# Patient Record
Sex: FEMALE | Race: WHITE | Employment: FULL TIME | URBAN - METROPOLITAN AREA
[De-identification: names, ages, dates, MRNs, and addresses within clinical notes are randomized per-mention and may not be internally consistent; named-entity substitution may affect disease eponyms.]

---

## 2017-03-21 ENCOUNTER — TELEPHONE (OUTPATIENT)
Dept: FAMILY MEDICINE | Facility: CLINIC | Age: 47
End: 2017-03-21

## 2017-03-21 NOTE — LETTER
[facility]  [address], [address]  [place] [address]  Phone: [phone]  Fax: [phone]  March 24, 2017      Kami Bjorgan  [address] [place] [address]      Dear Kami,    We care about your health and have reviewed your health plan including your medical conditions, medications, and lab results.  Based on this review, it is recommended that you follow up regarding the following health topic(s):  -Asthma  -Cervical Cancer Screening    We recommend you take the following action(s):  -schedule a PAP SMEAR EXAM which is due.  Please disregard this reminder if you have had this exam elsewhere within the last year.  It would be helpful for us to have a copy of your recent pap smear report to update your records.   -Complete and return the attached ASTHMA CONTROL TEST.  If your total score is 19 or less or you have been to the ER or urgent care for your asthma, then please schedule an asthma followup appointment.     Please call us at the [facility] - [phone] (or use [employer]) to address the above recommendations.     Thank you for trusting [facility] and we appreciate the opportunity to serve you.  We look forward to supporting your healthcare needs in the future.    Healthy Regards,    Your Health Care Team  [facility]   I will STOP taking the medications listed below when I get home from the hospital:  None

## 2017-03-21 NOTE — TELEPHONE ENCOUNTER
Summary:    Patient is due/failing the following:   ACT, PAP and PHQ9    Action needed:   Patient needs office visit for PAP., Patient needs to do ACT. and Patient needs to do PHQ9.    Type of outreach:    Phone, left message for patient to call back.     Questions for provider review:    None                                                                                                                                    Sary Sy       Chart routed to Care Team .        Panel Management Review      Patient has the following on her problem list:     Depression / Dysthymia review  PHQ-9 SCORE 10/15/2015 4/29/2016 10/20/2016   Total Score - - -   Total Score 0 0 0      Patient is due for:  PHQ9    Asthma review     ACT Total Scores 10/20/2016   ACT TOTAL SCORE -   ASTHMA ER VISITS -   ASTHMA HOSPITALIZATIONS -   ACT TOTAL SCORE (Goal Greater than or Equal to 20) 20   In the past 12 months, how many times did you visit the emergency room for your asthma without being admitted to the hospital? 0   In the past 12 months, how many times were you hospitalized overnight because of your asthma? 0      1. Is Asthma diagnosis on the Problem List? Yes    2. Is Asthma listed on Health Maintenance? Yes    3. Patient is due for:  ACT      Composite cancer screening  Chart review shows that this patient is due/due soon for the following Pap Smear

## 2017-04-14 ENCOUNTER — OFFICE VISIT (OUTPATIENT)
Dept: FAMILY MEDICINE | Facility: CLINIC | Age: 47
End: 2017-04-14
Payer: COMMERCIAL

## 2017-04-14 VITALS
TEMPERATURE: 98.1 F | DIASTOLIC BLOOD PRESSURE: 80 MMHG | SYSTOLIC BLOOD PRESSURE: 110 MMHG | BODY MASS INDEX: 38.2 KG/M2 | WEIGHT: 237.7 LBS | HEART RATE: 60 BPM | HEIGHT: 66 IN | RESPIRATION RATE: 8 BRPM

## 2017-04-14 DIAGNOSIS — Z00.00 ROUTINE GENERAL MEDICAL EXAMINATION AT A HEALTH CARE FACILITY: ICD-10-CM

## 2017-04-14 DIAGNOSIS — Z00.01 ENCOUNTER FOR ROUTINE ADULT MEDICAL EXAM WITH ABNORMAL FINDINGS: ICD-10-CM

## 2017-04-14 DIAGNOSIS — N92.0 MENORRHAGIA WITH REGULAR CYCLE: Primary | ICD-10-CM

## 2017-04-14 LAB
ALT SERPL W P-5'-P-CCNC: 21 U/L (ref 0–50)
ANION GAP SERPL CALCULATED.3IONS-SCNC: 11 MMOL/L (ref 3–14)
AST SERPL W P-5'-P-CCNC: 20 U/L (ref 0–45)
BUN SERPL-MCNC: 11 MG/DL (ref 7–30)
CALCIUM SERPL-MCNC: 9.1 MG/DL (ref 8.5–10.1)
CHLORIDE SERPL-SCNC: 103 MMOL/L (ref 94–109)
CHOLEST SERPL-MCNC: 169 MG/DL
CO2 SERPL-SCNC: 27 MMOL/L (ref 20–32)
CREAT SERPL-MCNC: 0.94 MG/DL (ref 0.52–1.04)
ERYTHROCYTE [DISTWIDTH] IN BLOOD BY AUTOMATED COUNT: 12.6 % (ref 10–15)
FERRITIN SERPL-MCNC: 15 NG/ML (ref 8–252)
GFR SERPL CREATININE-BSD FRML MDRD: 63 ML/MIN/1.7M2
GLUCOSE SERPL-MCNC: 94 MG/DL (ref 70–99)
HCT VFR BLD AUTO: 36.5 % (ref 35–47)
HDLC SERPL-MCNC: 51 MG/DL
HGB BLD-MCNC: 12.6 G/DL (ref 11.7–15.7)
IRON SATN MFR SERPL: 22 % (ref 15–46)
IRON SERPL-MCNC: 85 UG/DL (ref 35–180)
LDLC SERPL CALC-MCNC: 93 MG/DL
MCH RBC QN AUTO: 30.1 PG (ref 26.5–33)
MCHC RBC AUTO-ENTMCNC: 34.5 G/DL (ref 31.5–36.5)
MCV RBC AUTO: 87 FL (ref 78–100)
NONHDLC SERPL-MCNC: 118 MG/DL
PLATELET # BLD AUTO: 302 10E9/L (ref 150–450)
POTASSIUM SERPL-SCNC: 4.1 MMOL/L (ref 3.4–5.3)
RBC # BLD AUTO: 4.18 10E12/L (ref 3.8–5.2)
SODIUM SERPL-SCNC: 141 MMOL/L (ref 133–144)
TIBC SERPL-MCNC: 395 UG/DL (ref 240–430)
TRIGL SERPL-MCNC: 127 MG/DL
WBC # BLD AUTO: 8.2 10E9/L (ref 4–11)

## 2017-04-14 PROCEDURE — 84450 TRANSFERASE (AST) (SGOT): CPT | Performed by: PHYSICIAN ASSISTANT

## 2017-04-14 PROCEDURE — G0145 SCR C/V CYTO,THINLAYER,RESCR: HCPCS | Performed by: PHYSICIAN ASSISTANT

## 2017-04-14 PROCEDURE — 80061 LIPID PANEL: CPT | Performed by: PHYSICIAN ASSISTANT

## 2017-04-14 PROCEDURE — 99396 PREV VISIT EST AGE 40-64: CPT | Performed by: PHYSICIAN ASSISTANT

## 2017-04-14 PROCEDURE — 80048 BASIC METABOLIC PNL TOTAL CA: CPT | Performed by: PHYSICIAN ASSISTANT

## 2017-04-14 PROCEDURE — 84460 ALANINE AMINO (ALT) (SGPT): CPT | Performed by: PHYSICIAN ASSISTANT

## 2017-04-14 PROCEDURE — 36415 COLL VENOUS BLD VENIPUNCTURE: CPT | Performed by: PHYSICIAN ASSISTANT

## 2017-04-14 PROCEDURE — 83540 ASSAY OF IRON: CPT | Performed by: PHYSICIAN ASSISTANT

## 2017-04-14 PROCEDURE — 83550 IRON BINDING TEST: CPT | Performed by: PHYSICIAN ASSISTANT

## 2017-04-14 PROCEDURE — 87624 HPV HI-RISK TYP POOLED RSLT: CPT | Performed by: PHYSICIAN ASSISTANT

## 2017-04-14 PROCEDURE — 82728 ASSAY OF FERRITIN: CPT | Performed by: PHYSICIAN ASSISTANT

## 2017-04-14 PROCEDURE — 85027 COMPLETE CBC AUTOMATED: CPT | Performed by: PHYSICIAN ASSISTANT

## 2017-04-14 ASSESSMENT — PAIN SCALES - GENERAL: PAINLEVEL: NO PAIN (0)

## 2017-04-14 NOTE — LETTER
"[facility]  [address]  [place] [id number] [phone]             April 18, 2017    Kami Natarajan  [address]  [place] [address]              Dear Kami,    The results of your recent tests were normal.  Enclosed is a copy of the results.  It was a pleasure to see you at your last appointment.    The cholesterol looks great! Keep up good efforts at healthy eating choices. The blood sugar was normal. Kidney and liver tests were normal. Even with the heavy periods, the hemoglobin (measures red blood cells and blood loss) was normal and the iron was normal. Plan to see the gynecologist like we discussed.     Interpreting your Cholesterol Profile Results: The LDL is the \"bad\" cholesterol that can clog the arteries.  The HDL is protective or \"good cholesterol\"; exercise and weight management can boost this level. Triglycerides are the sugary-fats in the blood. The ratio of HDL to total cholesterol under 5.0 is optimal.        Results for orders placed or performed in visit on 04/14/17   Pap imaged thin layer screen with HPV - recommended age 30 - 65   Result Value Ref Range    PAP NIL     Copath Report         Patient Name: KAMI NATARAJAN  MR#: [id number]  Specimen #: [id number]  Collected: 4/14/2017  Received: 4/17/2017  Reported: 4/18/2017 15:40  Ordering Phy(s): LA NENA BARBOSA    For improved result formatting, select 'View Enhanced Report Format'  under Linked Documents section.    SPECIMEN/STAIN PROCESS:  Pap imaged thin layer prep screening (Surepath, FocalPoint with guided  screening)       Pap-Cyto x 1, HPV ordered x 1    SOURCE: Cervical, endocervical  ----------------------------------------------------------------   Pap imaged thin layer prep screening (Surepath, FocalPoint with guided  screening)  SPECIMEN ADEQUACY:  Satisfactory for evaluation.  -Transformation zone component present.    CYTOLOGIC INTERPRETATION:    Negative for Intraepithelial Lesion or " Malignancy    Electronically signed out by:  GÉNESIS Javier ( ASCP)    Processed and screened at [facility]    CLINICAL HISTORY:    Irregular periods, Previous n ormal pap  Date of Last Pap: 2/14/14,    Papanicolaou Test Limitations:  Cervical cytology is a screening test  with limited sensitivity; regular screening is critical for cancer  prevention; Pap tests are primarily effective for the  diagnosis/prevention of squamous cell carcinoma, not adenocarcinomas or  other cancers.    TESTING LAB LOCATION:  [address]  [phone]    COLLECTION SITE:  Client:  [facility]  Location: [facility] (B)     Basic metabolic panel  (Ca, Cl, CO2, Creat, Gluc, K, Na, BUN)   Result Value Ref Range    Sodium 141 133 - 144 mmol/L    Potassium 4.1 3.4 - 5.3 mmol/L    Chloride 103 94 - 109 mmol/L    Carbon Dioxide 27 20 - 32 mmol/L    Anion Gap 11 3 - 14 mmol/L    Glucose 94 70 - 99 mg/dL    Urea Nitrogen 11 7 - 30 mg/dL    Creatinine 0.94 0.52 - 1.04 mg/dL    GFR Estimate 63 >60 mL/min/1.7m2    GFR Estimate If Black 76 >60 mL/min/1.7m2    Calcium 9.1 8.5 - 10.1 mg/dL   ALT   Result Value Ref Range    ALT 21 0 - 50 U/L   AST   Result Value Ref Range    AST 20 0 - 45 U/L   Lipid panel reflex to direct LDL   Result Value Ref Range    Cholesterol 169 <200 mg/dL    Triglycerides 127 <150 mg/dL    HDL Cholesterol 51 >49 mg/dL    LDL Cholesterol Calculated 93 <100 mg/dL    Non HDL Cholesterol 118 <130 mg/dL   CBC with platelets   Result Value Ref Range    WBC 8.2 4.0 - 11.0 10e9/L    RBC Count 4.18 3.8 - 5.2 10e12/L    Hemoglobin 12.6 11.7 - 15.7 g/dL    Hematocrit 36.5 35.0 - 47.0 %    MCV 87 78 - 100 fl    MCH 30.1 26.5 - 33.0 pg    MCHC 34.5 31.5 - 36.5 g/dL    RDW 12.6 10.0 - 15.0 %    Platelet Count 302 150 - 450 10e9/L   Iron and iron binding capacity   Result Value Ref  Range    Iron 85 35 - 180 ug/dL    Iron Binding Cap 395 240 - 430 ug/dL    Iron Saturation Index 22 15 - 46 %   Ferritin   Result Value Ref Range    Ferritin 15 8 - 252 ng/mL         If you have any questions or concerns, please call myself or my nurse at [phone].      Sincerely,      Frances Reynolds PA-C

## 2017-04-14 NOTE — MR AVS SNAPSHOT
After Visit Summary   4/14/2017    Kami Natarajan    MRN: [id number]           Patient Information     Date Of Birth          1970        Visit Information        Provider Department      4/14/2017 9:40 AM Frances Reynolds PA-C [facility]        Today's Diagnoses     Menorrhagia with regular cycle    -  1    Routine general medical examination at a health care facility        Encounter for routine adult medical exam with abnormal findings          Care Instructions      Preventive Health Recommendations  Female Ages 40 to 49    Yearly exam:     See your health care provider every year in order to  1. Review health changes.   2. Discuss preventive care.    3. Review your medicines if your doctor prescribed any.      Get a Pap test every three years (unless you have an abnormal result and your provider advises testing more often).      If you get Pap tests with HPV test, you only need to test every 5 years, unless you have an abnormal result. You do not need a Pap test if your uterus was removed (hysterectomy) and you have not had cancer.      You should be tested each year for STDs (sexually transmitted diseases), if you're at risk.       Ask your doctor if you should have a mammogram.      Have a colonoscopy (test for colon cancer) if someone in your family has had colon cancer or polyps before age 50.       Have a cholesterol test every 5 years.       Have a diabetes test (fasting glucose) after age 45. If you are at risk for diabetes, you should have this test every 3 years.    Shots: Get a flu shot each year. Get a tetanus shot every 10 years.     Nutrition:     Eat at least 5 servings of fruits and vegetables each day.    Eat whole-grain bread, whole-wheat pasta and brown rice instead of white grains and rice.    Talk to your provider about Calcium and Vitamin D.     Lifestyle    Exercise at least 150 minutes a week (an average of 30 minutes a day, 5 days a week). This will help  you control your weight and prevent disease.    Limit alcohol to one drink per day.    No smoking.     Wear sunscreen to prevent skin cancer.    See your dentist every six months for an exam and cleaning.      We talked about the heavy bleeding (menorrhagia).   discussed possble need for repeat endometrial biopsy, pelvic ultrasound  Checking iron levels today  Discussed mirena IUD and endometrial ablation for treatment  Plan to see - OB/GYN          Follow-ups after your visit        Your next 10 appointments already scheduled     Oct 16, 2017  4:50 PM CDT   LAB with LAB FIRST FLOOR [facility])    [address] [place] [address] [phone]           Patient must bring picture ID.  Patient should be prepared to give a urine specimen  Please do not eat 10-12 hours before your appointment if you are coming in fasting for labs on lipids, cholesterol, or glucose (sugar).  Pregnant women should follow their Care Team instructions. Water with medications is okay. Do not drink coffee or other fluids.   If you have concerns about taking  your medications, please ask at office or if scheduling via [employer], send a message by clicking on Secure Messaging, Message Your Care Team.            Oct 23, 2017  8:00 AM CDT   Return Visit with Anju Jacobs MD   [facility])    [address] [place] [address] [phone]              Who to contact     If you have questions or need follow up information about today's clinic visit or your schedule please contact [facility] directly at [phone].  Normal or non-critical lab and imaging results will be communicated to you by MyChart, letter or phone within 4 business days after the clinic has received the results. If you do not hear from us within 7 days, please contact the clinic through MyChart or phone. If you have a  "critical or abnormal lab result, we will notify you by phone as soon as possible.  Submit refill requests through [employer] or call your pharmacy and they will forward the refill request to us. Please allow 3 business days for your refill to be completed.          Additional Information About Your Visit        [employer] Information     [employer] gives you secure access to your electronic health record. If you see a primary care provider, you can also send messages to your care team and make appointments. If you have questions, please call your primary care clinic.  If you do not have a primary care provider, please call [phone] and they will assist you.        Care EveryWhere ID     This is your Care EveryWhere ID. This could be used by other organizations to access your [facility] medical records  [id number]        Your Vitals Were     Pulse Temperature Respirations Height Last Period BMI (Body Mass Index)    60 98.1  F (36.7  C) (Temporal) 8 5' 6.14\" (1.68 m) 03/26/2017 (Approximate) 38.2 kg/m2       Blood Pressure from Last 3 Encounters:   04/14/17 110/80   11/22/16 116/81   10/20/16 120/70    Weight from Last 3 Encounters:   04/14/17 237 lb 11.2 oz (107.8 kg)   11/22/16 232 lb 6.4 oz (105.4 kg)   10/20/16 235 lb 3.2 oz (106.7 kg)              We Performed the Following     ALT     AST     Asthma Action Plan (AAP)     Basic metabolic panel  (Ca, Cl, CO2, Creat, Gluc, K, Na, BUN)     CBC with platelets     DEPRESSION ACTION PLAN (DAP)     Ferritin     HPV High Risk Types DNA Cervical     Iron and iron binding capacity     Lipid panel reflex to direct LDL     Pap imaged thin layer screen with HPV - recommended age 30 - 65        Primary Care Provider Office Phone # Fax #    Valerio Knox [phone]       [facility] [place] [address]  [place] [address]        Thank you!     [facility]  [facility] care. Our goal is always to provide you with " excellent care. Hearing back from our patients is one way we can continue to improve our services. Please take a few minutes to complete the written survey that you may receive in the mail after your visit with us. Thank you!             Your Updated Medication List - Protect others around you: Learn how to safely use, store and throw away your medicines at www.disposemymeds.org.          This list is accurate as of: 4/14/17 10:33 AM.  Always use your most recent med list.                   Brand Name Dispense Instructions for use    albuterol 108 (90 BASE) MCG/ACT Inhaler    PROAIR HFA/PROVENTIL HFA/VENTOLIN HFA    1 Inhaler    Inhale 2 puffs into the lungs every 4 hours as needed for shortness of breath / dyspnea       calcitRIOL 0.25 MCG capsule    ROCALTROL    180 capsule    Take one capsule by mouth twice daily       calcium 600 MG tablet      Take 1 tablet by mouth 2 times daily       CLARITIN 10 MG tablet   Generic drug:  loratadine      Take 10 mg by mouth daily       FLONASE NA          fluticasone-salmeterol 250-50 MCG/DOSE diskus inhaler    ADVAIR DISKUS    3 Inhaler    INHALE 1 PUFF BY INHALATION ROUTE EVERY 12 HOURS       levothyroxine 200 MCG tablet    SYNTHROID/LEVOTHROID    90 tablet    Take 1 tablet (200 mcg) by mouth daily       montelukast 10 MG tablet    SINGULAIR    90 tablet    TAKE ONE TABLET BY MOUTH EVERY NIGHT AT BEDTIME FOR ALLERGY AND ASTHMA CONTROL       order for DME     1 each    [id number] Plantar fasciits,ngt Splt,lg

## 2017-04-14 NOTE — NURSING NOTE
"Chief Complaint   Patient presents with     Physical     Mentrual flow has changed a lot. First 2 days are super heavy. And it is not every month.      Panel Management     Pap, DAP, PHQ-9/RUDI, AAP, ACT       Initial /80  Pulse 60  Temp 98.1  F (36.7  C) (Temporal)  Resp 8  Ht 5' 6.14\" (1.68 m)  Wt 237 lb 11.2 oz (107.8 kg)  LMP 03/26/2017 (Approximate)  BMI 38.2 kg/m2 Estimated body mass index is 38.2 kg/(m^2) as calculated from the following:    Height as of this encounter: 5' 6.14\" (1.68 m).    Weight as of this encounter: 237 lb 11.2 oz (107.8 kg).  Medication Reconciliation: complete     Constance Alford CMA  "

## 2017-04-14 NOTE — LETTER
My Depression Action Plan  Name: Kami Natarajan   Date of Birth 1970  Date: 4/11/2017    My doctor: Valerio Knox   My clinic: [facility]  [address] [place] [address], [address]  [place] [address] [phone]          GREEN    ZONE   Good Control    What it looks like:     Things are going generally well. You have normal up s and down s. You may even feel depressed from time to time, but bad moods usually last less than a day.   What you need to do:  1. Continue to care for yourself (see self care plan)  2. Check your depression survival kit and update it as needed  3. Follow your physician s recommendations including any medication.  4. Do not stop taking medication unless you consult with your physician first.           YELLOW         ZONE Getting Worse    What it looks like:     Depression is starting to interfere with your life.     It may be hard to get out of bed; you may be starting to isolate yourself from others.    Symptoms of depression are starting to last most all day and this has happened for several days.     You may have suicidal thoughts but they are not constant.   What you need to do:     1. Call your care team, your response to treatment will improve if you keep your care team informed of your progress. Yellow periods are signs an adjustment may need to be made.     2. Continue your self-care, even if you have to fake it!    3. Talk to someone in your support network    4. Open up your depression survival kit           RED    ZONE Medical Alert - Get Help    What it looks like:     Depression is seriously interfering with your life.     You may experience these or other symptoms: You can t get out of bed most days, can t work or engage in other necessary activities, you have trouble taking care of basic hygiene, or basic responsibilities, thoughts of suicide or death that will not go away, self-injurious behavior.     What you need to do:  1. Call your care  team and request a same-day appointment. If they are not available (weekends or after hours) call your local crisis line, emergency room or 911.      Electronically signed by: Palak Garg, April 11, 2017    Depression Self Care Plan / Survival Kit    Self-Care for Depression  Here s the deal. Your body and mind are really not as separate as most people think.  What you do and think affects how you feel and how you feel influences what you do and think. This means if you do things that people who feel good do, it will help you feel better.  Sometimes this is all it takes.  There is also a place for medication and therapy depending on how severe your depression is, so be sure to consult with your medical provider and/ or Behavioral Health Consultant if your symptoms are worsening or not improving.     In order to better manage my stress, I will:    Exercise  Get some form of exercise, every day. This will help reduce pain and release endorphins, the  feel good  chemicals in your brain. This is almost as good as taking antidepressants!  This is not the same as joining a gym and then never going! (they count on that by the way ) It can be as simple as just going for a walk or doing some gardening, anything that will get you moving.      Hygiene   Maintain good hygiene (Get out of bed in the morning, Make your bed, Brush your teeth, Take a shower, and Get dressed like you were going to work, even if you are unemployed).  If your clothes don't fit try to get ones that do.    Diet  I will strive to eat foods that are good for me, drink plenty of water, and avoid excessive sugar, caffeine, alcohol, and other mood-altering substances.  Some foods that are helpful in depression are: complex carbohydrates, B vitamins, flaxseed, fish or fish oil, fresh fruits and vegetables.    Psychotherapy  I agree to participate in Individual Therapy (if recommended).    Medication  If prescribed medications, I agree to take them.  Missing  doses can result in serious side effects.  I understand that drinking alcohol, or other illicit drug use, may cause potential side effects.  I will not stop my medication abruptly without first discussing it with my provider.    Staying Connected With Others  I will stay in touch with my friends, family members, and my primary care provider/team.    Use your imagination  Be creative.  We all have a creative side; it doesn t matter if it s oil painting, sand castles, or mud pies! This will also kick up the endorphins.    Witness Beauty  (AKA stop and smell the roses) Take a look outside, even in mid-winter. Notice colors, textures. Watch the squirrels and birds.     Service to others  Be of service to others.  There is always someone else in need.  By helping others we can  get out of ourselves  and remember the really important things.  This also provides opportunities for practicing all the other parts of the program.    Humor  Laugh and be silly!  Adjust your TV habits for less news and crime-drama and more comedy.    Control your stress  Try breathing deep, massage therapy, biofeedback, and meditation. Find time to relax each day.     My support system    Clinic Contact:  Phone number:    Contact 1:  Phone number:    Contact 2:  Phone number:    Yazdanism/:  Phone number:    Therapist:  Phone number:    Local crisis center:    Phone number:    Other community support:  Phone number:

## 2017-04-15 ASSESSMENT — ASTHMA QUESTIONNAIRES: ACT_TOTALSCORE: 23

## 2017-04-18 LAB
COPATH REPORT: NORMAL
PAP: NORMAL

## 2017-04-20 ENCOUNTER — TELEPHONE (OUTPATIENT)
Dept: FAMILY MEDICINE | Facility: CLINIC | Age: 47
End: 2017-04-20

## 2017-04-20 LAB
FINAL DIAGNOSIS: NORMAL
HPV HR 12 DNA CVX QL NAA+PROBE: NEGATIVE
HPV16 DNA SPEC QL NAA+PROBE: NEGATIVE
HPV18 DNA SPEC QL NAA+PROBE: NEGATIVE
SPECIMEN DESCRIPTION: NORMAL

## 2017-04-20 NOTE — TELEPHONE ENCOUNTER
Notes Recorded by Arash Neely on 4/19/2017 at 9:14 AM  Left a message for pt to return call.   Letter printed and mailed to pt.

## 2017-04-20 NOTE — TELEPHONE ENCOUNTER
LM for to return call, when call is returned give information below.    Leandra Madrigal CMA ([facility])

## 2017-04-20 NOTE — TELEPHONE ENCOUNTER
----- Message from Frances Reynolds PA-C sent at 4/18/2017  9:33 PM CDT -----  Please contact pt- The cholesterol looks great! Keep up good efforts at healthy eating choices. The blood sugar was normal. Kidney and liver tests were normal. Even with the heavy periods, the hemoglobin (measures red blood cells and blood loss) was normal and the iron was normal. Plan to see the gynecologist like we discussed.  ( Please also mail letter in Letters tab.)  If pt has questions please contact the clinic. Take care, Frances Reynolds PA-C

## 2017-05-05 ENCOUNTER — TELEPHONE (OUTPATIENT)
Dept: OBGYN | Facility: CLINIC | Age: 47
End: 2017-05-05

## 2017-05-05 ENCOUNTER — OFFICE VISIT (OUTPATIENT)
Dept: OBGYN | Facility: CLINIC | Age: 47
End: 2017-05-05
Payer: COMMERCIAL

## 2017-05-05 VITALS
BODY MASS INDEX: 41.14 KG/M2 | HEART RATE: 72 BPM | DIASTOLIC BLOOD PRESSURE: 78 MMHG | WEIGHT: 256 LBS | SYSTOLIC BLOOD PRESSURE: 114 MMHG

## 2017-05-05 DIAGNOSIS — N92.0 EXCESSIVE OR FREQUENT MENSTRUATION: Primary | ICD-10-CM

## 2017-05-05 PROCEDURE — 99213 OFFICE O/P EST LOW 20 MIN: CPT | Performed by: OBSTETRICS & GYNECOLOGY

## 2017-05-05 ASSESSMENT — PAIN SCALES - GENERAL: PAINLEVEL: NO PAIN (0)

## 2017-05-05 NOTE — TELEPHONE ENCOUNTER
I returned patients call and scheduled her for the endometrial biopsy.  Angle Fox, [facility]  May 5, 2017 9:07 AM

## 2017-05-05 NOTE — TELEPHONE ENCOUNTER
[facility] Call Center    Phone Message    Name of Caller: Kami    Phone Number: Home number on file [phone] (home)    Best time to return call: any    May a detailed message be left on voicemail: yes    Relation to patient: Self    Reason for Call: Other: Patient is calling to schedule a biopsy with Dr. Lane. Per clinic sending message. Please advise.     Action Taken: Message routed to:  [facility] p [phone]

## 2017-05-05 NOTE — PROGRESS NOTES
Subjective  47 year old non-pregnant female presents today complaining of menometrorrhagia.  Patient states sometimes she will have two menses a month and other times she will not have any.  She bleeds very heavy the first two days. She soaks a super heavy tampon in an hour and still has messes she says.  She uses pads and has to change them frequently but occasionally the blood misses because the pad gets all twisted.  Some cramping with menses.  Her mother had a hysterectomy at age 40 for endometriosis.  Patient has had 3 NSVDs.  Her last menstrual period was 4/12.  Patient denies any problems urinating.  Normal bowel movements.  Patient has a  but hasn't had sex in many years she says.  Pap smear was negative in 2017.  Hgb was 12.6.  Patient is a teacher.  We discussed medical and surgical management of her menometrorrhagia.  Patient is leaning towards a Mirena.  I recommended a pelvic ultrasound and endometrial biopsy as the last one she did was in 2014.  I will do her pelvic exam when I do her endometrial biopsy.        ROS: 10 point ROS neg other than the symptoms noted above in the HPI.  Past Medical History:   Diagnosis Date     Alpha-1-antitrypsin deficiency (H)      Asthma     not hospitalized for this     Mild persistent asthma      Past Surgical History:   Procedure Laterality Date     HERNIA REPAIR, INGUINAL RT/LT  infant     THYROIDECTOMY  8/8/2012    Procedure: THYROIDECTOMY;  Total Thyroidectomy and Central Neck Dissection;  Surgeon: Philomena Ruiz MD;  Location:  OR     Family History   Problem Relation Age of Onset     Asthma Father      CANCER Father      cirrhosis and liver cancer     Genetic Disorder Father      alpha 1 antitrypsin deficiency     Thyroid Disease Mother      also on blood thinner. unclear history     Social History   Substance Use Topics     Smoking status: Former Smoker     Smokeless tobacco: Never Used     Alcohol use Yes      Comment: occasionally          Objective  Vitals: /78  Pulse 72  Wt 116.1 kg (256 lb)  LMP 04/14/2017  BMI 41.14 kg/m2  BMI= Body mass index is 41.14 kg/(m^2).        Assessment  1.)  Menometorrhagia      Plan  1.)  Schedule pelvic ultrasound and endometrial biopsy      25 minutes was spent face to face with the patient today discussing her history, diagnosis, and follow-up plan as noted above.  Over 50% of the visit was spent in counseling and coordination of care.    Total Visit Time: 25 minutes including counseling and physical exam not including time spent on the procedure.    Nursing notes read and reviewed    Sierra Lane

## 2017-05-05 NOTE — MR AVS SNAPSHOT
"              After Visit Summary   5/5/2017    Kami Natarajan    MRN: [id number]           Patient Information     Date Of Birth          1970        Visit Information        Provider Department      5/5/2017 8:00 AM Sierra Lane DO [facility]        Today's Diagnoses     Excessive or frequent menstruation    -  1      Care Instructions    HEAVY MENSTRUAL BLEEDING    In this condition (also called \"menorrhagia\"), the menstrual periods are heavier or longer than usual. You may pass large, dark clots. If you have frequent, heavy periods, you may become anemic (low blood count). Severe anemia may cause you to look pale and feel weak or fatigued. You might become short of breath with minimal exertion.  Heavy or prolonged bleeding may be due to female hormones being out of balance. Other causes include pelvic infection, benign fibroid tumors, use of an IUD or low thyroid function. It may occur in girls soon after they start to have their periods. Older women close to the age of menopause may also have this type of bleeding. If heavy bleeding does not stop, further evaluation will be needed to find out the exact cause.  HOME CARE:  1. If you tire easily, get plenty of rest. Avoid heavy exertion.  2. Do not take aspirin-containing products and anti-inflammatory medicines like ibuprofen (Advil, Motrin), which thin the blood and may cause more bleeding. You may take acetaminophen (Tylenol) for pain unless another pain medicine was prescribed.  3. If iron was prescribed for anemia, it will take about 4-6 weeks to rebuild your blood and correct the anemia. Take the iron as directed.  4. If hormones were prescribed to control your bleeding, take them just as instructed. If you stop too soon or miss doses, the bleeding may begin again. If you were prescribed a medicine called Provera (medroxyprogesterone), the bleeding should stop while you are taking it. Another period will start a few days after you " finish the medicine.  FOLLOW UP: You should see your doctor within the next 1-2 days if your bleeding does not begin to improve. Otherwise, schedule an appointment within the next 1-2 weeks.  GET PROMPT MEDICAL ATTENTION if any of the following occur:    Heavier bleeding (soaking one pad an hour for three hours)    Heavy bleeding for more than one week    Fever of 100.4 F (38 C) or higher, or as directed by your healthcare provider    Increase in abdominal pain    Feeling weak or dizzy, fainting        Follow-ups after your visit        Follow-up notes from your care team     Return in about 4 weeks (around 6/2/2017).      Your next 10 appointments already scheduled     May 08, 2017  5:00 PM CDT   US PELVIC COMPLETE W TRANSVAGINAL with MGUS1, MG US FetchDog   [facility] ([facility])    [id number] [place] [id number] [phone]           Please bring a list of your medicines (including vitamins, minerals and over-the-counter drugs). Also, tell your doctor about any allergies you may have. Wear comfortable clothes and leave your valuables at home.  Adults: Drink six 8-ounce glasses of fluid one hour before your exam. Do NOT empty your bladder.  If you need to empty your bladder before your exam, try to release only a little bit of urine. Then, drink another 8oz glass of fluid.  Children: Children who are potty trained should drink at least 4 cups (32 oz) of liquid 45 minutes to one hour prior to the exam. The child s bladder must be full in order to achieve a diagnostic exam. If your child is very uncomfortable or has an urgent need to pee, please notify a technologist; they will try to find out how much longer the wait may be and provide instructions to help relieve the pressure. Occasionally it is medically necessary to insert a urinary catheter to fill the bladder.  Please call the Imaging Department at your exam site with any questions.            Oct 16, 2017   4:50 PM CDT   LAB with LAB FIRST FLOOR [facility] ([facility])    [address] [place] [id number] [phone]           Patient must bring picture ID.  Patient should be prepared to give a urine specimen  Please do not eat 10-12 hours before your appointment if you are coming in fasting for labs on lipids, cholesterol, or glucose (sugar).  Pregnant women should follow their Care Team instructions. Water with medications is okay. Do not drink coffee or other fluids.   If you have concerns about taking  your medications, please ask at office or if scheduling via [employer], send a message by clicking on Secure Messaging, Message Your Care Team.            Oct 23, 2017  8:00 AM CDT   Return Visit with Anju Jacobs MD   [facility] ([facility])    [address] [place] [id number] [phone]              Future tests that were ordered for you today     Open Future Orders        Priority Expected Expires Ordered    US Pelvic Complete with Transvaginal Routine  8/3/2017 5/5/2017            Who to contact     If you have questions or need follow up information about today's clinic visit or your schedule please contact [facility] directly at [phone].  Normal or non-critical lab and imaging results will be communicated to you by MyChart, letter or phone within 4 business days after the clinic has received the results. If you do not hear from us within 7 days, please contact the clinic through MyChart or phone. If you have a critical or abnormal lab result, we will notify you by phone as soon as possible.  Submit refill requests through [employer] or call your pharmacy and they will forward the refill request to us. Please allow 3 business days for your refill to be completed.          Additional Information About Your Visit        [employer] Information     [employer] gives you secure access to your  electronic health record. If you see a primary care provider, you can also send messages to your care team and make appointments. If you have questions, please call your primary care clinic.  If you do not have a primary care provider, please call [phone] and they will assist you.        Care EveryWhere ID     This is your Care EveryWhere ID. This could be used by other organizations to access your [facility] medical records  [id number]        Your Vitals Were     Pulse Last Period BMI (Body Mass Index)             72 04/14/2017 41.14 kg/m2          Blood Pressure from Last 3 Encounters:   05/05/17 114/78   04/14/17 110/80   11/22/16 116/81    Weight from Last 3 Encounters:   05/05/17 116.1 kg (256 lb)   04/14/17 107.8 kg (237 lb 11.2 oz)   11/22/16 105.4 kg (232 lb 6.4 oz)               Primary Care Provider Office Phone # Fax #    Valerio Knox [phone]       [facility] [address] [facility] [address]        Thank you!     Thank you for choosing [facility]  for your care. Our goal is always to provide you with excellent care. Hearing back from our patients is one way we can continue to improve our services. Please take a few minutes to complete the written survey that you may receive in the mail after your visit with us. Thank you!             Your Updated Medication List - Protect others around you: Learn how to safely use, store and throw away your medicines at www.disposemymeds.org.          This list is accurate as of: 5/5/17  8:56 AM.  Always use your most recent med list.                   Brand Name Dispense Instructions for use    albuterol 108 (90 BASE) MCG/ACT Inhaler    PROAIR HFA/PROVENTIL HFA/VENTOLIN HFA    1 Inhaler    Inhale 2 puffs into the lungs every 4 hours as needed for shortness of breath / dyspnea       calcitRIOL 0.25 MCG capsule    ROCALTROL    180 capsule    Take one capsule by mouth twice daily       calcium 600 MG tablet       Take 1 tablet by mouth 2 times daily       CLARITIN 10 MG tablet   Generic drug:  loratadine      Take 10 mg by mouth daily       FLONASE NA          fluticasone-salmeterol 250-50 MCG/DOSE diskus inhaler    ADVAIR DISKUS    3 Inhaler    INHALE 1 PUFF BY INHALATION ROUTE EVERY 12 HOURS       levothyroxine 200 MCG tablet    SYNTHROID/LEVOTHROID    90 tablet    Take 1 tablet (200 mcg) by mouth daily       montelukast 10 MG tablet    SINGULAIR    90 tablet    TAKE ONE TABLET BY MOUTH EVERY NIGHT AT BEDTIME FOR ALLERGY AND ASTHMA CONTROL       order for DME     1 each    [id number] Plantar fasciits,ngt Splt,lg

## 2017-05-05 NOTE — PATIENT INSTRUCTIONS
"HEAVY MENSTRUAL BLEEDING    In this condition (also called \"menorrhagia\"), the menstrual periods are heavier or longer than usual. You may pass large, dark clots. If you have frequent, heavy periods, you may become anemic (low blood count). Severe anemia may cause you to look pale and feel weak or fatigued. You might become short of breath with minimal exertion.  Heavy or prolonged bleeding may be due to female hormones being out of balance. Other causes include pelvic infection, benign fibroid tumors, use of an IUD or low thyroid function. It may occur in girls soon after they start to have their periods. Older women close to the age of menopause may also have this type of bleeding. If heavy bleeding does not stop, further evaluation will be needed to find out the exact cause.  HOME CARE:  1. If you tire easily, get plenty of rest. Avoid heavy exertion.  2. Do not take aspirin-containing products and anti-inflammatory medicines like ibuprofen (Advil, Motrin), which thin the blood and may cause more bleeding. You may take acetaminophen (Tylenol) for pain unless another pain medicine was prescribed.  3. If iron was prescribed for anemia, it will take about 4-6 weeks to rebuild your blood and correct the anemia. Take the iron as directed.  4. If hormones were prescribed to control your bleeding, take them just as instructed. If you stop too soon or miss doses, the bleeding may begin again. If you were prescribed a medicine called Provera (medroxyprogesterone), the bleeding should stop while you are taking it. Another period will start a few days after you finish the medicine.  FOLLOW UP: You should see your doctor within the next 1-2 days if your bleeding does not begin to improve. Otherwise, schedule an appointment within the next 1-2 weeks.  GET PROMPT MEDICAL ATTENTION if any of the following occur:    Heavier bleeding (soaking one pad an hour for three hours)    Heavy bleeding for more than one week    Fever of " 100.4 F (38 C) or higher, or as directed by your healthcare provider    Increase in abdominal pain    Feeling weak or dizzy, fainting

## 2017-05-05 NOTE — NURSING NOTE
"Chief Complaint   Patient presents with     Consult     Menorrhagia---  poss. EB  ( has not had an US)       Initial /78  Pulse 72  Wt 256 lb (116.1 kg)  LMP 04/14/2017  BMI 41.14 kg/m2 Estimated body mass index is 41.14 kg/(m^2) as calculated from the following:    Height as of 4/14/17: 5' 6.14\" (1.68 m).    Weight as of this encounter: 256 lb (116.1 kg).  Medication Reconciliation: complete  "

## 2017-05-08 ENCOUNTER — RADIANT APPOINTMENT (OUTPATIENT)
Dept: ULTRASOUND IMAGING | Facility: CLINIC | Age: 47
End: 2017-05-08
Attending: OBSTETRICS & GYNECOLOGY
Payer: COMMERCIAL

## 2017-05-08 DIAGNOSIS — N92.0 EXCESSIVE OR FREQUENT MENSTRUATION: ICD-10-CM

## 2017-05-08 PROCEDURE — 76856 US EXAM PELVIC COMPLETE: CPT | Performed by: RADIOLOGY

## 2017-05-08 PROCEDURE — 76830 TRANSVAGINAL US NON-OB: CPT | Performed by: RADIOLOGY

## 2017-05-15 ENCOUNTER — TELEPHONE (OUTPATIENT)
Dept: FAMILY MEDICINE | Facility: CLINIC | Age: 47
End: 2017-05-15

## 2017-05-15 NOTE — TELEPHONE ENCOUNTER
Reason for call:  Results  Reason for Call:  Request for results:    Name of test or procedure: ultrasound      Date of test of procedure: 5-8th    Location of the test or procedure: [facility]    OK to leave the result message on voice mail or with a family member? YES    Phone number Patient can be reached at:  Work number on file:  [phone]    Additional comments: none    Call taken on 5/15/2017 at 11:01 AM by Francis Esteban

## 2017-05-17 NOTE — TELEPHONE ENCOUNTER
Patient calling back.  She would like to speak with Dr Lane regarding her ultra sound results.  Her cell number is [phone].  Thank you

## 2017-05-18 NOTE — TELEPHONE ENCOUNTER
Writer called pt and verified that pt had not received results.   Pt informed of results below.     Notes Recorded by Sierra Lane DO on 5/16/2017 at 9:19 PM  Please call patient with her normal ultrasound results.  Let her know she still has a small fibroid in her uterus.  We can discuss it more at her upcoming appointment. Thanks!    ~DO Sulema Coello MA  May 18, 2017

## 2017-05-23 ENCOUNTER — OFFICE VISIT (OUTPATIENT)
Dept: OBGYN | Facility: CLINIC | Age: 47
End: 2017-05-23
Payer: COMMERCIAL

## 2017-05-23 VITALS
BODY MASS INDEX: 38.09 KG/M2 | DIASTOLIC BLOOD PRESSURE: 71 MMHG | WEIGHT: 237 LBS | SYSTOLIC BLOOD PRESSURE: 106 MMHG | HEART RATE: 82 BPM

## 2017-05-23 DIAGNOSIS — D25.0 SUBMUCOUS LEIOMYOMA OF UTERUS: ICD-10-CM

## 2017-05-23 DIAGNOSIS — N92.0 EXCESSIVE OR FREQUENT MENSTRUATION: Primary | ICD-10-CM

## 2017-05-23 DIAGNOSIS — N88.2 CERVICAL OS STENOSIS: ICD-10-CM

## 2017-05-23 PROCEDURE — 99214 OFFICE O/P EST MOD 30 MIN: CPT | Mod: 52 | Performed by: OBSTETRICS & GYNECOLOGY

## 2017-05-23 ASSESSMENT — PAIN SCALES - GENERAL: PAINLEVEL: NO PAIN (0)

## 2017-05-23 NOTE — MR AVS SNAPSHOT
After Visit Summary   5/23/2017    Kami Natarajan    MRN: [id number]           Patient Information     Date Of Birth          1970        Visit Information        Provider Department      5/23/2017 10:30 AM Sierra Lane DO [facility]        Today's Diagnoses     Excessive or frequent menstruation    -  1    Submucous leiomyoma of uterus        Cervical os stenosis          Care Instructions    Please call if you any questions.    [address]   [address]  [phone]        Sierra Lane        Follow-ups after your visit        Follow-up notes from your care team     Return in about 4 weeks (around 6/20/2017).      Your next 10 appointments already scheduled     Oct 16, 2017  4:50 PM CDT   LAB with LAB FIRST FLOOR [facility])    [address] [place] [id number] [phone]           Patient must bring picture ID.  Patient should be prepared to give a urine specimen  Please do not eat 10-12 hours before your appointment if you are coming in fasting for labs on lipids, cholesterol, or glucose (sugar).  Pregnant women should follow their Care Team instructions. Water with medications is okay. Do not drink coffee or other fluids.   If you have concerns about taking  your medications, please ask at office or if scheduling via [employer], send a message by clicking on Secure Messaging, Message Your Care Team.            Oct 23, 2017  8:00 AM CDT   Return Visit with Anju Jacobs MD   [facility])    [address] [place] [id number]   [phone]              Future tests that were ordered for you today     Open Future Orders        Priority Expected Expires Ordered    Beta HCG qual IFA urine Routine 5/16/2017 5/16/2018 5/16/2017            Who to contact     If you have questions or need follow  up information about today's clinic visit or your schedule please contact [facility] directly at [phone].  Normal or non-critical lab and imaging results will be communicated to you by MyChart, letter or phone within 4 business days after the clinic has received the results. If you do not hear from us within 7 days, please contact the clinic through [employer] or phone. If you have a critical or abnormal lab result, we will notify you by phone as soon as possible.  Submit refill requests through [employer] or call your pharmacy and they will forward the refill request to us. Please allow 3 business days for your refill to be completed.          Additional Information About Your Visit        [employer] Information     [employer] gives you secure access to your electronic health record. If you see a primary care provider, you can also send messages to your care team and make appointments. If you have questions, please call your primary care clinic.  If you do not have a primary care provider, please call [phone] and they will assist you.        Care EveryWhere ID     This is your Care EveryWhere ID. This could be used by other organizations to access your [place] medical records  [id number]        Your Vitals Were     Pulse Last Period BMI (Body Mass Index)             82 04/14/2017 38.09 kg/m2          Blood Pressure from Last 3 Encounters:   05/23/17 106/71   05/05/17 114/78   04/14/17 110/80    Weight from Last 3 Encounters:   05/23/17 107.5 kg (237 lb)   05/05/17 116.1 kg (256 lb)   04/14/17 107.8 kg (237 lb 11.2 oz)               Primary Care Provider Office Phone # Fax #    Valerio Knox [phone]       [facility] [place] [address] [facility]  [place] [address]        Thank you!     Thank you for choosing [facility]  for your care. Our goal is always to provide you with excellent care. Hearing back from our patients is one way we can continue to  improve our services. Please take a few minutes to complete the written survey that you may receive in the mail after your visit with us. Thank you!             Your Updated Medication List - Protect others around you: Learn how to safely use, store and throw away your medicines at www.disposemymeds.org.          This list is accurate as of: 5/23/17 11:17 AM.  Always use your most recent med list.                   Brand Name Dispense Instructions for use    albuterol 108 (90 BASE) MCG/ACT Inhaler    PROAIR HFA/PROVENTIL HFA/VENTOLIN HFA    1 Inhaler    Inhale 2 puffs into the lungs every 4 hours as needed for shortness of breath / dyspnea       calcitRIOL 0.25 MCG capsule    ROCALTROL    180 capsule    Take one capsule by mouth twice daily       calcium 600 MG tablet      Take 1 tablet by mouth 2 times daily       CLARITIN 10 MG tablet   Generic drug:  loratadine      Take 10 mg by mouth daily       FLONASE NA          fluticasone-salmeterol 250-50 MCG/DOSE diskus inhaler    ADVAIR DISKUS    3 Inhaler    INHALE 1 PUFF BY INHALATION ROUTE EVERY 12 HOURS       levothyroxine 200 MCG tablet    SYNTHROID/LEVOTHROID    90 tablet    Take 1 tablet (200 mcg) by mouth daily       montelukast 10 MG tablet    SINGULAIR    90 tablet    TAKE ONE TABLET BY MOUTH EVERY NIGHT AT BEDTIME FOR ALLERGY AND ASTHMA CONTROL       order for DME     1 each    [id number] Plantar fasciits,ngt Splt,lg

## 2017-05-23 NOTE — PATIENT INSTRUCTIONS
Please call if you any questions.    [facility]  [address] [place]   [address]  [phone]        Sierra Lane

## 2017-05-23 NOTE — NURSING NOTE
"Chief Complaint   Patient presents with     Procedure     Endometrial Biopsy //  consent       Initial Wt 116.1 kg (256 lb)  LMP 04/14/2017  BMI 41.14 kg/m2 Estimated body mass index is 41.14 kg/(m^2) as calculated from the following:    Height as of 4/14/17: 1.68 m (5' 6.14\").    Weight as of this encounter: 116.1 kg (256 lb).  Medication Reconciliation: complete  "

## 2017-05-23 NOTE — PROGRESS NOTES
Subjective  47 year old non-pregnant female presents today for an endometrial biopsy due to menometrorrhagia. Patient states sometimes she will have two menses a month and other times she will not have any. She bleeds very heavy the first two days. She soaks a super heavy tampon in an hour and still has messes she says. She uses pads and has to change them frequently but occasionally the blood misses because the pad gets all twisted. Some cramping with menses. Her mother had a hysterectomy at age 40 for endometriosis. Patient has had 3 NSVDs. Her last menstrual period was 4/12. Patient denies any problems urinating. Normal bowel movements. Patient has a  but hasn't had sex in many years she says. Pap smear was negative in 2017. Hgb was 12.6. Patient is a teacher. A recent pelvic ultrasound showed an increase in the uterine fibroid from 1.8 to 2.8cm.  We discussed HD&C and patient is in agreement.  She would like the Mirena inserted then.  I discussed risks, benefits, and complications of surgery including but not limited to bleeding, infection, damage to nearby organs including but not limited to bladder, bowel, ureters, nerves, and blood vessels as well as anesthesia risks.  Injury may result at the time of surgery or in a separate procedure.  We also discussed the possibility of a reoperation if the pathology came back abnormal.  All questions answered, and accepting these risks, the patient elects to proceed with the procedure.          ROS: 10 point ROS neg other than the symptoms noted above in the HPI.  Past Medical History:   Diagnosis Date     Alpha-1-antitrypsin deficiency (H)      Asthma     not hospitalized for this     Mild persistent asthma      Past Surgical History:   Procedure Laterality Date     HERNIA REPAIR, INGUINAL RT/LT  infant     THYROIDECTOMY  8/8/2012    Procedure: THYROIDECTOMY;  Total Thyroidectomy and Central Neck Dissection;  Surgeon: Philomena Ruiz MD;  Location:  OR      Family History   Problem Relation Age of Onset     Asthma Father      CANCER Father      cirrhosis and liver cancer     Genetic Disorder Father      alpha 1 antitrypsin deficiency     Thyroid Disease Mother      also on blood thinner. unclear history     Social History   Substance Use Topics     Smoking status: Former Smoker     Smokeless tobacco: Never Used     Alcohol use Yes      Comment: occasionally         Objective  Vitals: /71 (BP Location: Right arm)  Pulse 82  Wt 107.5 kg (237 lb)  LMP 04/14/2017  BMI 38.09 kg/m2  BMI= Body mass index is 38.09 kg/(m^2).    General appearance=mood is stable, no deformities noted  PELVIC:    External genitalia: normal without lesions or masses  Urethral meatus: no lesions or prolapse noted, normal size  Urethra: no masses, non tender  Bladder: non tender, no fullness  Vagina: normal mucosa and rugae, no discharge.  Cervix: normal without lesion, no cervical motion tenderness, multiparous, friable  Uterus: small, mobile, nontender.  Adnexa: non tender, without masses  Rectal: deffered  Ext=no clubbing or cyanosis, no swelling    Pelvic ultrasound=5/8/17:  FINDINGS  The uterus is retroverted. The uterus measures 8 x 4.7 x 6.7 cm.   Within the right body of the uterus, there is an intramural fibroid  measuring 2.8 x 2.6 x 2.8 cm. Previously, it measured 1.8 x 1.4 x 1.7  cm.     The endometrium measures 14 mm, and is within normal limits for age.  In the fundus toward the left side, there is an echogenic,  nonshadowing, focus in the endometrium measuring 6 x 4 x 4 mm. This  appearance is unchanged from 2014.      The right ovary measures 3.3 x 2.2 x 2.2 cm. The left ovary measures  3.5 x 2.2 x 2.1 cm. There is blood flow to both ovaries. There is no  free fluid in the pelvis or adnexal masses         IMPRESSION:   1. One intramural fibroid measuring 2.8 cm.  2. Stable, echogenic endometrial focus, 6 mm. Differential includes  polyp, hyperplasia, post-procedural  change. Further evaluation with  sonohysterogram may be helpful.      Procedure:  Endometrial biopsy    Indication: Menometrorrhagia with age >35                     Discussed risk of bleeding, infection, uterine perforation, cramping pain.  Pt agreed to proceed with procedure after all questions answered.    Speculum placed and cervix visualized.  Cervix cleansed with betadine x 3.  Tenaculum placed on anterior lip of the cervix.  Endometrial biopsy pipelle unable to be passed through cervix.  Cervical dilator used and unable to be passed through.  Tenaculum removed from the cervix and sites hemostatic.  No bleeding noted from cervical os.     Patient tolerated the procedure well.        Assessment  1.)  Menometrorrhagia  2.)  Uterine fibroid  3.)  Cervical os stenosis      Plan  1.)  Schedule HD&C, Mirena insertion  2.)  Schedule pre-op with PCP      25 minutes was spent face to face with the patient today discussing her history, diagnosis, and follow-up plan as noted above.  Over 50% of the visit was spent in counseling and coordination of care.    Total Visit Time: 35 minutes including counseling and physical exam not including time spent on the procedure.    Nursing notes read and reviewed    Sierra Lane

## 2017-05-24 ENCOUNTER — TELEPHONE (OUTPATIENT)
Dept: OBGYN | Facility: OTHER | Age: 47
End: 2017-05-24

## 2017-05-24 NOTE — TELEPHONE ENCOUNTER
Surgery Scheduled    Date of Surgery 07/13/17 Time of Surgery 1:30pm  Procedure: HD&C, Mirena Insertion  Hospital/Surgical Facility: [facility]  Surgeon: Dr Lane  Type of Anesthesia Anticipated: MAC  Pre-Op: 07/10/17 with Dr Knox   Post-Op: 07/28/17 with Dr Lane  Pre-Certification -to be completed  Consent Signed -to be completed  Hospital Stay -same day procedure    Surgery Packet (and/or) Colonscopy Prep (was given/or mailed) to patient. Patient was also instructed to arrive 1 hour(s) prior to surgery.  Patient understood and agrees to the plan.      Anne Briseno  Specialty    __________________________________________  Surgery Pre-Certification    Medical Record Number: [id number]  Kami Natarajan  YOB: 1970   Phone: [phone] (home) [phone] (work)  Primary Provider: Valerio Knox    Reason for Admit:  Gyn Procedure    Surgeon: Dr Lane  Surgical Procedure: HD&C, Mirena Insertion  ICD-9 Coded: N92.0 & N88.2  Date of Surgery: 07/13/17  Consent signed? No    Date signed:   Hospital: [facility]  Outpatient    Requestor:  Hawa Briseno     Location:  [facility]

## 2017-06-30 NOTE — PROGRESS NOTES
[facility] [place]  [address], [address]  [place] [address]  [phone]  Dept: [phone]    PRE-OP EVALUATION:  Today's date: 7/10/2017    Kami Natarajan (: 1970) presents for pre-operative evaluation assessment as requested by Dr. Lane.  She requires evaluation and anesthesia risk assessment prior to undergoing surgery/procedure for treatment of irregular menstrual bleeding.  Proposed procedure: Hysteroscopy, D&C, Mirena insertion    Date of Surgery/ Procedure: 2017  Time of Surgery/ Procedure: 1:30pm  [facility]  Fax number for surgical facility:   Primary Physician: Valerio Knox  Type of Anesthesia Anticipated: General    Patient has a Health Care Directive or Living Will:  NO    Preop Questions 7/10/2017   1.  Do you have a history of heart attack, stroke, stent, bypass or surgery on an artery in the head, neck, heart or legs? No   2.  Do you ever have any pain or discomfort in your chest? No   3.  Do you have a history of  Heart Failure? No   4.   Are you troubled by shortness of breath when:  walking on a level surface, or up a slight hill, or at night? YES - from asthma   5.  Do you currently have a cold, bronchitis or other respiratory infection? No   6.  Do you have a cough, shortness of breath, or wheezing? YES - from asthma   7.  Do you sometimes get pains in the calves of your legs when you walk? No   8. Do you or anyone in your family have previous history of blood clots? YES - mother in the past   9.  Do you or does anyone in your family have a serious bleeding problem such as prolonged bleeding following surgeries or cuts? No   10. Have you ever had problems with anemia or been told to take iron pills? No   11. Have you had any abnormal blood loss such as black, tarry or bloody stools, or abnormal vaginal bleeding? YES - currently being evaluated   12. Have you ever had a blood transfusion? No   13.  Have you or any of your relatives ever had problems with anesthesia? No   14. Do you have sleep apnea, excessive snoring or daytime drowsiness? No   15. Do you have any prosthetic heart valves? No   16. Do you have prosthetic joints? No   17. Is there any chance that you may be pregnant? No         HPI:                                                      Brief HPI related to upcoming procedure:     Kami Natarajan so 47-year-old patient seen for preoperative evaluation prior to undergoing hysteroscopy and D&C for ongoing problems with menorrhagia with some irregularity of periods. Endometrial biopsy was not satisfactory for diagnosis as an outpatient. She's had no pelvic pain. There is no pregnancy. Following evaluation and diagnosis, placement of a Mirena IUD is planned for control of endometrial proliferation and reduction of heavy menses.      See problem list for active medical problems.  Problems all longstanding and stable, except as noted/documented.  See ROS for pertinent symptoms related to these conditions.                                                                                                  .    MEDICAL HISTORY:                                                      Patient Active Problem List    Diagnosis Date Noted     Mild major depression (H) 07/31/2015     Priority: Medium     Metrorrhagia 02/14/2014     Priority: Medium     Anxiety 02/12/2014     Priority: Medium     Menstrual changes 02/12/2014     Priority: Medium     Postoperative hypothyroidism 11/13/2012     Priority: Medium     Postsurgical hypoparathyroidism (H) 11/13/2012     Priority: Medium     S/P complete thyroidectomy 09/25/2012     Priority: Medium     Hypocalcemia 09/25/2012     Priority: Medium     Papillary thyroid carcinoma (H) 08/08/2012     Priority: Medium     Thyroid cancer (H) 07/24/2012     Priority: Medium     Thyroid nodule 06/28/2012     Priority: Medium     Plantar fasciitis 05/10/2012     Priority: Medium     Hip  strain 05/10/2012     Priority: Medium     Multiple respiratory allergies 12/13/2011     Priority: Medium     Mild persistent asthma 10/21/2011     Priority: Medium     CARDIOVASCULAR SCREENING; LDL GOAL LESS THAN 160 10/21/2011     Priority: Medium     Hirsutism 10/21/2011     Priority: Medium     Alpha-1-antitrypsin deficiency carrier (H) 10/21/2011     Priority: Medium      Past Medical History:   Diagnosis Date     Alpha-1-antitrypsin deficiency (H)      Asthma     not hospitalized for this     Mild persistent asthma      Past Surgical History:   Procedure Laterality Date     HERNIA REPAIR, INGUINAL RT/LT  infant     THYROIDECTOMY  8/8/2012    Procedure: THYROIDECTOMY;  Total Thyroidectomy and Central Neck Dissection;  Surgeon: Philomena Ruiz MD;  Location: [facility] OR     Current Outpatient Prescriptions   Medication Sig Dispense Refill     levothyroxine (SYNTHROID, LEVOTHROID) 200 MCG tablet Take 1 tablet (200 mcg) by mouth daily 90 tablet 3     calcitRIOL (ROCALTROL) 0.25 MCG capsule Take one capsule by mouth twice daily 180 capsule 4     montelukast (SINGULAIR) 10 MG tablet TAKE ONE TABLET BY MOUTH EVERY NIGHT AT BEDTIME FOR ALLERGY AND ASTHMA CONTROL 90 tablet 2     fluticasone-salmeterol (ADVAIR DISKUS) 250-50 MCG/DOSE diskus inhaler INHALE 1 PUFF BY INHALATION ROUTE EVERY 12 HOURS 3 Inhaler 2     albuterol (PROAIR HFA, PROVENTIL HFA, VENTOLIN HFA) 108 (90 BASE) MCG/ACT inhaler Inhale 2 puffs into the lungs every 4 hours as needed for shortness of breath / dyspnea 1 Inhaler 6     Fluticasone Propionate (FLONASE NA)        loratadine (CLARITIN) 10 MG tablet Take 10 mg by mouth daily       calcium 600 MG tablet Take 2 tablets by mouth 2 times daily       ORDER FOR DME [id number]  Plantar fasciits,ngt  Splt,lg   1 each 0     OTC products: None, except as noted above    No Known Allergies   Latex Allergy: NO    Social History   Substance Use Topics     Smoking status: Former Smoker     Smokeless tobacco:  "Never Used     Alcohol use Yes      Comment: occasionally     History   Drug Use No       REVIEW OF SYSTEMS:                                                    C: NEGATIVE for fever, chills, change in weight  I: NEGATIVE for worrisome rashes, moles or lesions  E: NEGATIVE for vision changes or irritation  E/M: NEGATIVE for ear, mouth and throat problems  RESP: Well-controlled asthma with the use of Advair for long-term maintenance. Occasional use of albuterol is needed.  B: NEGATIVE for masses, tenderness or discharge  CV: NEGATIVE for chest pain, palpitations or peripheral edema  GI: NEGATIVE for nausea, abdominal pain, heartburn, or change in bowel habits  : NEGATIVE for frequency, dysuria, or hematuria   female: As above. Pelvic ultrasound showed normal ovaries. She has an intramural fibroid of moderate size. She has an echogenic endometrial focus of 6 mm with multiple possibilities on differential diagnosis.  M: NEGATIVE for significant arthralgias or myalgia  N: NEGATIVE for weakness, dizziness or paresthesias  E: NEGATIVE for temperature intolerance, skin/hair changes  ENDOCRINE: Is post thyroidectomy was postsurgical hypoparathyroidism. She is on supplemental therapy for thyroid replacement as well as calcium management.  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 This is all resolved and she is off medication. She has been off medication for more than one year and remained stable.    EXAM:                                                    /68  Pulse 68  Temp 97.7  F (36.5  C) (Temporal)  Resp 20  Ht 5' 6.25\" (1.683 m)  Wt 233 lb 6.4 oz (105.9 kg)  BMI 37.39 kg/m2    GENERAL APPEARANCE: healthy, alert and no distress     EYES: EOMI, PERRL     HENT: ear canals and TM's normal and nose and mouth without ulcers or lesions     NECK: no adenopathy, no asymmetry, " masses, or scars and thyroid normal to palpation     RESP: lungs clear to auscultation - no rales, rhonchi or wheezes     CV: regular rates and rhythm, normal S1 S2, no S3 or S4 and no murmur, click or rub     ABDOMEN:  soft, nontender, no HSM or masses and bowel sounds normal     GU_female: Gynecologic exam deferred.     MS: extremities normal- no gross deformities noted, no evidence of inflammation in joints, FROM in all extremities.     SKIN: no suspicious lesions or rashes     NEURO: Normal strength and tone, sensory exam grossly normal, mentation intact and speech normal     PSYCH: mentation appears normal. and affect normal/bright     LYMPHATICS: No axillary, cervical, or supraclavicular nodes    DIAGNOSTICS:                                                      Labs Resulted Today:   Results for orders placed or performed in visit on 07/10/17   CBC with platelets   Result Value Ref Range    WBC 8.1 4.0 - 11.0 10e9/L    RBC Count 4.05 3.8 - 5.2 10e12/L    Hemoglobin 11.9 11.7 - 15.7 g/dL    Hematocrit 35.5 35.0 - 47.0 %    MCV 88 78 - 100 fl    MCH 29.4 26.5 - 33.0 pg    MCHC 33.5 31.5 - 36.5 g/dL    RDW 12.7 10.0 - 15.0 %    Platelet Count 238 150 - 450 10e9/L   Basic metabolic panel   Result Value Ref Range    Sodium 141 133 - 144 mmol/L    Potassium 3.7 3.4 - 5.3 mmol/L    Chloride 106 94 - 109 mmol/L    Carbon Dioxide 26 20 - 32 mmol/L    Anion Gap 9 3 - 14 mmol/L    Glucose 71 70 - 99 mg/dL    Urea Nitrogen 11 7 - 30 mg/dL    Creatinine 1.03 0.52 - 1.04 mg/dL    GFR Estimate 57 (L) >60 mL/min/1.7m2    GFR Estimate If Black 69 >60 mL/min/1.7m2    Calcium 9.1 8.5 - 10.1 mg/dL       Recent Labs   Lab Test  04/14/17   1034  04/29/16   1056   11/05/14   1300   HGB  12.6  12.3   < >   --    PLT  302  267   < >   --    NA  141  138   < >   --    POTASSIUM  4.1  3.7   < >   --    CR  0.94  0.91   < >   --    A1C   --    --    --   5.3    < > = values in this interval not displayed.        IMPRESSION:                                                     Reason for surgery/procedure:     Irregular menstrual bleeding, menorrhagia //  Hysteroscopy, dilatation and curettage of the uterus and placement of a Mirena IUD.    Diagnosis/reason for consult:     (Z01.818) Preop general physical exam  (primary encounter diagnosis)  Comment:  Preoperative evaluation completed and findings as noted.  Plan: Cleared for proposed surgery.    (N93.9) Abnormal vaginal bleeding  Comment:  As noted above with a intramural fibroid and some suggestion of endometrial abnormality.  Plan: CBC with platelets            (E89.2) Postsurgical hypoparathyroidism (H)  Comment: Calcium normal.  Plan: Basic metabolic panel            (E89.0) Postoperative hypothyroidism  Comment: TSH has been stable and not rechecked.  Plan:     (J45.30) Mild persistent asthma without complication  Comment: Stable on current maintenance therapy.  Plan:         The proposed surgical procedure is considered INTERMEDIATE risk.    REVISED CARDIAC RISK INDEX  The patient has the following serious cardiovascular risks for perioperative complications such as (MI, PE, VFib and 3  AV Block):  No serious cardiac risks  INTERPRETATION: 0 risks: Class I (very low risk - 0.4% complication rate)    The patient has the following additional risks for perioperative complications:  No identified additional risks        RECOMMENDATIONS:                                                      --Consult hospital rounder / IM to assist post-op medical management    --Patient is to take all scheduled medications on the day of surgery EXCEPT for modifications listed below.    APPROVAL GIVEN to proceed with proposed procedure, without further diagnostic evaluation       Signed Electronically by: Valerio Knox MD    Copy of this evaluation report is provided to requesting physician.    [facility] Preop Guidelines

## 2017-07-10 ENCOUNTER — OFFICE VISIT (OUTPATIENT)
Dept: FAMILY MEDICINE | Facility: CLINIC | Age: 47
End: 2017-07-10
Payer: COMMERCIAL

## 2017-07-10 VITALS
BODY MASS INDEX: 37.51 KG/M2 | TEMPERATURE: 97.7 F | WEIGHT: 233.4 LBS | SYSTOLIC BLOOD PRESSURE: 108 MMHG | HEART RATE: 68 BPM | DIASTOLIC BLOOD PRESSURE: 68 MMHG | HEIGHT: 66 IN | RESPIRATION RATE: 20 BRPM

## 2017-07-10 DIAGNOSIS — Z01.818 PREOP GENERAL PHYSICAL EXAM: Primary | ICD-10-CM

## 2017-07-10 DIAGNOSIS — E89.2 POSTSURGICAL HYPOPARATHYROIDISM (H): ICD-10-CM

## 2017-07-10 DIAGNOSIS — N93.9 ABNORMAL VAGINAL BLEEDING: ICD-10-CM

## 2017-07-10 DIAGNOSIS — E89.0 POSTOPERATIVE HYPOTHYROIDISM: ICD-10-CM

## 2017-07-10 DIAGNOSIS — J45.30 MILD PERSISTENT ASTHMA WITHOUT COMPLICATION: ICD-10-CM

## 2017-07-10 LAB
ANION GAP SERPL CALCULATED.3IONS-SCNC: 9 MMOL/L (ref 3–14)
BUN SERPL-MCNC: 11 MG/DL (ref 7–30)
CALCIUM SERPL-MCNC: 9.1 MG/DL (ref 8.5–10.1)
CHLORIDE SERPL-SCNC: 106 MMOL/L (ref 94–109)
CO2 SERPL-SCNC: 26 MMOL/L (ref 20–32)
CREAT SERPL-MCNC: 1.03 MG/DL (ref 0.52–1.04)
ERYTHROCYTE [DISTWIDTH] IN BLOOD BY AUTOMATED COUNT: 12.7 % (ref 10–15)
GFR SERPL CREATININE-BSD FRML MDRD: 57 ML/MIN/1.7M2
GLUCOSE SERPL-MCNC: 71 MG/DL (ref 70–99)
HCT VFR BLD AUTO: 35.5 % (ref 35–47)
HGB BLD-MCNC: 11.9 G/DL (ref 11.7–15.7)
MCH RBC QN AUTO: 29.4 PG (ref 26.5–33)
MCHC RBC AUTO-ENTMCNC: 33.5 G/DL (ref 31.5–36.5)
MCV RBC AUTO: 88 FL (ref 78–100)
PLATELET # BLD AUTO: 238 10E9/L (ref 150–450)
POTASSIUM SERPL-SCNC: 3.7 MMOL/L (ref 3.4–5.3)
RBC # BLD AUTO: 4.05 10E12/L (ref 3.8–5.2)
SODIUM SERPL-SCNC: 141 MMOL/L (ref 133–144)
WBC # BLD AUTO: 8.1 10E9/L (ref 4–11)

## 2017-07-10 PROCEDURE — 99215 OFFICE O/P EST HI 40 MIN: CPT | Performed by: FAMILY MEDICINE

## 2017-07-10 PROCEDURE — 36415 COLL VENOUS BLD VENIPUNCTURE: CPT | Performed by: FAMILY MEDICINE

## 2017-07-10 PROCEDURE — 85027 COMPLETE CBC AUTOMATED: CPT | Performed by: FAMILY MEDICINE

## 2017-07-10 PROCEDURE — 80048 BASIC METABOLIC PNL TOTAL CA: CPT | Performed by: FAMILY MEDICINE

## 2017-07-10 ASSESSMENT — ANXIETY QUESTIONNAIRES
2. NOT BEING ABLE TO STOP OR CONTROL WORRYING: NOT AT ALL
5. BEING SO RESTLESS THAT IT IS HARD TO SIT STILL: NOT AT ALL
7. FEELING AFRAID AS IF SOMETHING AWFUL MIGHT HAPPEN: NOT AT ALL
GAD7 TOTAL SCORE: 0
6. BECOMING EASILY ANNOYED OR IRRITABLE: NOT AT ALL
GAD7 TOTAL SCORE: 0
3. WORRYING TOO MUCH ABOUT DIFFERENT THINGS: NOT AT ALL
7. FEELING AFRAID AS IF SOMETHING AWFUL MIGHT HAPPEN: NOT AT ALL
4. TROUBLE RELAXING: NOT AT ALL
1. FEELING NERVOUS, ANXIOUS, OR ON EDGE: NOT AT ALL
GAD7 TOTAL SCORE: 0

## 2017-07-10 ASSESSMENT — PATIENT HEALTH QUESTIONNAIRE - PHQ9
SUM OF ALL RESPONSES TO PHQ QUESTIONS 1-9: 0
SUM OF ALL RESPONSES TO PHQ QUESTIONS 1-9: 0
10. IF YOU CHECKED OFF ANY PROBLEMS, HOW DIFFICULT HAVE THESE PROBLEMS MADE IT FOR YOU TO DO YOUR WORK, TAKE CARE OF THINGS AT HOME, OR GET ALONG WITH OTHER PEOPLE: NOT DIFFICULT AT ALL

## 2017-07-10 ASSESSMENT — PAIN SCALES - GENERAL: PAINLEVEL: NO PAIN (0)

## 2017-07-10 NOTE — MR AVS SNAPSHOT
After Visit Summary   7/10/2017    Kami Natarajan    MRN: [id number]           Patient Information     Date Of Birth          1970        Visit Information        Provider Department      7/10/2017 10:00 AM Valerio Knox MD [facility] [place]        Today's Diagnoses     Preop general physical exam    -  1    Abnormal vaginal bleeding        Postsurgical hypoparathyroidism (H)        Postoperative hypothyroidism        Mild persistent asthma without complication          Care Instructions      Before Your Surgery      Call your surgeon if there is any change in your health. This includes signs of a cold or flu (such as a sore throat, runny nose, cough, rash or fever).    Do not smoke, drink alcohol or take over the counter medicine (unless your surgeon or primary care doctor tells you to) for the 24 hours before and after surgery.    If you take prescribed drugs: Follow your doctor s orders about which medicines to take and which to stop until after surgery.    Eating and drinking prior to surgery: follow the instructions from your surgeon    Take a shower or bath the night before surgery. Use the soap your surgeon gave you to gently clean your skin. If you do not have soap from your surgeon, use your regular soap. Do not shave or scrub the surgery site.  Wear clean pajamas and have clean sheets on your bed.           Follow-ups after your visit        Your next 10 appointments already scheduled     Jul 13, 2017   Procedure with Sierra Lane DO   [facility] [place]    [address] [place] [address]   [phone]            Jul 28, 2017 10:00 AM CDT   SHORT with Sierra Lane DO   [facility] [place] ([facility] [place])    [address] [facility] [facility] [address]  [address]  [place] [address]   [phone]            Oct 16, 2017  4:50 PM CDT   LAB with LAB FIRST FLOOR [facility] ([facility])    [address]  [facility] [address] [phone]           Patient must bring picture ID.  Patient should be prepared to give a urine specimen  Please do not eat 10-12 hours before your appointment if you are coming in fasting for labs on lipids, cholesterol, or glucose (sugar).  Pregnant women should follow their Care Team instructions. Water with medications is okay. Do not drink coffee or other fluids.   If you have concerns about taking  your medications, please ask at office or if scheduling via SCREEMO, send a message by clicking on Secure Messaging, Message Your Care Team.            Oct 23, 2017  8:00 AM CDT   Return Visit with Anju Jacobs MD   [facility] ([facility])    [address] [facility] [address] [phone]              Who to contact     If you have questions or need follow up information about today's clinic visit or your schedule please contact [facility] directly at [phone].  Normal or non-critical lab and imaging results will be communicated to you by NeurOpticshart, letter or phone within 4 business days after the clinic has received the results. If you do not hear from us within 7 days, please contact the clinic through Exactert or phone. If you have a critical or abnormal lab result, we will notify you by phone as soon as possible.  Submit refill requests through SCREEMO or call your pharmacy and they will forward the refill request to us. Please allow 3 business days for your refill to be completed.          Additional Information About Your Visit        SCREEMO Information     SCREEMO gives you secure access to your electronic health record. If you see a primary care provider, you can also send messages to your care team and make appointments. If you have questions, please call your primary care clinic.  If you do not have a primary care provider, please call [phone] and they will assist you.        Care EveryWhere  "ID     This is your Care EveryWhere ID. This could be used by other organizations to access your [place] medical records  [id number]        Your Vitals Were     Pulse Temperature Respirations Height BMI (Body Mass Index)       68 97.7  F (36.5  C) (Temporal) 20 5' 6.25\" (1.683 m) 37.39 kg/m2        Blood Pressure from Last 3 Encounters:   07/10/17 108/68   05/23/17 106/71   05/05/17 114/78    Weight from Last 3 Encounters:   07/10/17 233 lb 6.4 oz (105.9 kg)   05/23/17 237 lb (107.5 kg)   05/05/17 256 lb (116.1 kg)              We Performed the Following     Basic metabolic panel     CBC with platelets        Primary Care Provider Office Phone # Fax #    Valerio Knox [phone]       [facility] [id number] [place] [address]        Equal Access to Services     CARRIE SIERRA : Hadii aad ku hadasho Soomaali, waaxda luqadaha, qaybta kaalmada adeegyada, waxay idiin hayaan indianaeg mike richardson . So [facility] [phone].    ATENCIÓN: Si habla español, tiene a lange disposición servicios gratuitos de asistencia lingüística. Llame al [phone].    We comply with applicable federal civil rights laws and [place] laws. We do not discriminate on the basis of race, color, national origin, age, disability sex, sexual orientation or gender identity.            Thank you!     Thank you for choosing [facility]  for your care. Our goal is always to provide you with excellent care. Hearing back from our patients is one way we can continue to improve our services. Please take a few minutes to complete the written survey that you may receive in the mail after your visit with us. Thank you!             Your Updated Medication List - Protect others around you: Learn how to safely use, store and throw away your medicines at www.disposemymeds.org.          This list is accurate as of: 7/10/17 10:40 AM.  Always use your most recent med list.                   Brand Name Dispense " Instructions for use Diagnosis    albuterol 108 (90 BASE) MCG/ACT Inhaler    PROAIR HFA/PROVENTIL HFA/VENTOLIN HFA    1 Inhaler    Inhale 2 puffs into the lungs every 4 hours as needed for shortness of breath / dyspnea    Mild persistent asthma without complication       calcitRIOL 0.25 MCG capsule    ROCALTROL    180 capsule    Take one capsule by mouth twice daily    S/P complete thyroidectomy, Hypocalcemia, Postoperative hypothyroidism       calcium 600 MG tablet      Take 2 tablets by mouth 2 times daily        CLARITIN 10 MG tablet   Generic drug:  loratadine      Take 10 mg by mouth daily        FLONASE NA      Spray 1 spray in nostril daily        fluticasone-salmeterol 250-50 MCG/DOSE diskus inhaler    ADVAIR DISKUS    3 Inhaler    INHALE 1 PUFF BY INHALATION ROUTE EVERY 12 HOURS    Mild persistent asthma without complication       levothyroxine 200 MCG tablet    SYNTHROID/LEVOTHROID    90 tablet    Take 1 tablet (200 mcg) by mouth daily    Postoperative hypothyroidism, S/P complete thyroidectomy, Hypocalcemia       montelukast 10 MG tablet    SINGULAIR    90 tablet    TAKE ONE TABLET BY MOUTH EVERY NIGHT AT BEDTIME FOR ALLERGY AND ASTHMA CONTROL    Mild persistent asthma without complication       order for DME     1 each    [id number] Plantar fasciits,ngt Splt,lg    Plantar fasciitis

## 2017-07-10 NOTE — NURSING NOTE
"Chief Complaint   Patient presents with     Pre-Op Exam     Panel Management     PHQ-9/RUDI, ACT       Initial /68  Pulse 68  Temp 97.7  F (36.5  C) (Temporal)  Resp 20  Ht 5' 6.25\" (1.683 m)  Wt 233 lb 6.4 oz (105.9 kg)  BMI 37.39 kg/m2 Estimated body mass index is 37.39 kg/(m^2) as calculated from the following:    Height as of this encounter: 5' 6.25\" (1.683 m).    Weight as of this encounter: 233 lb 6.4 oz (105.9 kg).  Medication Reconciliation: complete  Palak Garg CMA (AAMA)    "

## 2017-07-11 ASSESSMENT — ANXIETY QUESTIONNAIRES: GAD7 TOTAL SCORE: 0

## 2017-07-11 ASSESSMENT — PATIENT HEALTH QUESTIONNAIRE - PHQ9: SUM OF ALL RESPONSES TO PHQ QUESTIONS 1-9: 0

## 2017-07-11 ASSESSMENT — ASTHMA QUESTIONNAIRES: ACT_TOTALSCORE: 22

## 2017-07-12 ENCOUNTER — ANESTHESIA EVENT (OUTPATIENT)
Dept: SURGERY | Facility: AMBULATORY SURGERY CENTER | Age: 47
End: 2017-07-12

## 2017-07-13 ENCOUNTER — SURGERY (OUTPATIENT)
Age: 47
End: 2017-07-13

## 2017-07-13 ENCOUNTER — ANESTHESIA (OUTPATIENT)
Dept: SURGERY | Facility: AMBULATORY SURGERY CENTER | Age: 47
End: 2017-07-13

## 2017-07-13 ENCOUNTER — HOSPITAL ENCOUNTER (OUTPATIENT)
Facility: AMBULATORY SURGERY CENTER | Age: 47
Discharge: HOME | End: 2017-07-13
Attending: OBSTETRICS & GYNECOLOGY | Admitting: OBSTETRICS & GYNECOLOGY
Payer: COMMERCIAL

## 2017-07-13 VITALS
DIASTOLIC BLOOD PRESSURE: 61 MMHG | OXYGEN SATURATION: 100 % | RESPIRATION RATE: 16 BRPM | SYSTOLIC BLOOD PRESSURE: 117 MMHG | TEMPERATURE: 97 F

## 2017-07-13 DIAGNOSIS — G89.18 POST-OP PAIN: Primary | ICD-10-CM

## 2017-07-13 LAB
B-HCG SERPL-ACNC: NORMAL IU/L (ref 0–5)
HCG SERPL QL: NEGATIVE

## 2017-07-13 PROCEDURE — 58300 INSERT INTRAUTERINE DEVICE: CPT | Performed by: OBSTETRICS & GYNECOLOGY

## 2017-07-13 PROCEDURE — 58300 INSERT INTRAUTERINE DEVICE: CPT

## 2017-07-13 PROCEDURE — 58558 HYSTEROSCOPY BIOPSY: CPT

## 2017-07-13 PROCEDURE — 84703 CHORIONIC GONADOTROPIN ASSAY: CPT | Performed by: FAMILY MEDICINE

## 2017-07-13 PROCEDURE — 58558 HYSTEROSCOPY BIOPSY: CPT | Performed by: OBSTETRICS & GYNECOLOGY

## 2017-07-13 PROCEDURE — 88305 TISSUE EXAM BY PATHOLOGIST: CPT | Performed by: OBSTETRICS & GYNECOLOGY

## 2017-07-13 PROCEDURE — G8907 PT DOC NO EVENTS ON DISCHARG: HCPCS

## 2017-07-13 PROCEDURE — G8918 PT W/O PREOP ORDER IV AB PRO: HCPCS

## 2017-07-13 DEVICE — IMPLANTABLE DEVICE: Type: IMPLANTABLE DEVICE | Site: UTERUS | Status: FUNCTIONAL

## 2017-07-13 RX ORDER — HYDROCODONE BITARTRATE AND ACETAMINOPHEN 5; 325 MG/1; MG/1
1-2 TABLET ORAL EVERY 4 HOURS PRN
Qty: 10 TABLET | Refills: 0 | Status: SHIPPED | OUTPATIENT
Start: 2017-07-13 | End: 2017-11-01

## 2017-07-13 RX ORDER — NALOXONE HYDROCHLORIDE 0.4 MG/ML
.1-.4 INJECTION, SOLUTION INTRAMUSCULAR; INTRAVENOUS; SUBCUTANEOUS
Status: DISCONTINUED | OUTPATIENT
Start: 2017-07-13 | End: 2017-07-14 | Stop reason: HOSPADM

## 2017-07-13 RX ORDER — PHYSOSTIGMINE SALICYLATE 1 MG/ML
1.2 INJECTION INTRAVENOUS
Status: DISCONTINUED | OUTPATIENT
Start: 2017-07-13 | End: 2017-07-14 | Stop reason: HOSPADM

## 2017-07-13 RX ORDER — ONDANSETRON 2 MG/ML
4 INJECTION INTRAMUSCULAR; INTRAVENOUS EVERY 30 MIN PRN
Status: DISCONTINUED | OUTPATIENT
Start: 2017-07-13 | End: 2017-07-14 | Stop reason: HOSPADM

## 2017-07-13 RX ORDER — FENTANYL CITRATE 50 UG/ML
25-50 INJECTION, SOLUTION INTRAMUSCULAR; INTRAVENOUS EVERY 5 MIN PRN
Status: DISCONTINUED | OUTPATIENT
Start: 2017-07-13 | End: 2017-07-14 | Stop reason: HOSPADM

## 2017-07-13 RX ORDER — HYDROMORPHONE HYDROCHLORIDE 1 MG/ML
.3-.5 INJECTION, SOLUTION INTRAMUSCULAR; INTRAVENOUS; SUBCUTANEOUS EVERY 10 MIN PRN
Status: DISCONTINUED | OUTPATIENT
Start: 2017-07-13 | End: 2017-07-14 | Stop reason: HOSPADM

## 2017-07-13 RX ORDER — ACETAMINOPHEN 325 MG/1
975 TABLET ORAL ONCE
Status: DISCONTINUED | OUTPATIENT
Start: 2017-07-13 | End: 2017-07-14 | Stop reason: HOSPADM

## 2017-07-13 RX ORDER — IBUPROFEN 600 MG/1
600 TABLET, FILM COATED ORAL EVERY 6 HOURS PRN
Qty: 30 TABLET | Refills: 0 | Status: SHIPPED | OUTPATIENT
Start: 2017-07-13 | End: 2017-11-01

## 2017-07-13 RX ORDER — DEXAMETHASONE SODIUM PHOSPHATE 4 MG/ML
4 INJECTION, SOLUTION INTRA-ARTICULAR; INTRALESIONAL; INTRAMUSCULAR; INTRAVENOUS; SOFT TISSUE EVERY 10 MIN PRN
Status: DISCONTINUED | OUTPATIENT
Start: 2017-07-13 | End: 2017-07-14 | Stop reason: HOSPADM

## 2017-07-13 RX ORDER — SODIUM CHLORIDE, SODIUM LACTATE, POTASSIUM CHLORIDE, CALCIUM CHLORIDE 600; 310; 30; 20 MG/100ML; MG/100ML; MG/100ML; MG/100ML
INJECTION, SOLUTION INTRAVENOUS CONTINUOUS
Status: DISCONTINUED | OUTPATIENT
Start: 2017-07-13 | End: 2017-07-14 | Stop reason: HOSPADM

## 2017-07-13 RX ORDER — ONDANSETRON 4 MG/1
4 TABLET, ORALLY DISINTEGRATING ORAL EVERY 30 MIN PRN
Status: DISCONTINUED | OUTPATIENT
Start: 2017-07-13 | End: 2017-07-14 | Stop reason: HOSPADM

## 2017-07-13 RX ORDER — LIDOCAINE 40 MG/G
CREAM TOPICAL
Status: DISCONTINUED | OUTPATIENT
Start: 2017-07-13 | End: 2017-07-14 | Stop reason: HOSPADM

## 2017-07-13 RX ORDER — GABAPENTIN 300 MG/1
300 CAPSULE ORAL ONCE
Status: DISCONTINUED | OUTPATIENT
Start: 2017-07-13 | End: 2017-07-14 | Stop reason: HOSPADM

## 2017-07-13 RX ORDER — IBUPROFEN 600 MG/1
600 TABLET, FILM COATED ORAL
Status: DISCONTINUED | OUTPATIENT
Start: 2017-07-13 | End: 2017-07-14 | Stop reason: HOSPADM

## 2017-07-13 RX ORDER — FENTANYL CITRATE 50 UG/ML
INJECTION, SOLUTION INTRAMUSCULAR; INTRAVENOUS PRN
Status: DISCONTINUED | OUTPATIENT
Start: 2017-07-13 | End: 2017-07-13

## 2017-07-13 RX ORDER — OXYCODONE HYDROCHLORIDE 5 MG/1
5 TABLET ORAL EVERY 4 HOURS PRN
Status: DISCONTINUED | OUTPATIENT
Start: 2017-07-13 | End: 2017-07-14 | Stop reason: HOSPADM

## 2017-07-13 RX ORDER — LIDOCAINE HYDROCHLORIDE 20 MG/ML
INJECTION, SOLUTION INFILTRATION; PERINEURAL PRN
Status: DISCONTINUED | OUTPATIENT
Start: 2017-07-13 | End: 2017-07-13

## 2017-07-13 RX ORDER — MEPERIDINE HYDROCHLORIDE 25 MG/ML
12.5 INJECTION INTRAMUSCULAR; INTRAVENOUS; SUBCUTANEOUS
Status: DISCONTINUED | OUTPATIENT
Start: 2017-07-13 | End: 2017-07-14 | Stop reason: HOSPADM

## 2017-07-13 RX ORDER — METOPROLOL TARTRATE 1 MG/ML
1-2 INJECTION, SOLUTION INTRAVENOUS EVERY 5 MIN PRN
Status: DISCONTINUED | OUTPATIENT
Start: 2017-07-13 | End: 2017-07-14 | Stop reason: HOSPADM

## 2017-07-13 RX ORDER — ONDANSETRON 2 MG/ML
INJECTION INTRAMUSCULAR; INTRAVENOUS PRN
Status: DISCONTINUED | OUTPATIENT
Start: 2017-07-13 | End: 2017-07-13

## 2017-07-13 RX ORDER — PROPOFOL 10 MG/ML
INJECTION, EMULSION INTRAVENOUS CONTINUOUS PRN
Status: DISCONTINUED | OUTPATIENT
Start: 2017-07-13 | End: 2017-07-13

## 2017-07-13 RX ORDER — HYDROCODONE BITARTRATE AND ACETAMINOPHEN 5; 325 MG/1; MG/1
1-2 TABLET ORAL
Status: DISCONTINUED | OUTPATIENT
Start: 2017-07-13 | End: 2017-07-14 | Stop reason: HOSPADM

## 2017-07-13 RX ORDER — ALBUTEROL SULFATE 0.83 MG/ML
2.5 SOLUTION RESPIRATORY (INHALATION) EVERY 4 HOURS PRN
Status: DISCONTINUED | OUTPATIENT
Start: 2017-07-13 | End: 2017-07-14 | Stop reason: HOSPADM

## 2017-07-13 RX ORDER — KETOROLAC TROMETHAMINE 30 MG/ML
INJECTION, SOLUTION INTRAMUSCULAR; INTRAVENOUS PRN
Status: DISCONTINUED | OUTPATIENT
Start: 2017-07-13 | End: 2017-07-13

## 2017-07-13 RX ORDER — PROPOFOL 10 MG/ML
INJECTION, EMULSION INTRAVENOUS PRN
Status: DISCONTINUED | OUTPATIENT
Start: 2017-07-13 | End: 2017-07-13

## 2017-07-13 RX ORDER — HYDRALAZINE HYDROCHLORIDE 20 MG/ML
2.5-5 INJECTION INTRAMUSCULAR; INTRAVENOUS EVERY 10 MIN PRN
Status: DISCONTINUED | OUTPATIENT
Start: 2017-07-13 | End: 2017-07-14 | Stop reason: HOSPADM

## 2017-07-13 RX ORDER — FENTANYL CITRATE 50 UG/ML
25-50 INJECTION, SOLUTION INTRAMUSCULAR; INTRAVENOUS
Status: DISCONTINUED | OUTPATIENT
Start: 2017-07-13 | End: 2017-07-14 | Stop reason: HOSPADM

## 2017-07-13 RX ORDER — DEXAMETHASONE SODIUM PHOSPHATE 4 MG/ML
INJECTION, SOLUTION INTRA-ARTICULAR; INTRALESIONAL; INTRAMUSCULAR; INTRAVENOUS; SOFT TISSUE PRN
Status: DISCONTINUED | OUTPATIENT
Start: 2017-07-13 | End: 2017-07-13

## 2017-07-13 RX ORDER — GABAPENTIN 300 MG/1
300 CAPSULE ORAL ONCE
Status: COMPLETED | OUTPATIENT
Start: 2017-07-13 | End: 2017-07-13

## 2017-07-13 RX ORDER — ACETAMINOPHEN 325 MG/1
975 TABLET ORAL ONCE
Status: COMPLETED | OUTPATIENT
Start: 2017-07-13 | End: 2017-07-13

## 2017-07-13 RX ADMIN — ACETAMINOPHEN 975 MG: 325 TABLET ORAL at 12:30

## 2017-07-13 RX ADMIN — PROPOFOL 100 MCG/KG/MIN: 10 INJECTION, EMULSION INTRAVENOUS at 13:53

## 2017-07-13 RX ADMIN — PROPOFOL 50 MG: 10 INJECTION, EMULSION INTRAVENOUS at 13:53

## 2017-07-13 RX ADMIN — FENTANYL CITRATE 50 MCG: 50 INJECTION, SOLUTION INTRAMUSCULAR; INTRAVENOUS at 13:53

## 2017-07-13 RX ADMIN — GABAPENTIN 300 MG: 300 CAPSULE ORAL at 12:30

## 2017-07-13 RX ADMIN — DEXAMETHASONE SODIUM PHOSPHATE 4 MG: 4 INJECTION, SOLUTION INTRA-ARTICULAR; INTRALESIONAL; INTRAMUSCULAR; INTRAVENOUS; SOFT TISSUE at 13:54

## 2017-07-13 RX ADMIN — ONDANSETRON 4 MG: 2 INJECTION INTRAMUSCULAR; INTRAVENOUS at 13:54

## 2017-07-13 RX ADMIN — SODIUM CHLORIDE, SODIUM LACTATE, POTASSIUM CHLORIDE, CALCIUM CHLORIDE: 600; 310; 30; 20 INJECTION, SOLUTION INTRAVENOUS at 13:52

## 2017-07-13 RX ADMIN — LIDOCAINE HYDROCHLORIDE 60 MG: 20 INJECTION, SOLUTION INFILTRATION; PERINEURAL at 13:53

## 2017-07-13 RX ADMIN — KETOROLAC TROMETHAMINE 30 MG: 30 INJECTION, SOLUTION INTRAMUSCULAR; INTRAVENOUS at 13:54

## 2017-07-13 RX ADMIN — SODIUM CHLORIDE, SODIUM LACTATE, POTASSIUM CHLORIDE, CALCIUM CHLORIDE: 600; 310; 30; 20 INJECTION, SOLUTION INTRAVENOUS at 13:08

## 2017-07-13 NOTE — ANESTHESIA PREPROCEDURE EVALUATION
Anesthesia Evaluation     . Pt has had prior anesthetic. Type: General           ROS/MED HX    ENT/Pulmonary:     (+)Mild Persistent asthma , . .    Neurologic:  - neg neurologic ROS     Cardiovascular:  - neg cardiovascular ROS       METS/Exercise Tolerance:     Hematologic:  - neg hematologic  ROS       Musculoskeletal:  - neg musculoskeletal ROS       GI/Hepatic:  - neg GI/hepatic ROS       Renal/Genitourinary:  - ROS Renal section negative       Endo:     (+) thyroid problem hypothyroidism, .      Psychiatric:  - neg psychiatric ROS       Infectious Disease:  - neg infectious disease ROS       Malignancy:   (+) Malignancy History of Other  Other CA Thyroid status post Surgery         Other:    (+) No chance of pregnancy                    Physical Exam  Normal systems: cardiovascular, pulmonary and dental    Airway   Mallampati: I  TM distance: >3 FB  Neck ROM: full    Dental     Cardiovascular       Pulmonary    breath sounds clear to auscultation                    Anesthesia Plan      History & Physical Review  History and physical reviewed and following examination; no interval change.    ASA Status:  3 .    NPO Status:  > 8 hours    Plan for MAC with Intravenous and Propofol induction. Maintenance will be TIVA.  Reason for MAC:  Deep or markedly invasive procedure (G8) and Chronic cardiopulmonary disease (G9)  PONV prophylaxis:  Ondansetron (or other 5HT-3) and Dexamethasone or Solumedrol       Postoperative Care  Postoperative pain management:  Multi-modal analgesia.      Consents  Anesthetic plan, risks, benefits and alternatives discussed with:  Patient..                          .

## 2017-07-13 NOTE — INTERVAL H&P NOTE
I discussed risks, benefits, and complications of surgery including but not limited to bleeding, infection, damage to nearby organs including but not limited to bladder, bowel, ureters, nerves, and blood vessels as well as anesthesia risks and uterine perforation.  Injury may result at the time of surgery or in a separate procedure.  We also discussed the possibility of a reoperation if the pathology came back abnormal.  All questions answered, and accepting these risks, the patient elects to proceed with the procedure.        Sierra Lane  HcG=negative

## 2017-07-13 NOTE — IP AVS SNAPSHOT
MRN:[id number]                      After Visit Summary   7/13/2017    Kami Natarajan    MRN: [id number]           Thank you!     Thank you for choosing [facility] for your care. Our goal is always to provide you with excellent care. Hearing back from our patients is one way we can continue to improve our services. Please take a few minutes to complete the written survey that you may receive in the mail after you visit with us. Thank you!        Patient Information     Date Of Birth          1970        About your hospital stay     You were admitted on:  July 13, 2017 You last received care in the:  [facility]    You were discharged on:  July 13, 2017       Who to Call     For medical emergencies, please call 911.  For non-urgent questions about your medical care, please call your primary care provider or clinic, [phone]  For questions related to your surgery, please call your surgery clinic        Attending Provider     Provider Sierra Peters DO OB/Gyn       Primary Care Provider Office Phone # Fax #    Valerio Knox [phone]      After Care Instructions     Discharge Instructions       Pelvic Rest. No tampons, douching or intercourse for  4 weeks.            Discharge Instructions       Patient may return to work POST OP DAY 1            Discharge Instructions       Patient may drive beginning POD  1            Discharge Instructions       Patient to arrange follow up appointment in 2 weeks            Shower        Shower on Post-op day  1.   DO NOT take a bath                  Your next 10 appointments already scheduled     Jul 28, 2017 10:00 AM CDT   SHORT with Sierra Lane DO   [facility] [place] ([facility])    [address]  [address]  [place] [address]   [phone]            Oct 16, 2017  4:50 PM CDT   LAB with LAB FIRST FLOOR [facility]  [facility])    [address] [place] [id number] [phone]           Patient must bring picture ID.  Patient should be prepared to give a urine specimen  Please do not eat 10-12 hours before your appointment if you are coming in fasting for labs on lipids, cholesterol, or glucose (sugar).  Pregnant women should follow their Care Team instructions. Water with medications is okay. Do not drink coffee or other fluids.   If you have concerns about taking  your medications, please ask at office or if scheduling via Ziploop, send a message by clicking on Secure Messaging, Message Your Care Team.            Oct 23, 2017  8:00 AM CDT   Return Visit with Anju Jacobs MD   [facility] ([facility])    [address] [place] [id number] [phone]              Further instructions from your care team       [facility]  Same-Day Surgery   Adult Discharge Orders & Instructions   For 24 hours after surgery  1. Get plenty of rest.  A responsible adult must stay with you for at least 24 hours after you leave the hospital.   2. Do not drive or use heavy equipment.  If you have weakness or tingling, don't drive or use heavy equipment until this feeling goes away.  3. Do not drink alcohol.  4. Avoid strenuous or risky activities.  Ask for help when climbing stairs.   5. You may feel lightheaded.  IF so, sit for a few minutes before standing.  Have someone help you get up.   6. If you have nausea (feel sick to your stomach): Drink only clear liquids such as apple juice, ginger ale, broth or 7-Up.  Rest may also help.  Be sure to drink enough fluids.  Move to a regular diet as you feel able.  7. You may have a slight fever. Call the doctor if your fever is over 100 F (37.7 C) (taken under the tongue) or lasts longer than 24 hours.  8. You may have a dry mouth, a sore throat, muscle aches or trouble sleeping.  These should go away after 24  hours.  9. Do not make important or legal decisions.   Call your doctor for any of the followin.  Signs of infection (fever, growing tenderness at the surgery site, a large amount of drainage or bleeding, severe pain, foul-smelling drainage, redness, swelling).    2. It has been over 8 to 10 hours since surgery and you are still not able to urinate (pass water).    3.  Headache for over 24 hours.    4.  Numbness, tingling or weakness the day after surgery (if you had spinal anesthesia).  [phone]    Pending Results     No orders found from 2017 to 2017.            Admission Information     Date & Time Provider Department Dept. Phone    2017 Sierra Lane, DO [facility] [phone]      Your Vitals Were     Blood Pressure Temperature Respirations Pulse Oximetry          108/64 97  F (36.1  C) (Temporal) 16 98%        MyChart Information     E[employer] gives you secure access to your electronic health record. If you see a primary care provider, you can also send messages to your care team and make appointments. If you have questions, please call your primary care clinic.  If you do not have a primary care provider, please call [phone] and they will assist you.        Care EveryWhere ID     This is your Care EveryWhere ID. This could be used by other organizations to access your [facility] medical records  [id number]        Equal Access to Services     SOLIS SIERRA : Sara luna Soisela, waaxda lufloradaha, qaybta kaalnayla de los santos. So [facility] [phone].    ATENCIÓN: Si habla español, tiene a lange disposición servicios gratuitos de asistencia lingüística. Thalia al [phone].    We comply with applicable federal civil rights laws and [place] laws. We do not discriminate on the basis of race, color, national origin, age, disability sex, sexual orientation or gender identity.               Review of your medicines       START taking        Dose / Directions    HYDROcodone-acetaminophen 5-325 MG per tablet   Commonly known as:  NORCO   Used for:  Post-op pain        Dose:  1-2 tablet   Take 1-2 tablets by mouth every 4 hours as needed for other (Moderate to Severe Pain)   Quantity:  10 tablet   Refills:  0       ibuprofen 600 MG tablet   Commonly known as:  ADVIL/MOTRIN   Used for:  Post-op pain        Dose:  600 mg   Take 1 tablet (600 mg) by mouth every 6 hours as needed for pain (mild)   Quantity:  30 tablet   Refills:  0         CONTINUE these medicines which have NOT CHANGED        Dose / Directions    albuterol 108 (90 BASE) MCG/ACT Inhaler   Commonly known as:  PROAIR HFA/PROVENTIL HFA/VENTOLIN HFA   Used for:  Mild persistent asthma without complication        Dose:  2 puff   Inhale 2 puffs into the lungs every 4 hours as needed for shortness of breath / dyspnea   Quantity:  1 Inhaler   Refills:  6       calcitRIOL 0.25 MCG capsule   Commonly known as:  ROCALTROL   Used for:  S/P complete thyroidectomy, Hypocalcemia, Postoperative hypothyroidism        Take one capsule by mouth twice daily   Quantity:  180 capsule   Refills:  4       calcium 600 MG tablet        Dose:  2 tablet   Take 2 tablets by mouth 2 times daily   Refills:  0       CLARITIN 10 MG tablet   Generic drug:  loratadine        Dose:  10 mg   Take 10 mg by mouth daily   Refills:  0       FLONASE NA        Dose:  1 spray   Spray 1 spray in nostril daily   Refills:  0       fluticasone-salmeterol 250-50 MCG/DOSE diskus inhaler   Commonly known as:  ADVAIR DISKUS   Used for:  Mild persistent asthma without complication        INHALE 1 PUFF BY INHALATION ROUTE EVERY 12 HOURS   Quantity:  3 Inhaler   Refills:  2       levothyroxine 200 MCG tablet   Commonly known as:  SYNTHROID/LEVOTHROID   Used for:  Postoperative hypothyroidism, S/P complete thyroidectomy, Hypocalcemia        Dose:  200 mcg   Take 1 tablet (200 mcg) by mouth daily   Quantity:  90 tablet    Refills:  3       montelukast 10 MG tablet   Commonly known as:  SINGULAIR   Used for:  Mild persistent asthma without complication        TAKE ONE TABLET BY MOUTH EVERY NIGHT AT BEDTIME FOR ALLERGY AND ASTHMA CONTROL   Quantity:  90 tablet   Refills:  2       order for DME   Used for:  Plantar fasciitis        [id number] Plantar fasciits,ngt Splt,lg   Quantity:  1 each   Refills:  0            Where to get your medicines      Some of these will need a paper prescription and others can be bought over the counter. Ask your nurse if you have questions.     Bring a paper prescription for each of these medications     HYDROcodone-acetaminophen 5-325 MG per tablet    ibuprofen 600 MG tablet                Protect others around you: Learn how to safely use, store and throw away your medicines at www.disposemymeds.org.             Medication List: This is a list of all your medications and when to take them. Check marks below indicate your daily home schedule. Keep this list as a reference.      Medications           Morning Afternoon Evening Bedtime As Needed    albuterol 108 (90 BASE) MCG/ACT Inhaler   Commonly known as:  PROAIR HFA/PROVENTIL HFA/VENTOLIN HFA   Inhale 2 puffs into the lungs every 4 hours as needed for shortness of breath / dyspnea                                calcitRIOL 0.25 MCG capsule   Commonly known as:  ROCALTROL   Take one capsule by mouth twice daily                                calcium 600 MG tablet   Take 2 tablets by mouth 2 times daily                                CLARITIN 10 MG tablet   Take 10 mg by mouth daily   Generic drug:  loratadine                                FLONASE NA   Spray 1 spray in nostril daily                                fluticasone-salmeterol 250-50 MCG/DOSE diskus inhaler   Commonly known as:  ADVAIR DISKUS   INHALE 1 PUFF BY INHALATION ROUTE EVERY 12 HOURS                                HYDROcodone-acetaminophen 5-325 MG per tablet   Commonly known as:  NORCO    Take 1-2 tablets by mouth every 4 hours as needed for other (Moderate to Severe Pain)                                ibuprofen 600 MG tablet   Commonly known as:  ADVIL/MOTRIN   Take 1 tablet (600 mg) by mouth every 6 hours as needed for pain (mild)                                levothyroxine 200 MCG tablet   Commonly known as:  SYNTHROID/LEVOTHROID   Take 1 tablet (200 mcg) by mouth daily                                montelukast 10 MG tablet   Commonly known as:  SINGULAIR   TAKE ONE TABLET BY MOUTH EVERY NIGHT AT BEDTIME FOR ALLERGY AND ASTHMA CONTROL                                order for DME   [id number] Plantar fasciits,ngt Splt,lg

## 2017-07-13 NOTE — IP AVS SNAPSHOT
[facility]    [address] [place] [address]    Phone:  [phone]                                       After Visit Summary   7/13/2017    Kami Natarajan    MRN: [id number]           After Visit Summary Signature Page     I have received my discharge instructions, and my questions have been answered. I have discussed any challenges I see with this plan with the nurse or doctor.    ..........................................................................................................................................  Patient/Patient Representative Signature      ..........................................................................................................................................  Patient Representative Print Name and Relationship to Patient    ..................................................               ................................................  Date                                            Time    ..........................................................................................................................................  Reviewed by Signature/Title    ...................................................              ..............................................  Date                                                            Time

## 2017-07-13 NOTE — DISCHARGE INSTRUCTIONS
[facility]  Same-Day Surgery   Adult Discharge Orders & Instructions   For 24 hours after surgery  1. Get plenty of rest.  A responsible adult must stay with you for at least 24 hours after you leave the hospital.   2. Do not drive or use heavy equipment.  If you have weakness or tingling, don't drive or use heavy equipment until this feeling goes away.  3. Do not drink alcohol.  4. Avoid strenuous or risky activities.  Ask for help when climbing stairs.   5. You may feel lightheaded.  IF so, sit for a few minutes before standing.  Have someone help you get up.   6. If you have nausea (feel sick to your stomach): Drink only clear liquids such as apple juice, ginger ale, broth or 7-Up.  Rest may also help.  Be sure to drink enough fluids.  Move to a regular diet as you feel able.  7. You may have a slight fever. Call the doctor if your fever is over 100 F (37.7 C) (taken under the tongue) or lasts longer than 24 hours.  8. You may have a dry mouth, a sore throat, muscle aches or trouble sleeping.  These should go away after 24 hours.  9. Do not make important or legal decisions.   Call your doctor for any of the followin.  Signs of infection (fever, growing tenderness at the surgery site, a large amount of drainage or bleeding, severe pain, foul-smelling drainage, redness, swelling).    2. It has been over 8 to 10 hours since surgery and you are still not able to urinate (pass water).    3.  Headache for over 24 hours.    4.  Numbness, tingling or weakness the day after surgery (if you had spinal anesthesia).  [phone]

## 2017-07-13 NOTE — ANESTHESIA CARE TRANSFER NOTE
Patient: Kami Natarajan    Procedure(s):  Hysteroscopy, D&C, Mirena insertion - Wound Class: II-Clean Contaminated   - Wound Class: I-Clean    Diagnosis: metamenorrhagia, cervical stenosis  Diagnosis Additional Information: No value filed.    Anesthesia Type:   No value filed.     Note:    Patient transferred to:Phase II  Comments: To Phase II, awake, comfortable, sats 100%, Report to RN.      Vitals: (Last set prior to Anesthesia Care Transfer)    CRNA VITALS  7/13/2017 1353 - 7/13/2017 1427      7/13/2017             Pulse: 75    SpO2: 97 %                Electronically Signed By: FROY Hanley CRNA  July 13, 2017  2:27 PM

## 2017-07-13 NOTE — ANESTHESIA POSTPROCEDURE EVALUATION
Patient: Kami Natarajan    Procedure(s):  Hysteroscopy, D&C, Mirena insertion - Wound Class: II-Clean Contaminated   - Wound Class: I-Clean    Diagnosis:metamenorrhagia, cervical stenosis  Diagnosis Additional Information: No value filed.    Anesthesia Type:  MAC    Note:  Anesthesia Post Evaluation    Patient location during evaluation: PACU  Patient participation: Able to fully participate in evaluation  Level of consciousness: awake  Pain management: adequate  Airway patency: patent  Cardiovascular status: acceptable  Respiratory status: acceptable  Hydration status: acceptable  PONV: none     Anesthetic complications: None    Comments: Wise awake and alert.  Excellent recovery noted.        Last vitals:  Vitals:    07/13/17 1254 07/13/17 1426   BP: 117/71 108/64   Resp: 18 16   Temp: 98.3  F (36.8  C) 97  F (36.1  C)   SpO2: 98% 98%         Electronically Signed By: Lazarus Buckner MD  July 13, 2017  2:35 PM

## 2017-07-13 NOTE — OP NOTE
OPERATIVE REPORT:    Date of Procedure:  7/13/17    Preoperative Diagnosis:  Menorrhagia, cervical os stenosis    Postoperative Diagnosis:  Same    Procedures:  Hysteroscopy and D&C, Mirena IUD insertion    Surgeon:  Sierra Lane    Anesthesia:  MAC    Specimens:    None    Complications:   None    Findings/Conclusions:   Slightly proliferative endometrium, uterus sounded to 11cm    Estimated Blood Loss:  Minimal    Condition on discharge from OR:  Satisfactory    Procedure in Detail:  Proper consents were obtained.  The patient and I reviewed the risks, benefits, goals and limitations.  Her questions seemed to be answered.  After consenting to the procedure, the patient was taken to the OR where she was placed into the supine position.  She was then placed under MAC anesthesia after which she was placed into the dorsal lithotomy position.  She was then prepped and draped into the usual sterile fashion.  The bladder was drained with the in and out catheterization.  I then performed the exam under anesthesia.  The speculum was placed and the cervix was grasped with the allis tenaculum. The cervix was then dilated to 6 Linda.  The hysteroscope was inserted in through the cervix.  The scope was removed.  This was then followed by the sharp curettage of the uterus.  The curettage started at the 12 o'clock position and each pass was brought out, from each of the hours of the clock.  The cornua and the fundus were curetted.  The endometrial curettage was then sent to pathology.  The Mirena IUD was then inserted in the normal standard fashion without complication.  A ring forcep was used to grab a small piece of tissue out of the vagina however the Mirena string attached to the forcep so the Mirena inadvertently removed.  A new Mirena IUD was inserted.  The  vaginal instruments were removed and hemostasis was noted after application of Silver Nitrate.  The patient was then taken out of the dorsal lithotomy  position, awakened, and taken to the recovery room in stable condition.    No apparent complications.  Counts were correct.     Sierra Camacho=NDC: [id number]  Lot:  [id number]  Exp: 1/20

## 2017-07-13 NOTE — H&P (VIEW-ONLY)
[facility] [place]  [address]  [place] [address]  [phone]  Dept: [phone]    PRE-OP EVALUATION:  Today's date: 7/10/2017    Kami Natarajan (: 1970) presents for pre-operative evaluation assessment as requested by Dr. Lane.  She requires evaluation and anesthesia risk assessment prior to undergoing surgery/procedure for treatment of irregular menstrual bleeding.  Proposed procedure: Hysteroscopy, D&C, Mirena insertion    Date of Surgery/ Procedure: 2017  Time of Surgery/ Procedure: 1:30pm  [facility]  Fax number for surgical facility:   Primary Physician: Valerio Knox  Type of Anesthesia Anticipated: General    Patient has a Health Care Directive or Living Will:  NO    Preop Questions 7/10/2017   1.  Do you have a history of heart attack, stroke, stent, bypass or surgery on an artery in the head, neck, heart or legs? No   2.  Do you ever have any pain or discomfort in your chest? No   3.  Do you have a history of  Heart Failure? No   4.   Are you troubled by shortness of breath when:  walking on a level surface, or up a slight hill, or at night? YES - from asthma   5.  Do you currently have a cold, bronchitis or other respiratory infection? No   6.  Do you have a cough, shortness of breath, or wheezing? YES - from asthma   7.  Do you sometimes get pains in the calves of your legs when you walk? No   8. Do you or anyone in your family have previous history of blood clots? YES - mother in the past   9.  Do you or does anyone in your family have a serious bleeding problem such as prolonged bleeding following surgeries or cuts? No   10. Have you ever had problems with anemia or been told to take iron pills? No   11. Have you had any abnormal blood loss such as black, tarry or bloody stools, or abnormal vaginal bleeding? YES - currently being evaluated   12. Have you ever had a blood transfusion? No   13.  Have you or any of your relatives ever had problems with anesthesia? No   14. Do you have sleep apnea, excessive snoring or daytime drowsiness? No   15. Do you have any prosthetic heart valves? No   16. Do you have prosthetic joints? No   17. Is there any chance that you may be pregnant? No         HPI:                                                      Brief HPI related to upcoming procedure:     Kami Natarajan so 47-year-old patient seen for preoperative evaluation prior to undergoing hysteroscopy and D&C for ongoing problems with menorrhagia with some irregularity of periods. Endometrial biopsy was not satisfactory for diagnosis as an outpatient. She's had no pelvic pain. There is no pregnancy. Following evaluation and diagnosis, placement of a Mirena IUD is planned for control of endometrial proliferation and reduction of heavy menses.      See problem list for active medical problems.  Problems all longstanding and stable, except as noted/documented.  See ROS for pertinent symptoms related to these conditions.                                                                                                  .    MEDICAL HISTORY:                                                      Patient Active Problem List    Diagnosis Date Noted     Mild major depression (H) 07/31/2015     Priority: Medium     Metrorrhagia 02/14/2014     Priority: Medium     Anxiety 02/12/2014     Priority: Medium     Menstrual changes 02/12/2014     Priority: Medium     Postoperative hypothyroidism 11/13/2012     Priority: Medium     Postsurgical hypoparathyroidism (H) 11/13/2012     Priority: Medium     S/P complete thyroidectomy 09/25/2012     Priority: Medium     Hypocalcemia 09/25/2012     Priority: Medium     Papillary thyroid carcinoma (H) 08/08/2012     Priority: Medium     Thyroid cancer (H) 07/24/2012     Priority: Medium     Thyroid nodule 06/28/2012     Priority: Medium     Plantar fasciitis 05/10/2012     Priority: Medium     Hip  strain 05/10/2012     Priority: Medium     Multiple respiratory allergies 12/13/2011     Priority: Medium     Mild persistent asthma 10/21/2011     Priority: Medium     CARDIOVASCULAR SCREENING; LDL GOAL LESS THAN 160 10/21/2011     Priority: Medium     Hirsutism 10/21/2011     Priority: Medium     Alpha-1-antitrypsin deficiency carrier (H) 10/21/2011     Priority: Medium      Past Medical History:   Diagnosis Date     Alpha-1-antitrypsin deficiency (H)      Asthma     not hospitalized for this     Mild persistent asthma      Past Surgical History:   Procedure Laterality Date     HERNIA REPAIR, INGUINAL RT/LT  infant     THYROIDECTOMY  8/8/2012    Procedure: THYROIDECTOMY;  Total Thyroidectomy and Central Neck Dissection;  Surgeon: Philomena Ruiz MD;  Location: [facility] OR     Current Outpatient Prescriptions   Medication Sig Dispense Refill     levothyroxine (SYNTHROID, LEVOTHROID) 200 MCG tablet Take 1 tablet (200 mcg) by mouth daily 90 tablet 3     calcitRIOL (ROCALTROL) 0.25 MCG capsule Take one capsule by mouth twice daily 180 capsule 4     montelukast (SINGULAIR) 10 MG tablet TAKE ONE TABLET BY MOUTH EVERY NIGHT AT BEDTIME FOR ALLERGY AND ASTHMA CONTROL 90 tablet 2     fluticasone-salmeterol (ADVAIR DISKUS) 250-50 MCG/DOSE diskus inhaler INHALE 1 PUFF BY INHALATION ROUTE EVERY 12 HOURS 3 Inhaler 2     albuterol (PROAIR HFA, PROVENTIL HFA, VENTOLIN HFA) 108 (90 BASE) MCG/ACT inhaler Inhale 2 puffs into the lungs every 4 hours as needed for shortness of breath / dyspnea 1 Inhaler 6     Fluticasone Propionate (FLONASE NA)        loratadine (CLARITIN) 10 MG tablet Take 10 mg by mouth daily       calcium 600 MG tablet Take 2 tablets by mouth 2 times daily       ORDER FOR DME [id number]  Plantar fasciits,ngt  Splt,lg   1 each 0     OTC products: None, except as noted above    No Known Allergies   Latex Allergy: NO    Social History   Substance Use Topics     Smoking status: Former Smoker     Smokeless tobacco:  "Never Used     Alcohol use Yes      Comment: occasionally     History   Drug Use No       REVIEW OF SYSTEMS:                                                    C: NEGATIVE for fever, chills, change in weight  I: NEGATIVE for worrisome rashes, moles or lesions  E: NEGATIVE for vision changes or irritation  E/M: NEGATIVE for ear, mouth and throat problems  RESP: Well-controlled asthma with the use of Advair for long-term maintenance. Occasional use of albuterol is needed.  B: NEGATIVE for masses, tenderness or discharge  CV: NEGATIVE for chest pain, palpitations or peripheral edema  GI: NEGATIVE for nausea, abdominal pain, heartburn, or change in bowel habits  : NEGATIVE for frequency, dysuria, or hematuria   female: As above. Pelvic ultrasound showed normal ovaries. She has an intramural fibroid of moderate size. She has an echogenic endometrial focus of 6 mm with multiple possibilities on differential diagnosis.  M: NEGATIVE for significant arthralgias or myalgia  N: NEGATIVE for weakness, dizziness or paresthesias  E: NEGATIVE for temperature intolerance, skin/hair changes  ENDOCRINE: Is post thyroidectomy was postsurgical hypoparathyroidism. She is on supplemental therapy for thyroid replacement as well as calcium management.  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 This is all resolved and she is off medication. She has been off medication for more than one year and remained stable.    EXAM:                                                    /68  Pulse 68  Temp 97.7  F (36.5  C) (Temporal)  Resp 20  Ht 5' 6.25\" (1.683 m)  Wt 233 lb 6.4 oz (105.9 kg)  BMI 37.39 kg/m2    GENERAL APPEARANCE: healthy, alert and no distress     EYES: EOMI, PERRL     HENT: ear canals and TM's normal and nose and mouth without ulcers or lesions     NECK: no adenopathy, no asymmetry, " masses, or scars and thyroid normal to palpation     RESP: lungs clear to auscultation - no rales, rhonchi or wheezes     CV: regular rates and rhythm, normal S1 S2, no S3 or S4 and no murmur, click or rub     ABDOMEN:  soft, nontender, no HSM or masses and bowel sounds normal     GU_female: Gynecologic exam deferred.     MS: extremities normal- no gross deformities noted, no evidence of inflammation in joints, FROM in all extremities.     SKIN: no suspicious lesions or rashes     NEURO: Normal strength and tone, sensory exam grossly normal, mentation intact and speech normal     PSYCH: mentation appears normal. and affect normal/bright     LYMPHATICS: No axillary, cervical, or supraclavicular nodes    DIAGNOSTICS:                                                      Labs Resulted Today:   Results for orders placed or performed in visit on 07/10/17   CBC with platelets   Result Value Ref Range    WBC 8.1 4.0 - 11.0 10e9/L    RBC Count 4.05 3.8 - 5.2 10e12/L    Hemoglobin 11.9 11.7 - 15.7 g/dL    Hematocrit 35.5 35.0 - 47.0 %    MCV 88 78 - 100 fl    MCH 29.4 26.5 - 33.0 pg    MCHC 33.5 31.5 - 36.5 g/dL    RDW 12.7 10.0 - 15.0 %    Platelet Count 238 150 - 450 10e9/L   Basic metabolic panel   Result Value Ref Range    Sodium 141 133 - 144 mmol/L    Potassium 3.7 3.4 - 5.3 mmol/L    Chloride 106 94 - 109 mmol/L    Carbon Dioxide 26 20 - 32 mmol/L    Anion Gap 9 3 - 14 mmol/L    Glucose 71 70 - 99 mg/dL    Urea Nitrogen 11 7 - 30 mg/dL    Creatinine 1.03 0.52 - 1.04 mg/dL    GFR Estimate 57 (L) >60 mL/min/1.7m2    GFR Estimate If Black 69 >60 mL/min/1.7m2    Calcium 9.1 8.5 - 10.1 mg/dL       Recent Labs   Lab Test  04/14/17   1034  04/29/16   1056   11/05/14   1300   HGB  12.6  12.3   < >   --    PLT  302  267   < >   --    NA  141  138   < >   --    POTASSIUM  4.1  3.7   < >   --    CR  0.94  0.91   < >   --    A1C   --    --    --   5.3    < > = values in this interval not displayed.        IMPRESSION:                                                     Reason for surgery/procedure:     Irregular menstrual bleeding, menorrhagia //  Hysteroscopy, dilatation and curettage of the uterus and placement of a Mirena IUD.    Diagnosis/reason for consult:     (Z01.818) Preop general physical exam  (primary encounter diagnosis)  Comment:  Preoperative evaluation completed and findings as noted.  Plan: Cleared for proposed surgery.    (N93.9) Abnormal vaginal bleeding  Comment:  As noted above with a intramural fibroid and some suggestion of endometrial abnormality.  Plan: CBC with platelets            (E89.2) Postsurgical hypoparathyroidism (H)  Comment: Calcium normal.  Plan: Basic metabolic panel            (E89.0) Postoperative hypothyroidism  Comment: TSH has been stable and not rechecked.  Plan:     (J45.30) Mild persistent asthma without complication  Comment: Stable on current maintenance therapy.  Plan:         The proposed surgical procedure is considered INTERMEDIATE risk.    REVISED CARDIAC RISK INDEX  The patient has the following serious cardiovascular risks for perioperative complications such as (MI, PE, VFib and 3  AV Block):  No serious cardiac risks  INTERPRETATION: 0 risks: Class I (very low risk - 0.4% complication rate)    The patient has the following additional risks for perioperative complications:  No identified additional risks        RECOMMENDATIONS:                                                      --Consult hospital rounder / IM to assist post-op medical management    --Patient is to take all scheduled medications on the day of surgery EXCEPT for modifications listed below.    APPROVAL GIVEN to proceed with proposed procedure, without further diagnostic evaluation       Signed Electronically by: Valerio Knox MD    Copy of this evaluation report is provided to requesting physician.    [facility] Preop Guidelines

## 2017-07-14 DIAGNOSIS — N92.0 EXCESSIVE OR FREQUENT MENSTRUATION: Primary | ICD-10-CM

## 2017-07-15 DIAGNOSIS — J45.30 MILD PERSISTENT ASTHMA WITHOUT COMPLICATION: ICD-10-CM

## 2017-07-17 LAB — COPATH REPORT: NORMAL

## 2017-07-17 NOTE — TELEPHONE ENCOUNTER
albuterol (PROAIR HFA, PROVENTIL HFA, VENTOLIN HFA) 108 (90 BASE) MCG/ACT inhaler      Last Written Prescription Date: 10/20/16  Last Fill Quantity: 1 INHALER, # refills: 6Last Office Visit with G, P or [facility] prescribing provider: 7/10/17 DHAVAL  Next 5 appointments (look out 90 days)     Jul 28, 2017 10:00 AM CDT   SHORT with Sierra Lane DO   [facility] [place] ([facility])    [address]  [address]  [place] [facility] [place] [address]   [phone]                   Potassium   Date Value Ref Range Status   07/10/2017 3.7 3.4 - 5.3 mmol/L Final     Creatinine   Date Value Ref Range Status   07/10/2017 1.03 0.52 - 1.04 mg/dL Final     BP Readings from Last 3 Encounters:   07/13/17 117/61   07/10/17 108/68   05/23/17 106/71

## 2017-07-18 ENCOUNTER — TELEPHONE (OUTPATIENT)
Dept: OBGYN | Facility: OTHER | Age: 47
End: 2017-07-18

## 2017-07-18 NOTE — TELEPHONE ENCOUNTER
Notes Recorded by Sierra Lane DO on 7/17/2017 at 5:27 PM  Please call patient with her normal pathology results from surgery.  Let her know that there are no signs of cancer. Thanks!    ~Sierra Lane DO

## 2017-07-19 NOTE — TELEPHONE ENCOUNTER
Prescription approved per [facility] [place].  Carmelo Ames, RN, BSN

## 2017-07-24 DIAGNOSIS — J45.30 MILD PERSISTENT ASTHMA WITHOUT COMPLICATION: ICD-10-CM

## 2017-07-24 NOTE — TELEPHONE ENCOUNTER
singulair      Last Written Prescription Date: 10/20/16  Last Fill Quantity: 90,  # refills: 2   Last Office Visit with [facility], [facility] or [facility] prescribing provider: 07/10/17                                         Next 5 appointments (look out 90 days)     Jul 28, 2017 10:00 AM CDT   SHORT with Sierra Lane DO   [facility] [place] ([facility] [place])    [address]  [address]  [place] [address] [phone]

## 2017-07-25 RX ORDER — MONTELUKAST SODIUM 10 MG/1
TABLET ORAL
Qty: 30 TABLET | Refills: 4 | Status: SHIPPED | OUTPATIENT
Start: 2017-07-25 | End: 2017-11-01

## 2017-07-25 NOTE — TELEPHONE ENCOUNTER
Pres[place] approved per [facility] [place].  Maryana Piper, RN, BSN

## 2017-07-27 ENCOUNTER — TRANSFERRED RECORDS (OUTPATIENT)
Dept: HEALTH INFORMATION MANAGEMENT | Facility: CLINIC | Age: 47
End: 2017-07-27

## 2017-07-28 ENCOUNTER — OFFICE VISIT (OUTPATIENT)
Dept: OBGYN | Facility: CLINIC | Age: 47
End: 2017-07-28
Payer: COMMERCIAL

## 2017-07-28 VITALS
HEART RATE: 72 BPM | BODY MASS INDEX: 36.67 KG/M2 | DIASTOLIC BLOOD PRESSURE: 60 MMHG | WEIGHT: 228.9 LBS | SYSTOLIC BLOOD PRESSURE: 110 MMHG

## 2017-07-28 DIAGNOSIS — Z30.431 IUD CHECK UP: Primary | ICD-10-CM

## 2017-07-28 PROBLEM — Z97.5 IUD (INTRAUTERINE DEVICE) IN PLACE: Status: ACTIVE | Noted: 2017-07-28

## 2017-07-28 PROCEDURE — 99213 OFFICE O/P EST LOW 20 MIN: CPT | Performed by: OBSTETRICS & GYNECOLOGY

## 2017-07-28 NOTE — PROGRESS NOTES
Subjective  47 year old non-pregnant female presents today as a post-op from a HD&C, Mirena placement on 7/13/17 for menorrhagia.  Patient states she is doing well. No pain.  Minimal vaginal bleeding.  No problems urinating.  Normal bowel movements.  No fever or chills.  Nothing per vagina.  Pictures and pathology reviewed from surgery and all questions answered.        ROS: 10 point ROS neg other than the symptoms noted above in the HPI.  Past Medical History:   Diagnosis Date     Alpha-1-antitrypsin deficiency (H)      Asthma     not hospitalized for this     Mild persistent asthma      Past Surgical History:   Procedure Laterality Date     DILATION AND CURETTAGE, HYSTEROSCOPY DIAGNOSTIC, COMBINED N/A 07/13/2017     DILATION AND CURETTAGE, OPERATIVE HYSTEROSCOPY, COMBINED N/A 7/13/2017    Procedure: COMBINED DILATION AND CURETTAGE, OPERATIVE HYSTEROSCOPY;  Hysteroscopy, D&C, Mirena insertion;  Surgeon: Sierra Lane DO;  Location: [facility] OR     HERNIA REPAIR, INGUINAL RT/LT  infant     INSERT INTRAUTERINE DEVICE N/A 7/13/2017    Procedure: INSERT INTRAUTERINE DEVICE;;  Surgeon: Sierra Lane DO;  Location: [facility] OR     THYROIDECTOMY  8/8/2012    Procedure: THYROIDECTOMY;  Total Thyroidectomy and Central Neck Dissection;  Surgeon: Philomena Ruiz MD;  Location: [facility] OR     Family History   Problem Relation Age of Onset     Asthma Father      CANCER Father      cirrhosis and liver cancer     Genetic Disorder Father      alpha 1 antitrypsin deficiency     Thyroid Disease Mother      also on blood thinner. unclear history     Social History   Substance Use Topics     Smoking status: Former Smoker     Smokeless tobacco: Never Used     Alcohol use Yes      Comment: occasionally         Objective  Vitals: /60 (BP Location: Left arm, Patient Position: Chair, Cuff Size: Adult Regular)  Pulse 72  Wt 103.8 kg (228 lb 14.4 oz)  BMI 36.67 kg/m2  BMI= Body mass index is 36.67 kg/(m^2).      PELVIC:     External genitalia: normal without lesion  Vagina: normal mucosa and rugae, no discharge.  Cervix: normal without lesion, friable, IUD strings visible 3cm from os  Uterus: small, mobile, nontender.  Adnexa: non tender, without masses  Rectal: deffered      Assessment  1.)  S/p HD&C, Mirena placement on 7/13/17      Plan  1.)  Follow up as needed       Sierra Lane

## 2017-07-28 NOTE — NURSING NOTE
"Chief Complaint   Patient presents with     Surgical Followup     D  and   C  and Hysteroscopy on 7/13       Initial /60 (BP Location: Left arm, Patient Position: Chair, Cuff Size: Adult Regular)  Pulse 72  Wt 103.8 kg (228 lb 14.4 oz)  BMI 36.67 kg/m2 Estimated body mass index is 36.67 kg/(m^2) as calculated from the following:    Height as of 7/10/17: 1.683 m (5' 6.25\").    Weight as of this encounter: 103.8 kg (228 lb 14.4 oz).  BP completed using cuff size: regular    No obstetric history on file.    The following HM Due: NONE      The following patient reported/Care Every where data was sent to:  P ABSTRACT QUALITY INITIATIVES [[id number]]  n/a     n/a     Patricia Herrera CMA  July 28, 2017      "

## 2017-07-28 NOTE — MR AVS SNAPSHOT
After Visit Summary   7/28/2017    Kami Natarajan    MRN: [id number]           Patient Information     Date Of Birth          1970        Visit Information        Provider Department      7/28/2017 10:00 AM Sierra Lane DO [facility]        Today's Diagnoses     IUD check up    -  1      Care Instructions    Please call if you any questions.    [facility]  [address] [facility]  [place]   [address]  [phone]        [address]          Follow-ups after your visit        Follow-up notes from your care team     Return if symptoms worsen or fail to improve.      Your next 10 appointments already scheduled     Oct 16, 2017  4:50 PM CDT   LAB with LAB FIRST FLOOR [facility] ([facility])    [address] [place] [id number] [phone]           Patient must bring picture ID. Patient should be prepared to give a urine specimen  Please do not eat 10-12 hours before your appointment if you are coming in fasting for labs on lipids, cholesterol, or glucose (sugar). Pregnant women should follow their Care Team instructions. Water with medications is okay. Do not drink coffee or other fluids. If you have concerns about taking  your medications, please ask at office or if scheduling via [employer], send a message by clicking on Secure Messaging, Message Your Care Team.            Oct 23, 2017  8:00 AM CDT   Return Visit with Anju Jacobs MD   [facility])    [address] [place] [id number] [phone]              Who to contact     If you have questions or need follow up information about today's clinic visit or your schedule please contact [place] [facility] directly at [phone].  Normal or non-critical lab and imaging results will be communicated to you by MyChart, letter or phone within 4 business days after the clinic has received the  results. If you do not hear from us within 7 days, please contact the clinic through [email or web address] or phone. If you have a critical or abnormal lab result, we will notify you by phone as soon as possible.  Submit refill requests through [email or web address] or call your pharmacy and they will forward the refill request to us. Please allow 3 business days for your refill to be completed.          Additional Information About Your Visit        [email or web address] Information     [email or web address] gives you secure access to your electronic health record. If you see a primary care provider, you can also send messages to your care team and make appointments. If you have questions, please call your primary care clinic.  If you do not have a primary care provider, please call [phone] and they will assist you.        Care EveryWhere ID     This is your Care EveryWhere ID. This could be used by other organizations to access your [place] medical records  [id number]        Your Vitals Were     Pulse BMI (Body Mass Index)                72 36.67 kg/m2           Blood Pressure from Last 3 Encounters:   07/28/17 110/60   07/13/17 117/61   07/10/17 108/68    Weight from Last 3 Encounters:   07/28/17 103.8 kg (228 lb 14.4 oz)   07/10/17 105.9 kg (233 lb 6.4 oz)   05/23/17 107.5 kg (237 lb)              Today, you had the following     No orders found for display       Primary Care Provider Office Phone # Fax #    Valeriochris Knox [phone]       [facility] [address] [place] [address]        Equal Access to Services     SOLIS SIERRA : Hadii aad ku hadasho Soomaali, waaxda luqadaha, qaybta kaalmada adeegyada, nayla ramos. So [facility] [phone].    ATENCIÓN: Si habla español, tiene a lange disposición servicios gratuitos de asistencia lingüística. Llame al [phone].    We comply with applicable federal civil rights laws and [place] laws. We do not discriminate on the basis of race, color,  national origin, age, disability sex, sexual orientation or gender identity.            Thank you!     Thank you for choosing [facility]  for your care. Our goal is always to provide you with excellent care. Hearing back from our patients is one way we can continue to improve our services. Please take a few minutes to complete the written survey that you may receive in the mail after your visit with us. Thank you!             Your Updated Medication List - Protect others around you: Learn how to safely use, store and throw away your medicines at www.disposemymeds.org.          This list is accurate as of: 7/28/17 10:15 AM.  Always use your most recent med list.                   Brand Name Dispense Instructions for use Diagnosis    ADVAIR DISKUS 250-50 MCG/DOSE diskus inhaler   Generic drug:  fluticasone-salmeterol     60 Inhaler    INHALE ONE PUFF BY MOUTH TWICE DAILY    Mild persistent asthma without complication       albuterol 108 (90 BASE) MCG/ACT Inhaler    PROAIR HFA/PROVENTIL HFA/VENTOLIN HFA    1 Inhaler    Inhale 2 puffs into the lungs every 4 hours as needed for shortness of breath / dyspnea    Mild persistent asthma without complication       calcitRIOL 0.25 MCG capsule    ROCALTROL    180 capsule    Take one capsule by mouth twice daily    S/P complete thyroidectomy, Hypocalcemia, Postoperative hypothyroidism       calcium 600 MG tablet      Take 2 tablets by mouth 2 times daily        CLARITIN 10 MG tablet   Generic drug:  loratadine      Take 10 mg by mouth daily        FLONASE NA      Spray 1 spray in nostril daily        HYDROcodone-acetaminophen 5-325 MG per tablet    NORCO    10 tablet    Take 1-2 tablets by mouth every 4 hours as needed for other (Moderate to Severe Pain)    Post-op pain       ibuprofen 600 MG tablet    ADVIL/MOTRIN    30 tablet    Take 1 tablet (600 mg) by mouth every 6 hours as needed for pain (mild)    Post-op pain       levothyroxine 200 MCG tablet     SYNTHROID/LEVOTHROID    90 tablet    Take 1 tablet (200 mcg) by mouth daily    Postoperative hypothyroidism, S/P complete thyroidectomy, Hypocalcemia       montelukast 10 MG tablet    SINGULAIR    30 tablet    TAKE ONE TABLET BY MOUTH AT BEDTIME    Mild persistent asthma without complication       order for DME     1 each    [id number] Plantar fasciits,ngt Splt,lg    Plantar fasciitis

## 2017-07-28 NOTE — PATIENT INSTRUCTIONS
Please call if you any questions.    [place] [facility]  [address] [facility]  [place]   [address]  [phone]        [address]

## 2017-09-30 DIAGNOSIS — J45.30 MILD PERSISTENT ASTHMA WITHOUT COMPLICATION: ICD-10-CM

## 2017-10-02 NOTE — TELEPHONE ENCOUNTER
ADVAIR DISKUS 250-50 MCG/DOSE diskus inhaler       Last Written Prescription Date: 7/19/17  Last Fill Quantity: 60 inhaler, # refills: 1    Last Office Visit with ANN-MARIE, MANNY or [facility] prescribing provider:  7/10/17   Future Office Visit:    Next 5 appointments (look out 90 days)     Oct 23, 2017  8:00 AM CDT   Return Visit with Anju Jacobs MD   [facility] ([facility])    [address] [place] [id number] [phone]                   Date of Last Asthma Action Plan Letter:   Asthma Action Plan Q1 Year    Topic Date Due     Asthma Action Plan - yearly  04/14/2018      Asthma Control Test:   ACT Total Scores 7/10/2017   ACT TOTAL SCORE -   ASTHMA ER VISITS -   ASTHMA HOSPITALIZATIONS -   ACT TOTAL SCORE (Goal Greater than or Equal to 20) 22   In the past 12 months, how many times did you visit the emergency room for your asthma without being admitted to the hospital? 0   In the past 12 months, how many times were you hospitalized overnight because of your asthma? 0       Date of Last Spirometry Test:   No results found for this or any previous visit.

## 2017-10-02 NOTE — TELEPHONE ENCOUNTER
Prescription approved per [facility]ill Protocol.  Frances Melendez, RN, BSN

## 2017-11-01 ENCOUNTER — OFFICE VISIT (OUTPATIENT)
Dept: FAMILY MEDICINE | Facility: CLINIC | Age: 47
End: 2017-11-01
Payer: COMMERCIAL

## 2017-11-01 VITALS
OXYGEN SATURATION: 95 % | TEMPERATURE: 98.4 F | WEIGHT: 232 LBS | SYSTOLIC BLOOD PRESSURE: 110 MMHG | DIASTOLIC BLOOD PRESSURE: 72 MMHG | BODY MASS INDEX: 37.16 KG/M2

## 2017-11-01 DIAGNOSIS — E89.0 POSTOPERATIVE HYPOTHYROIDISM: ICD-10-CM

## 2017-11-01 DIAGNOSIS — J30.9 MULTIPLE RESPIRATORY ALLERGIES: ICD-10-CM

## 2017-11-01 DIAGNOSIS — J01.11 ACUTE RECURRENT FRONTAL SINUSITIS: Primary | ICD-10-CM

## 2017-11-01 DIAGNOSIS — J45.30 MILD PERSISTENT ASTHMA WITHOUT COMPLICATION: ICD-10-CM

## 2017-11-01 PROCEDURE — 99214 OFFICE O/P EST MOD 30 MIN: CPT | Performed by: FAMILY MEDICINE

## 2017-11-01 RX ORDER — AZITHROMYCIN 250 MG/1
TABLET, FILM COATED ORAL
Qty: 6 TABLET | Refills: 0 | Status: SHIPPED | OUTPATIENT
Start: 2017-11-01 | End: 2018-01-22

## 2017-11-01 RX ORDER — MONTELUKAST SODIUM 10 MG/1
1 TABLET ORAL DAILY
Qty: 90 TABLET | Refills: 3 | Status: SHIPPED | OUTPATIENT
Start: 2017-11-01 | End: 2018-11-15

## 2017-11-01 ASSESSMENT — ENCOUNTER SYMPTOMS
COUGH: 1
SORE THROAT: 1
SWEATS: 1
HOARSE VOICE: 1
SINUS PRESSURE: 1

## 2017-11-01 ASSESSMENT — PAIN SCALES - GENERAL: PAINLEVEL: MILD PAIN (3)

## 2017-11-01 NOTE — MR AVS SNAPSHOT
After Visit Summary   11/1/2017    Kami Natarajan    MRN: [id number]           Patient Information     Date Of Birth          1970        Visit Information        Provider Department      11/1/2017 1:20 PM Valerio Knox MD [facility]        Today's Diagnoses     Acute recurrent frontal sinusitis    -  1    Mild persistent asthma without complication        Multiple respiratory allergies        Postoperative hypothyroidism          Care Instructions    These are new changes to your current plan of care based on today's visit:    Medications stopped    Medications to be started Zithromax 250 mg    Change dose of this medication    New treatments        Follow up appointments:    1.  FOLLOW UP WITH YOUR PRIMARY CARE PROVIDER: 6 month(s) for general physical without GYN care with fasting blood work     2. FOLLOW UP WITH SPECIALIST: As planned    Valerio Knox MD                Follow-ups after your visit        Follow-up notes from your care team     Return in about 6 months (around 5/1/2018) for Routine Visit, Lab Work.      Your next 10 appointments already scheduled     Francis 15, 2018  1:00 PM CST   LAB with LAB FIRST FLOOR [facility] ([facility])    [address] [place] [id number] [phone]           Please do not eat 10-12 hours before your appointment if you are coming in fasting for labs on lipids, cholesterol, or glucose (sugar). This does not apply to pregnant women. Water, hot tea and black coffee (with nothing added) are okay. Do not drink other fluids, diet soda or chew gum.            Jan 22, 2018  9:30 AM CST   Return Visit with Anju Jacobs MD   [facility] ([facility])    [address] [place] [address] [phone]              Who to contact     If you have questions or need follow up information about today's clinic visit or your schedule please  contact [facility] [place] directly at [phone].  Normal or non-critical lab and imaging results will be communicated to you by MyChart, letter or phone within 4 business days after the clinic has received the results. If you do not hear from us within 7 days, please contact the clinic through [email or web address] or phone. If you have a critical or abnormal lab result, we will notify you by phone as soon as possible.  Submit refill requests through [employer] or call your pharmacy and they will forward the refill request to us. Please allow 3 business days for your refill to be completed.          Additional Information About Your Visit        [employer] Information     [employer] gives you secure access to your electronic health record. If you see a primary care provider, you can also send messages to your care team and make appointments. If you have questions, please call your primary care clinic.  If you do not have a primary care provider, please call [phone] and they will assist you.        Care EveryWhere ID     This is your Care EveryWhere ID. This could be used by other organizations to access your [place] medical records  [id number]        Your Vitals Were     Temperature Pulse Oximetry BMI (Body Mass Index)             98.4  F (36.9  C) (Temporal) 95% 37.16 kg/m2          Blood Pressure from Last 3 Encounters:   11/01/17 110/72   07/28/17 110/60   07/13/17 117/61    Weight from Last 3 Encounters:   11/01/17 232 lb (105.2 kg)   07/28/17 228 lb 14.4 oz (103.8 kg)   07/10/17 233 lb 6.4 oz (105.9 kg)              Today, you had the following     No orders found for display         Today's Medication Changes          These changes are accurate as of: 11/1/17  1:46 PM.  If you have any questions, ask your nurse or doctor.               Start taking these medicines.        Dose/Directions    azithromycin 250 MG tablet   Commonly known as:  ZITHROMAX   Used for:  Acute recurrent frontal sinusitis   Started by:   Valerio Knox MD        Two tablets first day, then one tablet daily for four days.   Quantity:  6 tablet   Refills:  0         These medicines have changed or have updated prescriptions.        Dose/Directions    montelukast 10 MG tablet   Commonly known as:  SINGULAIR   This may have changed:  See the new instructions.   Used for:  Mild persistent asthma without complication   Changed by:  Valerio Knox MD        Dose:  1 tablet   Take 1 tablet (10 mg) by mouth daily   Quantity:  90 tablet   Refills:  3            Where to get your medicines      These medications were sent to [id number] - [place] - [address] [place] [address]  [address] [place] [address] [address]     Phone:  [phone]     azithromycin 250 MG tablet    montelukast 10 MG tablet                Primary Care Provider Office Phone # Fax #    Valerio Knox [phone] [address] [facility]  [place] [address]        Equal Access to Services     [facility] AH: Hadii aad ku hadasho Soomaali, waaxda luqadaha, qaybta kaalmada adeegyada, waxay idiin hayrichardn noel orlandoaraamirah richardson . So [facility] [phone].    ATENCIÓN: Si rainerla español, tiene a lange disposición servicios gratuitos de asistencia lingüística. Llame al [phone].    We comply with applicable federal civil rights laws and [place] laws. We do not discriminate on the basis of race, color, national origin, age, disability, sex, sexual orientation, or gender identity.            Thank you!     [facility]. Our goal is always to provide you with excellent care. Hearing back from our patients is one way we can continue to improve our services. Please take a few minutes to complete the written survey that you may receive in the mail after your visit with us. Thank you!             Your Updated Medication List - Protect others around you: Learn how to safely use, store and throw away your medicines at  www.disposemymeds.org.          This list is accurate as of: 11/1/17  1:46 PM.  Always use your most recent med list.                   Brand Name Dispense Instructions for use Diagnosis    ADVAIR DISKUS 250-50 MCG/DOSE diskus inhaler   Generic drug:  fluticasone-salmeterol     1 Inhaler    INHALE ONE PUFF BY MOUTH TWICE DAILY    Mild persistent asthma without complication       albuterol 108 (90 BASE) MCG/ACT Inhaler    PROAIR HFA/PROVENTIL HFA/VENTOLIN HFA    1 Inhaler    Inhale 2 puffs into the lungs every 4 hours as needed for shortness of breath / dyspnea    Mild persistent asthma without complication       azithromycin 250 MG tablet    ZITHROMAX    6 tablet    Two tablets first day, then one tablet daily for four days.    Acute recurrent frontal sinusitis       calcitRIOL 0.25 MCG capsule    ROCALTROL    180 capsule    Take one capsule by mouth twice daily    S/P complete thyroidectomy, Hypocalcemia, Postoperative hypothyroidism       calcium 600 MG tablet      Take 2 tablets by mouth 2 times daily        CLARITIN 10 MG tablet   Generic drug:  loratadine      Take 10 mg by mouth daily        FLONASE NA      Spray 1 spray in nostril daily        levothyroxine 200 MCG tablet    SYNTHROID/LEVOTHROID    90 tablet    Take 1 tablet (200 mcg) by mouth daily    Postoperative hypothyroidism, S/P complete thyroidectomy, Hypocalcemia       montelukast 10 MG tablet    SINGULAIR    90 tablet    Take 1 tablet (10 mg) by mouth daily    Mild persistent asthma without complication       order for DME     1 each    [id number] Plantar fasciits,ngt Splt,lg    Plantar fasciitis

## 2017-11-01 NOTE — PATIENT INSTRUCTIONS
These are new changes to your current plan of care based on today's visit:    Medications stopped    Medications to be started Zithromax 250 mg    Change dose of this medication    New treatments        Follow up appointments:    1.  FOLLOW UP WITH YOUR PRIMARY CARE PROVIDER: 6 month(s) for general physical without GYN care with fasting blood work     2. FOLLOW UP WITH SPECIALIST: As planned    Valreio Knox MD

## 2017-11-01 NOTE — NURSING NOTE
"Chief Complaint   Patient presents with     Asthma     Sinus Problem       Initial /72  Temp 98.4  F (36.9  C) (Temporal)  Wt 232 lb (105.2 kg)  LMP   SpO2 95%  BMI 37.16 kg/m2 Estimated body mass index is 37.16 kg/(m^2) as calculated from the following:    Height as of 7/10/17: 5' 6.25\" (1.683 m).    Weight as of this encounter: 232 lb (105.2 kg).  Medication Reconciliation: complete     Arash Neely MA    "

## 2017-11-01 NOTE — PROGRESS NOTES
SUBJECTIVE:                                                    Kami Natarajan is a 47 year old female who presents to clinic today for the following health issues:    Patient would like future refills --overall doing well with Advair Singulair and albuterol as needed. Up until recent upper respiratory infection, as well as been well controlled with shortness of breath or without need for a rescue inhaler.    Asthma     Asthma:     Cough::  YES    Wheezing::  No    Dyspnea::  No    Use of rescue inhaler:: once a month     Taking Asthma medication as prescribed::  Yes    Asthma triggers::  Cold air and Pollens    ER/UC Visits or Admissions::  None  Sinus Problem    This is a new (3 weeks ago, phlegm cough, sinus headache,) problem. The current episode started more than 1 week ago. The problem has been gradually worsening. There has been no fever. Associated symptoms include sweats, congestion, hoarse voice, sinus pressure, sore throat and cough. She has tried other medications (essential oil, vicks, nyquil, cough syrup) for the symptoms.     Patient's had some upper respiratory congestion which appeared to be improving until the last 7 days. Since then she's had purulent drainage and thick mucus in the lower respiratory tract. No facial pain and no fever noted.    Hypothyroidism Follow-up--follows with her endocrinologist for postoperative hypothyroidism. Also has need for calcium replacement dated hypoparathyroidism.      Since last visit, patient describes the following symptoms: Weight stable, no hair loss, no skin changes, no constipation, no loose stools        Problem list and histories reviewed & adjusted, as indicated.  Additional history: as documented            ROS:  C: NEGATIVE for fever, chills, change in weight  I: NEGATIVE for worrisome rashes, moles or lesions  E: NEGATIVE for vision changes or irritation  ENT/MOUTH: Symptoms as noted above related to the sinus infection.  RESP:as above  CV: NEGATIVE  for chest pain, palpitations or peripheral edema  GI: NEGATIVE for nausea, abdominal pain, heartburn, or change in bowel habits  : NEGATIVE for frequency, dysuria, or hematuria  M: NEGATIVE for significant arthralgias or myalgia  N: NEGATIVE for weakness, dizziness or paresthesias  E: NEGATIVE for temperature intolerance, skin/hair changes  ENDOCRINE: Stable on her thyroid replacement therapy.  H: NEGATIVE for bleeding problems  P: NEGATIVE for changes in mood or affect. Has not had to return to Effexor for anxiety and depression. Doing well overall.    OBJECTIVE:                                                    /72  Temp 98.4  F (36.9  C) (Temporal)  Wt 232 lb (105.2 kg)  LMP   SpO2 95%  BMI 37.16 kg/m2  Body mass index is 37.16 kg/(m^2).  GENERAL: healthy, alert and no distress  GENERAL: cooperative and over weight  EYES: Eyes grossly normal to inspection, extraocular movements - intact, and PERRL  HENT: ear canals- normal; TMs- normal; Nose- normal; Mouth- no ulcers, no lesions  HENT: No sinus tenderness noted.  NECK: no tenderness, no adenopathy, no asymmetry, no masses, no stiffness; thyroid- normal to palpation  RESP: lungs clear to auscultation - no rales, no rhonchi, no wheezes  CV: regular rates and rhythm, normal S1 S2, no S3 or S4 and no murmur, no click or rub -  ABDOMEN: soft, no tenderness, no  hepatosplenomegaly, no masses, normal bowel sounds  MS: extremities- no gross deformities noted, no edema  SKIN: no suspicious lesions, no rashes  NEURO: strength and tone- normal, sensory exam- grossly normal, mentation- intact, speech- normal, reflexes- symmetric  PSYCH: Alert and oriented times 3; speech- coherent , normal rate and volume; able to articulate logical thoughts, able to abstract reason, no tangential thoughts, no hallucinations or delusions, affect- normal    Diagnostic test results:  Diagnostic Test Results:  none        ASSESSMENT/PLAN:                                                     1. Acute recurrent frontal sinusitis   With the change in character of mucus, begin Azithromycin and follow-up if needed. No steroids.  - azithromycin (ZITHROMAX) 250 MG tablet; Two tablets first day, then one tablet daily for four days.  Dispense: 6 tablet; Refill: 0    2. Mild persistent asthma without complication  Stable with Advair and Singular. Albuterol if needed during her respiratory infection. No steroids appear indicated.  - montelukast (SINGULAIR) 10 MG tablet; Take 1 tablet (10 mg) by mouth daily  Dispense: 90 tablet; Refill: 3    3. Multiple respiratory allergies  Renew Singulair with good control. Also continue Flonase and Claritin as needed.    4. Postoperative hypothyroidism  Follow-ups are endocrinologist, currently on levothyroxine along with hypoparathyroid therapy.      Follow up with Provider - Follow-up in 6 months or as needed.   See Patient Instructions    The patient understood the rational for the diagnosis and treatment plan. All questions were answered to best of my ability and the patient's satisfaction.    Note: Chart documentation done in part with Dragon Voice Recognition software. Although reviewed after completion, some word and grammatical errors may remain.  Please consider this when interpreting information in this chart.      Valerio Knox MD  [facility]

## 2017-11-02 ASSESSMENT — ASTHMA QUESTIONNAIRES: ACT_TOTALSCORE: 21

## 2017-11-03 DIAGNOSIS — E89.0 POSTOPERATIVE HYPOTHYROIDISM: ICD-10-CM

## 2017-11-03 DIAGNOSIS — E83.51 HYPOCALCEMIA: ICD-10-CM

## 2017-11-03 DIAGNOSIS — E89.0 S/P COMPLETE THYROIDECTOMY: ICD-10-CM

## 2017-11-03 RX ORDER — LEVOTHYROXINE SODIUM 200 UG/1
200 TABLET ORAL DAILY
Qty: 90 TABLET | Refills: 0 | Status: SHIPPED | OUTPATIENT
Start: 2017-11-03 | End: 2018-01-22

## 2017-12-01 DIAGNOSIS — E89.0 POSTOPERATIVE HYPOTHYROIDISM: ICD-10-CM

## 2017-12-01 DIAGNOSIS — E89.0 S/P COMPLETE THYROIDECTOMY: ICD-10-CM

## 2017-12-01 DIAGNOSIS — E83.51 HYPOCALCEMIA: ICD-10-CM

## 2017-12-01 RX ORDER — CALCITRIOL 0.25 UG/1
CAPSULE, LIQUID FILLED ORAL
Qty: 180 CAPSULE | Refills: 0 | Status: SHIPPED | OUTPATIENT
Start: 2017-12-01 | End: 2018-03-27

## 2017-12-01 NOTE — TELEPHONE ENCOUNTER
Future appt: 1/22/18.    Refill approved.    Estella Rosas LPN  Adult Endocrinology  [facility]

## 2018-01-15 DIAGNOSIS — E89.0 S/P COMPLETE THYROIDECTOMY: ICD-10-CM

## 2018-01-15 DIAGNOSIS — E83.51 HYPOCALCEMIA: ICD-10-CM

## 2018-01-15 DIAGNOSIS — Z97.5 IUD (INTRAUTERINE DEVICE) IN PLACE: Primary | ICD-10-CM

## 2018-01-15 DIAGNOSIS — E89.0 POSTOPERATIVE HYPOTHYROIDISM: ICD-10-CM

## 2018-01-15 LAB
ALBUMIN SERPL-MCNC: 3.8 G/DL (ref 3.4–5)
BUN SERPL-MCNC: 9 MG/DL (ref 7–30)
CALCIUM SERPL-MCNC: 8.3 MG/DL (ref 8.5–10.1)
CREAT SERPL-MCNC: 1.02 MG/DL (ref 0.52–1.04)
GFR SERPL CREATININE-BSD FRML MDRD: 58 ML/MIN/1.7M2
PHOSPHATE SERPL-MCNC: 3.6 MG/DL (ref 2.5–4.5)
T4 FREE SERPL-MCNC: 1.48 NG/DL (ref 0.76–1.46)
TSH SERPL DL<=0.005 MIU/L-ACNC: 2.14 MU/L (ref 0.4–4)

## 2018-01-15 PROCEDURE — 84443 ASSAY THYROID STIM HORMONE: CPT | Performed by: INTERNAL MEDICINE

## 2018-01-15 PROCEDURE — 84100 ASSAY OF PHOSPHORUS: CPT | Performed by: INTERNAL MEDICINE

## 2018-01-15 PROCEDURE — 99000 SPECIMEN HANDLING OFFICE-LAB: CPT | Performed by: INTERNAL MEDICINE

## 2018-01-15 PROCEDURE — 84432 ASSAY OF THYROGLOBULIN: CPT | Mod: 90 | Performed by: INTERNAL MEDICINE

## 2018-01-15 PROCEDURE — 82565 ASSAY OF CREATININE: CPT | Performed by: INTERNAL MEDICINE

## 2018-01-15 PROCEDURE — 84439 ASSAY OF FREE THYROXINE: CPT | Performed by: INTERNAL MEDICINE

## 2018-01-15 PROCEDURE — 82040 ASSAY OF SERUM ALBUMIN: CPT | Performed by: INTERNAL MEDICINE

## 2018-01-15 PROCEDURE — 86800 THYROGLOBULIN ANTIBODY: CPT | Mod: 90 | Performed by: INTERNAL MEDICINE

## 2018-01-15 PROCEDURE — 00000344 ZZHCL STATISTIC REMEASURE THYROGLOBULIN: Mod: 90 | Performed by: INTERNAL MEDICINE

## 2018-01-15 PROCEDURE — 84520 ASSAY OF UREA NITROGEN: CPT | Performed by: INTERNAL MEDICINE

## 2018-01-15 PROCEDURE — 82310 ASSAY OF CALCIUM: CPT | Performed by: INTERNAL MEDICINE

## 2018-01-15 PROCEDURE — 36415 COLL VENOUS BLD VENIPUNCTURE: CPT | Performed by: INTERNAL MEDICINE

## 2018-01-22 ENCOUNTER — OFFICE VISIT (OUTPATIENT)
Dept: ENDOCRINOLOGY | Facility: CLINIC | Age: 48
End: 2018-01-22
Payer: COMMERCIAL

## 2018-01-22 VITALS
HEART RATE: 75 BPM | BODY MASS INDEX: 37.3 KG/M2 | OXYGEN SATURATION: 99 % | HEIGHT: 67 IN | DIASTOLIC BLOOD PRESSURE: 83 MMHG | SYSTOLIC BLOOD PRESSURE: 116 MMHG | TEMPERATURE: 97.8 F | WEIGHT: 237.66 LBS

## 2018-01-22 DIAGNOSIS — E89.0 S/P COMPLETE THYROIDECTOMY: ICD-10-CM

## 2018-01-22 DIAGNOSIS — E83.51 HYPOCALCEMIA: ICD-10-CM

## 2018-01-22 DIAGNOSIS — E89.0 POSTOPERATIVE HYPOTHYROIDISM: ICD-10-CM

## 2018-01-22 PROCEDURE — 99214 OFFICE O/P EST MOD 30 MIN: CPT | Performed by: INTERNAL MEDICINE

## 2018-01-22 RX ORDER — LEVOTHYROXINE SODIUM 200 UG/1
200 TABLET ORAL DAILY
Qty: 90 TABLET | Refills: 0 | Status: SHIPPED | OUTPATIENT
Start: 2018-01-22 | End: 2018-04-25

## 2018-01-22 NOTE — MR AVS SNAPSHOT
After Visit Summary   1/22/2018    Kami Natarajan    MRN: [id number]           Patient Information     Date Of Birth          1970        Visit Information        Provider Department      1/22/2018 9:30 AM Anju Jacobs MD [facility]        Today's Diagnoses     Postoperative hypothyroidism        S/P complete thyroidectomy        Hypocalcemia          Care Instructions    Please call early fall to schedule one year follow up in Jan 2019.          Follow-ups after your visit        Follow-up notes from your care team     Return in about 1 year (around 1/22/2019).      Who to contact     If you have questions or need follow up information about today's clinic visit or your schedule please contact [facility] directly at [phone].  Normal or non-critical lab and imaging results will be communicated to you by [employer], letter or phone within 4 business days after the clinic has received the results. If you do not hear from us within 7 days, please contact the clinic through whistleBoxt or phone. If you have a critical or abnormal lab result, we will notify you by phone as soon as possible.  Submit refill requests through [employer] or call your pharmacy and they will forward the refill request to us. Please allow 3 business days for your refill to be completed.          Additional Information About Your Visit        MyChart Information     [employer] gives you secure access to your electronic health record. If you see a primary care provider, you can also send messages to your care team and make appointments. If you have questions, please call your primary care clinic.  If you do not have a primary care provider, please call [phone] and they will assist you.      [employer] is an electronic gateway that provides easy, online access to your medical records. With [employer], you can request a clinic appointment, read your test results, renew a prescription or communicate  "with your care team.     To access your existing account, please contact your [facility] or call [phone] for assistance.        Care EveryWhere ID     This is your Care EveryWhere ID. This could be used by other organizations to access your [facility] medical records  [id number]        Your Vitals Were     Pulse Temperature Height Last Period Pulse Oximetry BMI (Body Mass Index)    75 97.8  F (36.6  C) 1.689 m (5' 6.5\") 01/17/2018 99% 37.78 kg/m2       Blood Pressure from Last 3 Encounters:   01/22/18 116/83   11/01/17 110/72   07/28/17 110/60    Weight from Last 3 Encounters:   01/22/18 107.8 kg (237 lb 10.5 oz)   11/01/17 105.2 kg (232 lb)   07/28/17 103.8 kg (228 lb 14.4 oz)              Today, you had the following     No orders found for display         Where to get your medicines      These medications were sent to [id number] - [place] - [address]  [address], [place] [address]     Phone:  [phone]     levothyroxine 200 MCG tablet          Primary Care Provider Office Phone # Fax #    Valerio Knox [phone] [address]  [place] [address]        Equal Access to Services     [facility] : Hadii randolph ku hadasho Soomaali, waaxda luqadaha, qaybta kaalmada nayla trammell. So [facility] [phone].    ATENCIÓN: Si habla español, tiene a lange disposición servicios gratuitos de asistencia lingüística. Llame al [phone].    We comply with applicable federal civil rights laws and [place] laws. We do not discriminate on the basis of race, color, national origin, age, disability, sex, sexual orientation, or gender identity.            Thank you!     Thank you for choosing [facility]  for your care. Our goal is always to provide you with excellent care. Hearing back from our patients is one way we can continue to improve our services. Please take a few minutes to " complete the written survey that you may receive in the mail after your visit with us. Thank you!             Your Updated Medication List - Protect others around you: Learn how to safely use, store and throw away your medicines at www.disposemymeds.org.          This list is accurate as of: 1/22/18  9:59 AM.  Always use your most recent med list.                   Brand Name Dispense Instructions for use Diagnosis    ADVAIR DISKUS 250-50 MCG/DOSE diskus inhaler   Generic drug:  fluticasone-salmeterol     1 Inhaler    INHALE ONE PUFF BY MOUTH TWICE DAILY    Mild persistent asthma without complication       albuterol 108 (90 BASE) MCG/ACT Inhaler    PROAIR HFA/PROVENTIL HFA/VENTOLIN HFA    1 Inhaler    Inhale 2 puffs into the lungs every 4 hours as needed for shortness of breath / dyspnea    Mild persistent asthma without complication       calcitRIOL 0.25 MCG capsule    ROCALTROL    180 capsule    Take one capsule by mouth twice daily    S/P complete thyroidectomy, Hypocalcemia, Postoperative hypothyroidism       calcium 600 MG tablet      Take 2 tablets by mouth 2 times daily        CLARITIN 10 MG tablet   Generic drug:  loratadine      Take 10 mg by mouth daily        FLONASE NA      Spray 1 spray in nostril daily        levothyroxine 200 MCG tablet    SYNTHROID/LEVOTHROID    90 tablet    Take 1 tablet (200 mcg) by mouth daily    Postoperative hypothyroidism, S/P complete thyroidectomy, Hypocalcemia       montelukast 10 MG tablet    SINGULAIR    90 tablet    Take 1 tablet (10 mg) by mouth daily    Mild persistent asthma without complication       order for DME     1 each    [id number] Plantar fasciits,ngt Splt,lg    Plantar fasciitis

## 2018-01-22 NOTE — LETTER
1/22/2018         RE: Kami Natarajan  [address] [place] [place] [address]        Dear Colleague,    Thank you for referring your patient, Kami Natarajan, to the [facility]. Please see a copy of my visit note below.    DIAGNOSIS:  Papillary thyroid cancer, status post thyroidectomy and I-131 ablation, post-surgical hypoparathyroidism.      HISTORY OF PRESENT ILLNESS Kami Natarajan is a 47-year-old female who returns for followup of papillary thyroid cancer and post-surgical hypoparathyroidism. Last seen Nov 2016     Summary of prior history:The patient underwent total thyroidectomy on 08/08/2012.  Pathology showed papillary thyroid cancer in both lobes, the dominant tumor in the left lobe with the greatest dimension of 2.1 cm, papillary carcinoma with follicular variant.  The second tumor in the right lobe had the greatest dimension of 0.8 cm and was also papillary carcinoma, follicular variant.  There was no evidence of malignancy in 3 out of 3 lymph nodes on the left neck excision and no cancer in 1 lymph node on the right neck excision.  There was 1 benign parathyroid noted in the pathology.      The patient was treated with 131 mCi of I-131(TSH stimulated) on 01/24/2013.   Her post-therapy scan showed 2 foci of uptake in the anterior neck which were consistent with residual thyroid tissue.  There was no significant uptake noted in any other site in the body.  Prior to ablation, her TSH stimulated thyroglobulin was 0.18 ng/mL.     At  1 year surveillance   - baseline TG was undetectable, TSH stimulated thyroglobulin was 0.18  - Neck U/S (Aug 2013), did not show any suspicious LN    Most recent TSH stimulated TG done Dec 2015  - TSH stimulated thyroglobulin was 0.18  - Neck U/S Nov 2015, did not show any suspicious LN    Interval  History: The patient returns to clinic for followup and denies any acute issues.     She has been taking Levothyroxine 200 mcg daily for post-surgical hypothyroidism.    She denies any local neck symptoms.   Denies any symptoms of hyperthyroidism.      She developed hypocalcemia after thyroidectomy.  She is currently taking 1200 mg of calcium carbonate twice daily and 0.25 mcg of calcitriol trial twice daily.  She denies any symptoms of hypocalcemia.      REVIEW OF SYSTEMS:    Eight point review of systems otherwise was negative, any pertinent symptoms are noted in HPI.     Current Outpatient Prescriptions on File Prior to Visit:  calcitRIOL (ROCALTROL) 0.25 MCG capsule Take one capsule by mouth twice daily   levothyroxine (SYNTHROID/LEVOTHROID) 200 MCG tablet Take 1 tablet (200 mcg) by mouth daily   montelukast (SINGULAIR) 10 MG tablet Take 1 tablet (10 mg) by mouth daily   ADVAIR DISKUS 250-50 MCG/DOSE diskus inhaler INHALE ONE PUFF BY MOUTH TWICE DAILY    albuterol (PROAIR HFA, PROVENTIL HFA, VENTOLIN HFA) 108 (90 BASE) MCG/ACT inhaler Inhale 2 puffs into the lungs every 4 hours as needed for shortness of breath / dyspnea   Fluticasone Propionate (FLONASE NA) Spray 1 spray in nostril daily   loratadine (CLARITIN) 10 MG tablet Take 10 mg by mouth daily   calcium 600 MG tablet Take 2 tablets by mouth 2 times daily   ORDER FOR DME [id number] alyce,Maren,lg (Patient not taking: Reported on 11/1/2017)     No current facility-administered medications on file prior to visit.   PAST MEDICAL HISTORY:   Asthma. 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    has had vasectomy   SOCIAL HISTORY: She is . 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 Does not smoke, drinks alcohol occasionally.   FAMILY HISTORY: Mother had history of thyroid nodule which was evaluated with FNA per her report and was benign. Her daughter has type 1 diabetes, father history of alpha-1 antitrypsin deficiency. Mother also has atrial fibrillation  PE:  /83  " Pulse 75  Temp 97.8  F (36.6  C)  Ht 1.689 m (5' 6.5\")  Wt 107.8 kg (237 lb 10.5 oz)  LMP 01/17/2018  SpO2 99%  BMI 37.78 kg/m2   GENERAL: Pleasant, appears of stated age.   EYES: No proptosis, lid lag or retraction, extra ocular muscle intact. B/l sclera clear  NECK: Well-healed thyroidectomy scar. No masses or palpable lymph nodes.   RESPIRATORY: Clear to auscultation bilaterally.   CARDIAC: Regular rate and rhythm, S1, S2, without murmurs.   NEUROLOGIC: Reflexes 1/4 bilaterally in knees, 1/4 biceps.   PYSCH: mood appropriate     LABORATORY DATA:  Reviewed in epic    Results for FARIDEH MALLOY (MRN [id number]) as of 1/22/2018 09:39   Ref. Range 1/15/2018 12:57   Urea Nitrogen Latest Ref Range: 7 - 30 mg/dL 9   Creatinine Latest Ref Range: 0.52 - 1.04 mg/dL 1.02   GFR Estimate Latest Ref Range: >60 mL/min/1.7m2 58 (L)   GFR Estimate If Black Latest Ref Range: >60 mL/min/1.7m2 70   Calcium Latest Ref Range: 8.5 - 10.1 mg/dL 8.3 (L)   Phosphorus Latest Ref Range: 2.5 - 4.5 mg/dL 3.6   Albumin Latest Ref Range: 3.4 - 5.0 g/dL 3.8     Results for FARIDEH MALLOY (MRN [id number]) as of 1/22/2018 09:39   Ref. Range 1/15/2018 12:57   T4 Free Latest Ref Range: 0.76 - 1.46 ng/dL 1.48 (H)   TSH Latest Ref Range: 0.40 - 4.00 mU/L 2.14     ASSESSMENT AND RECOMMENDATIONS:     1.  Papillary thyroid cancer.  The patient is status post total thyroidectomy on 08/08/2012, her MACIS score is less than 6.  She was treated with a TSH stimulated I-131 on 01/24/2013 with 131 mCi.  Post-therapy scan showed uptake at 2 areas of uptake in the thyroid bed which was consistent with residual thyroid tissue, TSH stimulated thyroglobulin was 0.18 mL.       At  1 year surveillance   - baseline TG was undetectable, TSH stimulated thyroglobulin was 0.18  -Neck U/S, did not show any suspicious LN    Most recent TSH stimulated TG done Dec 2015  - TSH stimulated thyroglobulin was 0.18  - Neck U/S Nov 2015, did not show any suspicious " LN    thyroglobulin level from recent draw is pending    2.  Post-surgical hypothyroidism. recent labs look ok, continue current dose    3.  Post-surgical hypoparathyroidism currently she is asymptomatic, recent Calcium ok, continue current meds    The patient will return to clinic in about 1 year, sooner if any issues     MARIA DE JESUS JACOBS MD                 Again, thank you for allowing me to participate in the care of your patient.        Sincerely,        Maria De Jesus Jacobs MD

## 2018-01-22 NOTE — NURSING NOTE
"Kami Natarajan's goals for this visit include: Follow up Papillary Thyroid Cancer  She requests these members of her care team be copied on today's visit information: YES    PCP: Valerio Knox    Referring Provider:  No referring provider defined for this encounter.    Chief Complaint   Patient presents with     RECHECK     Thyroid Cancer       Initial Ht 1.689 m (5' 6.5\")  Wt 107.8 kg (237 lb 10.5 oz)  LMP 01/17/2018  BMI 37.78 kg/m2 Estimated body mass index is 37.78 kg/(m^2) as calculated from the following:    Height as of this encounter: 1.689 m (5' 6.5\").    Weight as of this encounter: 107.8 kg (237 lb 10.5 oz).  Medication Reconciliation: complete    Do you need any medication refills at today's visit? YES    "

## 2018-01-22 NOTE — PROGRESS NOTES
DIAGNOSIS:  Papillary thyroid cancer, status post thyroidectomy and I-131 ablation, post-surgical hypoparathyroidism.      HISTORY OF PRESENT ILLNESS Kami Natarajan is a 47-year-old female who returns for followup of papillary thyroid cancer and post-surgical hypoparathyroidism. Last seen Nov 2016     Summary of prior history:The patient underwent total thyroidectomy on 08/08/2012.  Pathology showed papillary thyroid cancer in both lobes, the dominant tumor in the left lobe with the greatest dimension of 2.1 cm, papillary carcinoma with follicular variant.  The second tumor in the right lobe had the greatest dimension of 0.8 cm and was also papillary carcinoma, follicular variant.  There was no evidence of malignancy in 3 out of 3 lymph nodes on the left neck excision and no cancer in 1 lymph node on the right neck excision.  There was 1 benign parathyroid noted in the pathology.      The patient was treated with 131 mCi of I-131(TSH stimulated) on 01/24/2013.   Her post-therapy scan showed 2 foci of uptake in the anterior neck which were consistent with residual thyroid tissue.  There was no significant uptake noted in any other site in the body.  Prior to ablation, her TSH stimulated thyroglobulin was 0.18 ng/mL.     At  1 year surveillance   - baseline TG was undetectable, TSH stimulated thyroglobulin was 0.18  - Neck U/S (Aug 2013), did not show any suspicious LN    Most recent TSH stimulated TG done Dec 2015  - TSH stimulated thyroglobulin was 0.18  - Neck U/S Nov 2015, did not show any suspicious LN    Interval  History: The patient returns to clinic for followup and denies any acute issues.     She has been taking Levothyroxine 200 mcg daily for post-surgical hypothyroidism.   She denies any local neck symptoms.   Denies any symptoms of hyperthyroidism.      She developed hypocalcemia after thyroidectomy.  She is currently taking 1200 mg of calcium carbonate twice daily and 0.25 mcg of calcitriol trial twice  "daily.  She denies any symptoms of hypocalcemia.      REVIEW OF SYSTEMS:    Eight point review of systems otherwise was negative, any pertinent symptoms are noted in HPI.     Current Outpatient Prescriptions on File Prior to Visit:  calcitRIOL (ROCALTROL) 0.25 MCG capsule Take one capsule by mouth twice daily   levothyroxine (SYNTHROID/LEVOTHROID) 200 MCG tablet Take 1 tablet (200 mcg) by mouth daily   montelukast (SINGULAIR) 10 MG tablet Take 1 tablet (10 mg) by mouth daily   ADVAIR DISKUS 250-50 MCG/DOSE diskus inhaler INHALE ONE PUFF BY MOUTH TWICE DAILY    albuterol (PROAIR HFA, PROVENTIL HFA, VENTOLIN HFA) 108 (90 BASE) MCG/ACT inhaler Inhale 2 puffs into the lungs every 4 hours as needed for shortness of breath / dyspnea   Fluticasone Propionate (FLONASE NA) Spray 1 spray in nostril daily   loratadine (CLARITIN) 10 MG tablet Take 10 mg by mouth daily   calcium 600 MG tablet Take 2 tablets by mouth 2 times daily   ORDER FOR DME [id number] frias lg (Patient not taking: Reported on 11/1/2017)     No current facility-administered medications on file prior to visit.   PAST MEDICAL HISTORY:   Asthma. 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    has had vasectomy   SOCIAL HISTORY: She is . 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 Does not smoke, drinks alcohol occasionally.   FAMILY HISTORY: Mother had history of thyroid nodule which was evaluated with FNA per her report and was benign. Her daughter has type 1 diabetes, father history of alpha-1 antitrypsin deficiency. Mother also has atrial fibrillation  PE:  /83  Pulse 75  Temp 97.8  F (36.6  C)  Ht 1.689 m (5' 6.5\")  Wt 107.8 kg (237 lb 10.5 oz)  LMP 01/17/2018  SpO2 99%  BMI 37.78 kg/m2   GENERAL: Pleasant, appears of stated age.   EYES: No proptosis, lid lag or retraction, extra " ocular muscle intact. B/l sclera clear  NECK: Well-healed thyroidectomy scar. No masses or palpable lymph nodes.   RESPIRATORY: Clear to auscultation bilaterally.   CARDIAC: Regular rate and rhythm, S1, S2, without murmurs.   NEUROLOGIC: Reflexes 1/4 bilaterally in knees, 1/4 biceps.   PYSCH: mood appropriate     LABORATORY DATA:  Reviewed in epic    Results for FARIDEH MALLOY (MRN [id number]) as of 1/22/2018 09:39   Ref. Range 1/15/2018 12:57   Urea Nitrogen Latest Ref Range: 7 - 30 mg/dL 9   Creatinine Latest Ref Range: 0.52 - 1.04 mg/dL 1.02   GFR Estimate Latest Ref Range: >60 mL/min/1.7m2 58 (L)   GFR Estimate If Black Latest Ref Range: >60 mL/min/1.7m2 70   Calcium Latest Ref Range: 8.5 - 10.1 mg/dL 8.3 (L)   Phosphorus Latest Ref Range: 2.5 - 4.5 mg/dL 3.6   Albumin Latest Ref Range: 3.4 - 5.0 g/dL 3.8     Results for FARIDEH MALLOY (MRN [id number]) as of 1/22/2018 09:39   Ref. Range 1/15/2018 12:57   T4 Free Latest Ref Range: 0.76 - 1.46 ng/dL 1.48 (H)   TSH Latest Ref Range: 0.40 - 4.00 mU/L 2.14     ASSESSMENT AND RECOMMENDATIONS:     1.  Papillary thyroid cancer.  The patient is status post total thyroidectomy on 08/08/2012, her MACIS score is less than 6.  She was treated with a TSH stimulated I-131 on 01/24/2013 with 131 mCi.  Post-therapy scan showed uptake at 2 areas of uptake in the thyroid bed which was consistent with residual thyroid tissue, TSH stimulated thyroglobulin was 0.18 mL.       At  1 year surveillance   - baseline TG was undetectable, TSH stimulated thyroglobulin was 0.18  -Neck U/S, did not show any suspicious LN    Most recent TSH stimulated TG done Dec 2015  - TSH stimulated thyroglobulin was 0.18  - Neck U/S Nov 2015, did not show any suspicious LN    thyroglobulin level from recent draw is pending    2.  Post-surgical hypothyroidism. recent labs look ok, continue current dose    3.  Post-surgical hypoparathyroidism currently she is asymptomatic, recent Calcium ok, continue current  meds    The patient will return to clinic in about 1 year, sooner if any issues     MARIA DE JESUS UREÑA MD

## 2018-01-23 DIAGNOSIS — J45.30 MILD PERSISTENT ASTHMA WITHOUT COMPLICATION: ICD-10-CM

## 2018-01-24 LAB — LAB SCANNED RESULT: NORMAL

## 2018-01-25 NOTE — TELEPHONE ENCOUNTER
"Requested Prescriptions   Pending Prescriptions Disp Refills     ADVAIR DISKUS 250-50 MCG/DOSE diskus inhaler [Pharmacy Med Name: Advair Diskus Inhalation Aerosol Powder Breath Activated 250-50 MCG/DOSE]  1     Sig: INHALE ONE PUFF BY MOUTH TWICE DAILY    Inhaled Steroids Protocol Passed    1/23/2018  7:00 AM       Passed - Patient is age 12 or older       Passed - Asthma control test 20 or greater in last 6 months    Please review ACT score.          Passed - Recent (6 mo) or future visit with authorizing provider's specialty    Patient had office visit in the last 6 months or has a visit in the next 30 days with authorizing provider.  See \"Patient Info\" tab in inbasket, or \"Choose Columns\" in Meds & Orders section of the refill encounter.            Prescription approved per [facility] Refill Protocol.    Mary Kay Atkinson RN    "

## 2018-01-29 DIAGNOSIS — E89.0 POSTOPERATIVE HYPOTHYROIDISM: ICD-10-CM

## 2018-01-29 DIAGNOSIS — E83.51 HYPOCALCEMIA: ICD-10-CM

## 2018-01-29 DIAGNOSIS — E89.0 S/P COMPLETE THYROIDECTOMY: ICD-10-CM

## 2018-01-29 RX ORDER — LEVOTHYROXINE SODIUM 200 UG/1
TABLET ORAL
Qty: 90 TABLET | Refills: 0 | Status: SHIPPED | OUTPATIENT
Start: 2018-01-29 | End: 2019-01-04

## 2018-03-12 ENCOUNTER — TELEPHONE (OUTPATIENT)
Dept: FAMILY MEDICINE | Facility: CLINIC | Age: 48
End: 2018-03-12

## 2018-03-12 NOTE — TELEPHONE ENCOUNTER
Patient can be contacted and informed that the generic form could be tried for maintaining asthma control.  It would likely be less expensive.  If it did not work adequately to control her asthma, the brand name could be restarted.    Valerio Knox MD  Please close encounter when call/work completed.

## 2018-03-12 NOTE — TELEPHONE ENCOUNTER
Reason for Call:  Other call back    Detailed comments: Kami got a letter from her insurance and said there is a generic for her Advair and it is called Fluticasone salmeterol and she wondering what you thought about that.  Please call    Phone Number Patient can be reached at: Cell number on file:    Telephone Information:   Mobile [phone]       Best Time: any    Can we leave a detailed message on this number? YES    Call taken on 3/12/2018 at 12:20 PM by Mojgan Castrejon

## 2018-03-22 DIAGNOSIS — J45.30 MILD PERSISTENT ASTHMA WITHOUT COMPLICATION: ICD-10-CM

## 2018-03-22 NOTE — TELEPHONE ENCOUNTER
advair diskus    ACT Total Scores 11/1/2017   ACT TOTAL SCORE -   ASTHMA ER VISITS -   ASTHMA HOSPITALIZATIONS -   ACT TOTAL SCORE (Goal Greater than or Equal to 20) 21   In the past 12 months, how many times did you visit the emergency room for your asthma without being admitted to the hospital? 0   In the past 12 months, how many times were you hospitalized overnight because of your asthma? 0     Prescription approved per [facility] Refill Protocol.    Maryana Davis RN, BSN

## 2018-03-27 DIAGNOSIS — E83.51 HYPOCALCEMIA: ICD-10-CM

## 2018-03-27 DIAGNOSIS — E89.0 POSTOPERATIVE HYPOTHYROIDISM: ICD-10-CM

## 2018-03-27 DIAGNOSIS — E89.0 S/P COMPLETE THYROIDECTOMY: ICD-10-CM

## 2018-03-27 RX ORDER — CALCITRIOL 0.25 UG/1
CAPSULE, LIQUID FILLED ORAL
Qty: 180 CAPSULE | Refills: 0 | Status: SHIPPED | OUTPATIENT
Start: 2018-03-27 | End: 2018-06-26

## 2018-04-25 DIAGNOSIS — E89.0 S/P COMPLETE THYROIDECTOMY: ICD-10-CM

## 2018-04-25 DIAGNOSIS — E89.0 POSTOPERATIVE HYPOTHYROIDISM: ICD-10-CM

## 2018-04-25 DIAGNOSIS — E83.51 HYPOCALCEMIA: ICD-10-CM

## 2018-04-25 RX ORDER — LEVOTHYROXINE SODIUM 200 UG/1
TABLET ORAL
Qty: 90 TABLET | Refills: 2 | Status: SHIPPED | OUTPATIENT
Start: 2018-04-25 | End: 2019-04-27

## 2018-05-23 DIAGNOSIS — J45.30 MILD PERSISTENT ASTHMA WITHOUT COMPLICATION: ICD-10-CM

## 2018-05-25 NOTE — TELEPHONE ENCOUNTER
Advair Diskus    ACT Total Scores 11/1/2017   ACT TOTAL SCORE -   ASTHMA ER VISITS -   ASTHMA HOSPITALIZATIONS -   ACT TOTAL SCORE (Goal Greater than or Equal to 20) 21   In the past 12 months, how many times did you visit the emergency room for your asthma without being admitted to the hospital? 0   In the past 12 months, how many times were you hospitalized overnight because of your asthma? 0     Routing refill request to provider for review/approval because:  Labs not current:  ACT  Patient needs to be seen because:  LOV 11/1/2017 states F/U in 6 months    Maryana Davis, RN, BSN

## 2018-06-26 DIAGNOSIS — E89.0 POSTOPERATIVE HYPOTHYROIDISM: ICD-10-CM

## 2018-06-26 DIAGNOSIS — E83.51 HYPOCALCEMIA: ICD-10-CM

## 2018-06-26 DIAGNOSIS — E89.0 S/P COMPLETE THYROIDECTOMY: ICD-10-CM

## 2018-06-26 RX ORDER — CALCITRIOL 0.25 UG/1
CAPSULE, LIQUID FILLED ORAL
Qty: 180 CAPSULE | Refills: 1 | Status: SHIPPED | OUTPATIENT
Start: 2018-06-26 | End: 2018-12-18

## 2018-08-09 ENCOUNTER — TRANSFERRED RECORDS (OUTPATIENT)
Dept: HEALTH INFORMATION MANAGEMENT | Facility: CLINIC | Age: 48
End: 2018-08-09

## 2018-11-15 DIAGNOSIS — J45.30 MILD PERSISTENT ASTHMA WITHOUT COMPLICATION: ICD-10-CM

## 2018-11-15 RX ORDER — MONTELUKAST SODIUM 10 MG/1
TABLET ORAL
Qty: 30 TABLET | Refills: 0 | Status: SHIPPED | OUTPATIENT
Start: 2018-11-15 | End: 2019-01-04

## 2018-11-15 NOTE — TELEPHONE ENCOUNTER
"Requested Prescriptions   Pending Prescriptions Disp Refills     montelukast (SINGULAIR) 10 MG tablet [Pharmacy Med Name: Montelukast Sodium Oral Tablet 10 MG] 90 tablet 2     Sig: Take 1 tablet (10 mg) by mouth daily    Leukotriene Inhibitors Protocol Failed    11/15/2018  7:00 AM       Failed - Asthma control assessment score within normal limits in last 6 months    Please review ACT score.   ACT Total Scores 11/1/2017   ACT TOTAL SCORE -   ASTHMA ER VISITS -   ASTHMA HOSPITALIZATIONS -   ACT TOTAL SCORE (Goal Greater than or Equal to 20) 21   In the past 12 months, how many times did you visit the emergency room for your asthma without being admitted to the hospital? 0   In the past 12 months, how many times were you hospitalized overnight because of your asthma? 0          Failed - Recent (6 mo) or future (30 days) visit within the authorizing provider's specialty    Patient had office visit in the last 6 months or has a visit in the next 30 days with authorizing provider or within the authorizing provider's specialty.  See \"Patient Info\" tab in inbasket, or \"Choose Columns\" in Meds & Orders section of the refill encounter.           Passed - Patient is age 12 or older    If patient is under 16, ok to refill using age based dosing.           montelukast (SINGULAIR) 10 MG tablet  Medication is being filled for 1 time refill only due to:  Patient needs to be seen because due for physical and ACT update. Patient needs to be seen because it has been more than one year since last visit.    Please assist with scheduling.    Cyndy Delgadillo RN, BSN       "

## 2018-11-16 NOTE — TELEPHONE ENCOUNTER
Patient's daughter informed.     She will let patient know. Daughter states patient  is establishing care with a new provider.

## 2018-11-25 ENCOUNTER — TELEPHONE (OUTPATIENT)
Dept: FAMILY MEDICINE | Facility: CLINIC | Age: 48
End: 2018-11-25

## 2018-11-25 DIAGNOSIS — J45.30 MILD PERSISTENT ASTHMA WITHOUT COMPLICATION: ICD-10-CM

## 2018-11-25 NOTE — LETTER
[facility]  [address]  [place] [address]  [phone]      November 28, 2018    Kami Bjorgan                                                                                                                     [address]  [place] [address]        Dear Kami,    We have attempted to contact you by telephone without success.  We have sent a one time refill for the medication advair to the pharmacy for you.  In order to continue refilling this medication, we will need you to schedule an appointment for an office visit with your Provider.  You can call us at [phone] to schedule this appointment.      Sincerely,     [facility] Support Staff / mery

## 2018-11-26 NOTE — TELEPHONE ENCOUNTER
Advair    ACT Total Scores 11/1/2017   ACT TOTAL SCORE -   ASTHMA ER VISITS -   ASTHMA HOSPITALIZATIONS -   ACT TOTAL SCORE (Goal Greater than or Equal to 20) 21   In the past 12 months, how many times did you visit the emergency room for your asthma without being admitted to the hospital? 0   In the past 12 months, how many times were you hospitalized overnight because of your asthma? 0     LOV 11/1/2017    Routing refill request to provider for review/approval because:  Labs out of range:  ACT  Pt needs OV    Maryana Davis RN, BSN

## 2018-11-27 NOTE — TELEPHONE ENCOUNTER
Last visit in primary care was 11/2017 (). Refill x1 mo. Please schedule pt for visit- will need to establish with new PCP due to DA alf. Frances Reynolds PA-C

## 2018-12-18 DIAGNOSIS — E89.0 S/P COMPLETE THYROIDECTOMY: ICD-10-CM

## 2018-12-18 DIAGNOSIS — E83.51 HYPOCALCEMIA: ICD-10-CM

## 2018-12-18 DIAGNOSIS — E89.0 POSTOPERATIVE HYPOTHYROIDISM: ICD-10-CM

## 2018-12-18 RX ORDER — CALCITRIOL 0.25 UG/1
CAPSULE, LIQUID FILLED ORAL
Qty: 180 CAPSULE | Refills: 0 | Status: SHIPPED | OUTPATIENT
Start: 2018-12-18 | End: 2019-01-04

## 2018-12-18 NOTE — TELEPHONE ENCOUNTER
Name from pharmacy: Calcitriol Oral Capsule 0.25 MCG          Will file in chart as: calcitRIOL (ROCALTROL) 0.25 MCG capsule    Sig: TAKE ONE CAPSULE BY MOUTH TWICE DAILY     Disp:  180 capsule (Pharmacy requested: 180)    Refills:  0    Start: 12/18/2018    Class: E-Prescribe    Non-formulary          Chart reviewed. Last office visit with Dr. Jacobs on 1/22/18. No future office visit scheduled with new endocrinologist or [place] [facility] [place] Will review medication and send to clinic staff to schedule follow-up office visit.      Johanna Rodríguez RN  Endocrine Care Coordinator  [facility]

## 2018-12-18 NOTE — TELEPHONE ENCOUNTER
I left a message for Kami to call back to schedule her 1 yr follow up appointment in Endocrinology. I asked her to call [phone] to schedule. I let Kami know that Dr Jacobs is no longer coming to the [place] location so if she would like to continue to follow with Dr Jacobs she [place] [facility] [place] (Patient should be scheduled as a return, but with a new patient length for her appointment)    Richard Pacheco  Procedure , [place]  Peds Specialty and Adult Endocrinology

## 2018-12-27 NOTE — PROGRESS NOTES
SUBJECTIVE:   CC: Kami Natarajan is an 48 year old woman who presents for preventive health visit.     Physical   Annual:     Getting at least 3 servings of Calcium per day:  Yes    Bi-annual eye exam:  Yes    Dental care twice a year:  Yes    Sleep apnea or symptoms of sleep apnea:  Excessive snoring    Diet:  Regular (no restrictions)    Frequency of exercise:  2-3 days/week    Duration of exercise:  15-30 minutes    Taking medications regularly:  Yes    Medication side effects:  None    Additional concerns today:  Yes    PHQ-2 Total Score: 0    The patient is a previous patient of Dr. Knox.    She is requesting to have refills of her medication.     Eyes  The patient notes that she was at the eye doctor earlier this morning, so her eye is still dilated.     Thyroid cancer   The patient states that she has thyroid cancer. She notes that she follows up regularly with a Doctor, but he left to go to a different clinic location. She is wondering if she needs to go see a specialist, or if a regular clinic visit will be good. She states that she is not aware of any issues as of now, but she is taking her medication as prescribed. The patient notes that she had her thyroid removed and had radiation done.     Calcium levels   She notes that she drinks a lot of coca cola. She thinks that her drinking of soda has affected her calcium levels. She notes that her calcium levels are generally low.     Leg cramps   She notes that she will occassionally have leg cramps. She states that she drinks Tonic water and it will help her symptoms.     Cough  The patient states that her asthma seems to be fine at this point. She states that she has a cold that has been recurring since September. She notes that she has a phlegmy cough that she cannot shake. She states that it is not bad, but she will have cough with phlegm. She notes that she used her albuterol at times, but she did not have shortness of breath atthose times. She states  "that she was feeling \"tight\" during these episodes.     Acid reflux  The patient notes that she takes 10 MG of Prilosec OTC every other day. She states that she had acid reflux over night when lying down. The patient states that she used this for one week, then stopped since her symptoms were relieved. She notes that the symptoms came back, so she started to use the medication again. She relates her acid reflux to soda and tomato sauce consumption.     Hemorrhoids  The patient notes that she has had little hemorrhoids for a while.  She states that she does not have any burning, but experiences itching. The patient states that Dr. Knox gave her a cream that helped a lot, but she does not have any left. She notes that the hemorrhoids are annoying. She states that she does not have constipation, but she does have diarrhea. The patient notes that she has minimal bleeding, and it is very spotty.     Today's PHQ-2 Score:   PHQ-2 ( 1999 Pfizer) 1/4/2019   Q1: Little interest or pleasure in doing things 0   Q2: Feeling down, depressed or hopeless 0   PHQ-2 Score 0   Q1: Little interest or pleasure in doing things Not at all   Q2: Feeling down, depressed or hopeless Not at all   PHQ-2 Score 0       Abuse: Current or Past(Physical, Sexual or Emotional)- NO  Do you feel safe in your environment? Yes    Social History     Tobacco Use     Smoking status: Former Smoker     Smokeless tobacco: Never Used   Substance Use Topics     Alcohol use: Yes     Comment: occasionally     Alcohol Use 1/4/2019   If you drink alcohol do you typically have greater than 3 drinks per day OR greater than 7 drinks per week? No   No flowsheet data found.    Reviewed orders with patient.  Reviewed health maintenance and updated orders accordingly - Yes  BP Readings from Last 3 Encounters:   01/04/19 108/80   01/22/18 116/83   11/01/17 110/72    Wt Readings from Last 3 Encounters:   01/04/19 108.8 kg (239 lb 12.8 oz)   01/22/18 107.8 kg (237 lb 10.5 " oz)   11/01/17 105.2 kg (232 lb)                  Patient Active Problem List   Diagnosis     Mild persistent asthma     CARDIOVASCULAR SCREENING; LDL GOAL LESS THAN 160     Hirsutism     Alpha-1-antitrypsin deficiency carrier (H)     Multiple respiratory allergies     Plantar fasciitis     Hip strain     Thyroid nodule     Thyroid cancer (H)     Papillary thyroid carcinoma (H)     S/P complete thyroidectomy     Hypocalcemia     Postoperative hypothyroidism     Postsurgical hypoparathyroidism (H)     Menstrual changes     Metrorrhagia     IUD (intrauterine device) in place     Past Surgical History:   Procedure Laterality Date     DILATION AND CURETTAGE, HYSTEROSCOPY DIAGNOSTIC, COMBINED N/A 07/13/2017     DILATION AND CURETTAGE, OPERATIVE HYSTEROSCOPY, COMBINED N/A 7/13/2017    Procedure: COMBINED DILATION AND CURETTAGE, OPERATIVE HYSTEROSCOPY;  Hysteroscopy, D&C, Mirena insertion;  Surgeon: Sierra Lane DO;  Location: [facility] OR     HERNIA REPAIR, INGUINAL RT/LT  infant     INSERT INTRAUTERINE DEVICE N/A 7/13/2017    Procedure: INSERT INTRAUTERINE DEVICE;;  Surgeon: Sierra Lane DO;  Location: [facility] OR     THYROIDECTOMY  8/8/2012    Procedure: THYROIDECTOMY;  Total Thyroidectomy and Central Neck Dissection;  Surgeon: Philomena Ruiz MD;  Location: [facility] OR       Social History     Tobacco Use     Smoking status: Former Smoker     Smokeless tobacco: Never Used   Substance Use Topics     Alcohol use: Yes     Comment: occasionally     Family History   Problem Relation Age of Onset     Asthma Father      Cancer Father         cirrhosis and liver cancer     Genetic Disorder Father         alpha 1 antitrypsin deficiency     Thyroid Disease Mother         also on blood thinner. unclear history         Current Outpatient Medications   Medication Sig Dispense Refill     albuterol (PROAIR HFA/PROVENTIL HFA/VENTOLIN HFA) 108 (90 Base) MCG/ACT inhaler Inhale 2 puffs into the lungs every 4 hours as needed for  shortness of breath / dyspnea 1 Inhaler 6     calcitRIOL (ROCALTROL) 0.25 MCG capsule Take 1 capsule (0.25 mcg) by mouth 2 times daily 180 capsule 3     calcium 600 MG tablet Take 2 tablets by mouth 2 times daily       Fluticasone Propionate (FLONASE NA) Spray 1 spray in nostril daily       fluticasone-salmeterol (ADVAIR DISKUS) 250-50 MCG/DOSE inhaler INHALE ONE PUFF BY MOUTH TWICE DAILY 3 Inhaler 3     hydrocortisone (ANUSOL-HC) 2.5 % cream Place 1 g rectally 2 times daily as needed for hemorrhoids 30 g 5     levothyroxine (SYNTHROID/LEVOTHROID) 200 MCG tablet Take 1 tablet (200 mcg) by mouth daily 90 tablet 2     loratadine (CLARITIN) 10 MG tablet Take 10 mg by mouth daily       montelukast (SINGULAIR) 10 MG tablet Take 1 tablet (10 mg) by mouth daily 90 tablet 3     omeprazole (PRILOSEC) 10 MG DR capsule Take 20 mg by mouth every other day       ORDER FOR DME [id number]  Plantar fasciits,ngt  Splt,lg   1 each 0     predniSONE (DELTASONE) 20 MG tablet Take 20 mg by mouth daily for 7 days. 5 tablet 0     No Known Allergies  Recent Labs   Lab Test 01/15/18  1257 07/10/17  1041 04/14/17  1034 11/21/16  1646  04/29/16  1056  10/15/15  0953  11/05/14  1300   A1C  --   --   --   --   --   --   --   --   --  5.3   LDL  --   --  93  --   --  105*  --  121  --   --    HDL  --   --  51  --   --  49*  --  47*  --   --    TRIG  --   --  127  --   --  76  --  71  --   --    ALT  --   --  21  --   --   --   --   --   --   --    CR 1.02 1.03 0.94  --   --  0.91  --  0.96  --   --    GFRESTIMATED 58* 57* 63  --   --  66  --  62  --   --    GFRESTBLACK 70 69 76  --   --  80  --  75  --   --    POTASSIUM  --  3.7 4.1  --   --  3.7  --  3.9   < >  --    TSH 2.14  --   --  1.19   < >  --    < >  --   --   --     < > = values in this interval not displayed.        Mammogram Screening: Patient under age 50, mutual decision reflected in health maintenance.      Pertinent mammograms are reviewed under the imaging tab.  History of  "abnormal Pap smear: NO - age 30- 65 PAP every 3 years recommended  PAP / HPV Latest Ref Rng & Units 4/14/2017 2/14/2014 10/21/2011   PAP - NIL NIL NIL   HPV 16 DNA NEG Negative - -   HPV 18 DNA NEG Negative - -   OTHER HR HPV NEG Negative - -     Reviewed and updated as needed this visit by clinical staff  Tobacco  Allergies  Meds  Problems  Med Hx  Surg Hx  Fam Hx  Soc Hx          Reviewed and updated as needed this visit by Provider  Tobacco  Allergies  Meds  Problems  Med Hx  Surg Hx  Fam Hx        Review of Systems   Constitutional: Negative for chills and fever.   HENT: Negative for congestion, ear pain, hearing loss and sore throat.    Eyes: Negative for pain and visual disturbance.   Respiratory: Positive for cough. Negative for shortness of breath.    Cardiovascular: Negative for chest pain, palpitations and peripheral edema.   Gastrointestinal: Positive for heartburn. Negative for abdominal pain, constipation, diarrhea, hematochezia and nausea.   Breasts:  Negative for tenderness, breast mass and discharge.   Genitourinary: Negative for dysuria, frequency, genital sores, hematuria, pelvic pain, urgency, vaginal bleeding and vaginal discharge.   Musculoskeletal: Negative for arthralgias, joint swelling and myalgias.   Skin: Negative for rash.   Neurological: Negative for dizziness, weakness, headaches and paresthesias.   Psychiatric/Behavioral: Negative for mood changes. The patient is not nervous/anxious.      This document serves as a record of the services and decisions personally performed and made by Fabiana Ramos MD. It was created on her behalf by Malika Gonsalves, a trained medical scribe. The creation of this document is based the provider's statements to the medical scribe.    Malika Gonsalves January 4, 2019 1:06 PM   OBJECTIVE:   /80   Pulse 79   Temp 99.4  F (37.4  C) (Temporal)   Resp 18   Ht 1.673 m (5' 5.85\")   Wt 108.8 kg (239 lb 12.8 oz)   LMP 12/25/2018 (Within " Days)   SpO2 97%   BMI 38.89 kg/m    Physical Exam  GENERAL: healthy, alert and no distress  EYES: Eyes grossly normal to inspection, PERRL and conjunctivae and sclerae normal  HENT: ear canals and TM's normal, nose and mouth without ulcers or lesions  NECK: no adenopathy, no asymmetry, masses, or scars and thyroid normal to palpation  RESP: lungs clear to auscultation - no rales, rhonchi or wheezes  BREAST: normal without masses, tenderness or nipple discharge and no palpable axillary masses or adenopathy  CV: regular rate and rhythm, normal S1 S2, no S3 or S4, no murmur, click or rub, no peripheral edema and peripheral pulses strong  ABDOMEN: soft, nontender, no hepatosplenomegaly, no masses and bowel sounds normal  MS: no gross musculoskeletal defects noted, no edema  SKIN: no suspicious lesions or rashes  NEURO: Normal strength and tone, mentation intact and speech normal  PSYCH: mentation appears normal, affect normal/bright    Diagnostic Test Results:  pending    ASSESSMENT/PLAN:   1. Encounter for routine adult health examination without abnormal findings  Patient is a previous patient of Dr. Knox.  Labs have been ordered.  Awaiting results.  See counseling messages below.     2. Malignant neoplasm of thyroid gland (H)  Patient reports having history of thyroid cancer.  She reports that thyroid has been removed and has received radiation about 5 years ago. .  Patient reports taking medication as prescribed.   She declines any problems currently.   She will follow up with me in regards to thyroid as her specialist left the area.    If needed, further consultation will be given by specialist.     3. Postsurgical hypoparathyroidism (H)  See #2  Labs have been ordered.  Awaiting results.   - Phosphorus    4. Mild persistent asthma without complication  Patient reports having recurring cold since September.   Cold is accompanied by cough with phlegm.   Prednisone has been prescribed to see if this improves  cough.  If symptoms worsen, patient will contact me.   Doing well. Well controlled. Tolerating medication.  No change in plan.   Refills have been ordered.   - fluticasone-salmeterol (ADVAIR DISKUS) 250-50 MCG/DOSE inhaler; INHALE ONE PUFF BY MOUTH TWICE DAILY  Dispense: 3 Inhaler; Refill: 3  - montelukast (SINGULAIR) 10 MG tablet; Take 1 tablet (10 mg) by mouth daily  Dispense: 90 tablet; Refill: 3  - albuterol (PROAIR HFA/PROVENTIL HFA/VENTOLIN HFA) 108 (90 Base) MCG/ACT inhaler; Inhale 2 puffs into the lungs every 4 hours as needed for shortness of breath / dyspnea  Dispense: 1 Inhaler; Refill: 6  - predniSONE (DELTASONE) 20 MG tablet; Take 20 mg by mouth daily for 7 days.  Dispense: 5 tablet; Refill: 0    5. S/P complete thyroidectomy  Patient reports having previous history of thyroid cancer.  She reports that thyroid has been removed and has received radiation.  Patient reports taking medication as prescribed.   She declines any problems currently.   She will follow up with me in regards to thyroid.   If needed, further consultation will be given by specialist.   Labs have been ordered.  Awaiting results.   Refills have been ordered.   Dose adjustment as needed.   - TSH  - T4 FREE  - calcitRIOL (ROCALTROL) 0.25 MCG capsule; Take 1 capsule (0.25 mcg) by mouth 2 times daily  Dispense: 180 capsule; Refill: 3    6. Hypocalcemia  Patient reports having history of low calcium levels.   She reports that her high consumption of [employer] products may contribute to her low levels of calcium levels.   Patient reports that she will discontinue use of [employer] products for an extended amount of time to see if calcium levels increase.  Refills have been ordered.   - calcitRIOL (ROCALTROL) 0.25 MCG capsule; Take 1 capsule (0.25 mcg) by mouth 2 times daily  Dispense: 180 capsule; Refill: 3    7. Postoperative hypothyroidism  Patient reports having previous history of thyroid cancer.  She reports that thyroid has been  "removed and has received radiation.  Patient reports taking medication as prescribed.   She declines any problems currently.   She will follow up with me in regards to thyroid.   If needed, further consultation will be given by specialist.   Labs have been ordered.  Awaiting results.   Refills have been ordered.  Dose adjustment as needed.   - TSH  - T4 FREE  - calcitRIOL (ROCALTROL) 0.25 MCG capsule; Take 1 capsule (0.25 mcg) by mouth 2 times daily  Dispense: 180 capsule; Refill: 3    8. External hemorrhoids  Patient reports having a history external hemorrhoids.   She declines burning or constipation.   Patient reports having itching, minimal/spotty bleeding and diarrhea.   She correlates her diarrhea to her consumption of Coca [employer].   Patient reports that she was previously prescribed Hydrocortisone and received relief from symptoms.   Hydrocortisone has been prescribed.  If symptoms worsen, patient will contact me.  Referral to specialist may be given at that point.   - hydrocortisone (ANUSOL-HC) 2.5 % cream; Place 1 g rectally 2 times daily as needed for hemorrhoids  Dispense: 30 g; Refill: 5    9. Lipid screening  Labs have been ordered.  Awaiting results.   - Lipid panel reflex to direct LDL Fasting    10. Screening for diabetes mellitus  Labs have been ordered.  Will notify with results.   - Comprehensive metabolic panel    11. Encounter for long-term (current) use of medications  Patient is doing well on current medication.   Labs have been ordered.  Awaiting results.   Dose adjustment on medications as needed.  - Comprehensive metabolic panel    COUNSELING:  Special attention given to:        Regular exercise       Healthy diet/nutrition       Vision screening    BP Readings from Last 1 Encounters:   01/04/19 108/80     Estimated body mass index is 38.89 kg/m  as calculated from the following:    Height as of this encounter: 1.673 m (5' 5.85\").    Weight as of this encounter: 108.8 kg (239 lb 12.8 " oz).      Weight management plan: Discussed healthy diet and exercise guidelines     reports that she has quit smoking. she has never used smokeless tobacco.     Due for colonoscopy this year. She plans for summer. Will call back when ready to schedule.       Counseling Resources:  ATP IV Guidelines  Pooled Cohorts Equation Calculator  Breast Cancer Risk Calculator  FRAX Risk Assessment  ICSI Preventive Guidelines  Dietary Guidelines for Americans, 2010  USDA's MyPlate  ASA Prophylaxis  Lung CA Screening    The information in this document, created by the medical scribe for me, accurately reflects the services I personally performed and the decisions made by me. I have reviewed and approved this document for accuracy prior to leaving the patient care area.    EMILY SEPULVEDA MD, MD  [facility]

## 2018-12-27 NOTE — PATIENT INSTRUCTIONS
I have ordered labs for you. I will notify you with results.     If your acid reflux worsens, contact me.     You can use the cream I have prescribed for your hemorrhoids. If symptoms worsen, contact me.     Prednisone 20 mg daily for 5 days for cough.     Call back to schedule colonoscopy.     Preventive Health Recommendations  Female Ages 40 to 49    Yearly exam:     See your health care provider every year in order to  1. Review health changes.   2. Discuss preventive care.    3. Review your medicines if your doctor prescribed any.      Get a Pap test every three years (unless you have an abnormal result and your provider advises testing more often).      If you get Pap tests with HPV test, you only need to test every 5 years, unless you have an abnormal result. You do not need a Pap test if your uterus was removed (hysterectomy) and you have not had cancer.      You should be tested each year for STDs (sexually transmitted diseases), if you're at risk.     Ask your doctor if you should have a mammogram.      Have a colonoscopy (test for colon cancer) if someone in your family has had colon cancer or polyps before age 50.       Have a cholesterol test every 5 years.       Have a diabetes test (fasting glucose) after age 45. If you are at risk for diabetes, you should have this test every 3 years.    Shots: Get a flu shot each year. Get a tetanus shot every 10 years.     Nutrition:     Eat at least 5 servings of fruits and vegetables each day.    Eat whole-grain bread, whole-wheat pasta and brown rice instead of white grains and rice.    Get adequate Calcium and Vitamin D.      Lifestyle    Exercise at least 150 minutes a week (an average of 30 minutes a day, 5 days a week). This will help you control your weight and prevent disease.    Limit alcohol to one drink per day.    No smoking.     Wear sunscreen to prevent skin cancer.    See your dentist every six months for an exam and cleaning.

## 2018-12-31 DIAGNOSIS — J45.30 MILD PERSISTENT ASTHMA WITHOUT COMPLICATION: ICD-10-CM

## 2018-12-31 NOTE — TELEPHONE ENCOUNTER
ADVAIR DISKUS 250-50 MCG/DOSE inhaler     Next 5 appointments (look out 90 days)    Jan 04, 2019  1:00 PM CST  PHYSICAL with Fabiana Ramos MD  [facility] [place] ([facility] [place] [address]  [place] [id number] [phone]

## 2019-01-03 NOTE — TELEPHONE ENCOUNTER
2nd Attempt LVM for Kami to call back to schedule her yearly follow up appointment in endocrinology. I asked patient to call [phone] or if she would like to continue to follow up with Dr Jacobs she would need to see him down at the [facility] [place] and the phone number to call to schedule that appointment would be [phone].    Richard Pacheco  Procedure , [place]  Peds Specialty and Adult Endocrinology

## 2019-01-04 ENCOUNTER — OFFICE VISIT (OUTPATIENT)
Dept: FAMILY MEDICINE | Facility: CLINIC | Age: 49
End: 2019-01-04
Payer: COMMERCIAL

## 2019-01-04 VITALS
OXYGEN SATURATION: 97 % | DIASTOLIC BLOOD PRESSURE: 80 MMHG | BODY MASS INDEX: 38.54 KG/M2 | HEIGHT: 66 IN | WEIGHT: 239.8 LBS | SYSTOLIC BLOOD PRESSURE: 108 MMHG | TEMPERATURE: 99.4 F | HEART RATE: 79 BPM | RESPIRATION RATE: 18 BRPM

## 2019-01-04 DIAGNOSIS — Z13.220 LIPID SCREENING: ICD-10-CM

## 2019-01-04 DIAGNOSIS — E89.0 S/P COMPLETE THYROIDECTOMY: ICD-10-CM

## 2019-01-04 DIAGNOSIS — J45.30 MILD PERSISTENT ASTHMA WITHOUT COMPLICATION: ICD-10-CM

## 2019-01-04 DIAGNOSIS — Z00.00 ENCOUNTER FOR ROUTINE ADULT HEALTH EXAMINATION WITHOUT ABNORMAL FINDINGS: Primary | ICD-10-CM

## 2019-01-04 DIAGNOSIS — C73 MALIGNANT NEOPLASM OF THYROID GLAND (H): ICD-10-CM

## 2019-01-04 DIAGNOSIS — Z79.899 ENCOUNTER FOR LONG-TERM (CURRENT) USE OF MEDICATIONS: ICD-10-CM

## 2019-01-04 DIAGNOSIS — K64.4 EXTERNAL HEMORRHOIDS: ICD-10-CM

## 2019-01-04 DIAGNOSIS — E89.0 POSTOPERATIVE HYPOTHYROIDISM: ICD-10-CM

## 2019-01-04 DIAGNOSIS — E83.51 HYPOCALCEMIA: ICD-10-CM

## 2019-01-04 DIAGNOSIS — Z13.1 SCREENING FOR DIABETES MELLITUS: ICD-10-CM

## 2019-01-04 DIAGNOSIS — E89.2 POSTSURGICAL HYPOPARATHYROIDISM (H): ICD-10-CM

## 2019-01-04 LAB
ALBUMIN SERPL-MCNC: 3.5 G/DL (ref 3.4–5)
ALP SERPL-CCNC: 98 U/L (ref 40–150)
ALT SERPL W P-5'-P-CCNC: 26 U/L (ref 0–50)
ANION GAP SERPL CALCULATED.3IONS-SCNC: 4 MMOL/L (ref 3–14)
AST SERPL W P-5'-P-CCNC: 19 U/L (ref 0–45)
BILIRUB SERPL-MCNC: 0.3 MG/DL (ref 0.2–1.3)
BUN SERPL-MCNC: 8 MG/DL (ref 7–30)
CALCIUM SERPL-MCNC: 8.5 MG/DL (ref 8.5–10.1)
CHLORIDE SERPL-SCNC: 103 MMOL/L (ref 94–109)
CHOLEST SERPL-MCNC: 202 MG/DL
CO2 SERPL-SCNC: 32 MMOL/L (ref 20–32)
CREAT SERPL-MCNC: 1.11 MG/DL (ref 0.52–1.04)
GFR SERPL CREATININE-BSD FRML MDRD: 58 ML/MIN/{1.73_M2}
GLUCOSE SERPL-MCNC: 89 MG/DL (ref 70–99)
HDLC SERPL-MCNC: 44 MG/DL
LDLC SERPL CALC-MCNC: 127 MG/DL
NONHDLC SERPL-MCNC: 158 MG/DL
PHOSPHATE SERPL-MCNC: 4.2 MG/DL (ref 2.5–4.5)
POTASSIUM SERPL-SCNC: 3.9 MMOL/L (ref 3.4–5.3)
PROT SERPL-MCNC: 7.6 G/DL (ref 6.8–8.8)
SODIUM SERPL-SCNC: 139 MMOL/L (ref 133–144)
T4 FREE SERPL-MCNC: 1.32 NG/DL (ref 0.76–1.46)
TRIGL SERPL-MCNC: 155 MG/DL
TSH SERPL DL<=0.005 MIU/L-ACNC: 0.72 MU/L (ref 0.4–4)

## 2019-01-04 PROCEDURE — 84100 ASSAY OF PHOSPHORUS: CPT | Performed by: FAMILY MEDICINE

## 2019-01-04 PROCEDURE — 99213 OFFICE O/P EST LOW 20 MIN: CPT | Mod: 25 | Performed by: FAMILY MEDICINE

## 2019-01-04 PROCEDURE — 80061 LIPID PANEL: CPT | Performed by: FAMILY MEDICINE

## 2019-01-04 PROCEDURE — 80053 COMPREHEN METABOLIC PANEL: CPT | Performed by: FAMILY MEDICINE

## 2019-01-04 PROCEDURE — 36415 COLL VENOUS BLD VENIPUNCTURE: CPT | Performed by: FAMILY MEDICINE

## 2019-01-04 PROCEDURE — 99396 PREV VISIT EST AGE 40-64: CPT | Performed by: FAMILY MEDICINE

## 2019-01-04 PROCEDURE — 84443 ASSAY THYROID STIM HORMONE: CPT | Performed by: FAMILY MEDICINE

## 2019-01-04 PROCEDURE — 84439 ASSAY OF FREE THYROXINE: CPT | Performed by: FAMILY MEDICINE

## 2019-01-04 RX ORDER — ALBUTEROL SULFATE 90 UG/1
2 AEROSOL, METERED RESPIRATORY (INHALATION) EVERY 4 HOURS PRN
Qty: 1 INHALER | Refills: 6 | Status: SHIPPED | OUTPATIENT
Start: 2019-01-04 | End: 2019-12-18

## 2019-01-04 RX ORDER — MONTELUKAST SODIUM 10 MG/1
TABLET ORAL
Qty: 90 TABLET | Refills: 3 | Status: SHIPPED | OUTPATIENT
Start: 2019-01-04 | End: 2020-01-22

## 2019-01-04 RX ORDER — CALCITRIOL 0.25 UG/1
0.25 CAPSULE, LIQUID FILLED ORAL 2 TIMES DAILY
Qty: 180 CAPSULE | Refills: 3 | Status: SHIPPED | OUTPATIENT
Start: 2019-01-04 | End: 2020-03-03

## 2019-01-04 RX ORDER — PREDNISONE 20 MG/1
20 TABLET ORAL DAILY
Qty: 5 TABLET | Refills: 0 | Status: SHIPPED | OUTPATIENT
Start: 2019-01-04 | End: 2019-01-11

## 2019-01-04 RX ORDER — OMEPRAZOLE 10 MG/1
20 CAPSULE, DELAYED RELEASE ORAL EVERY OTHER DAY
COMMUNITY

## 2019-01-04 ASSESSMENT — ENCOUNTER SYMPTOMS
CHILLS: 0
FEVER: 0
FREQUENCY: 0
NAUSEA: 0
DIARRHEA: 0
SORE THROAT: 0
DYSURIA: 0
MYALGIAS: 0
HEARTBURN: 1
NERVOUS/ANXIOUS: 0
WEAKNESS: 0
DIZZINESS: 0
HEMATOCHEZIA: 0
CONSTIPATION: 0
HEADACHES: 0
PARESTHESIAS: 0
PALPITATIONS: 0
ABDOMINAL PAIN: 0
ARTHRALGIAS: 0
SHORTNESS OF BREATH: 0
EYE PAIN: 0
COUGH: 1
BREAST MASS: 0
JOINT SWELLING: 0
HEMATURIA: 0

## 2019-01-04 ASSESSMENT — MIFFLIN-ST. JEOR: SCORE: 1727.04

## 2019-01-04 ASSESSMENT — PAIN SCALES - GENERAL: PAINLEVEL: NO PAIN (0)

## 2019-01-05 ASSESSMENT — ASTHMA QUESTIONNAIRES: ACT_TOTALSCORE: 23

## 2019-01-07 DIAGNOSIS — R79.89 ELEVATED SERUM CREATININE: Primary | ICD-10-CM

## 2019-01-09 NOTE — TELEPHONE ENCOUNTER
3rd Attempt Letter sent to patient requesting a call back to schedule her follow up appointment with an Endocrinologist. I asked her to call [phone] or if she would like to continue to see Dr Jacobs to call [phone].    Richard Pacheco  Procedure , [place]  Peds Specialty and Adult Endocrinology

## 2019-03-18 ENCOUNTER — ANCILLARY PROCEDURE (OUTPATIENT)
Dept: GENERAL RADIOLOGY | Facility: CLINIC | Age: 49
End: 2019-03-18
Attending: PHYSICIAN ASSISTANT
Payer: COMMERCIAL

## 2019-03-18 ENCOUNTER — OFFICE VISIT (OUTPATIENT)
Dept: FAMILY MEDICINE | Facility: CLINIC | Age: 49
End: 2019-03-18
Payer: COMMERCIAL

## 2019-03-18 VITALS
BODY MASS INDEX: 39.18 KG/M2 | SYSTOLIC BLOOD PRESSURE: 106 MMHG | TEMPERATURE: 97.4 F | WEIGHT: 241.6 LBS | DIASTOLIC BLOOD PRESSURE: 74 MMHG | OXYGEN SATURATION: 96 % | HEART RATE: 100 BPM | RESPIRATION RATE: 22 BRPM

## 2019-03-18 DIAGNOSIS — J45.30 MILD PERSISTENT ASTHMA WITHOUT COMPLICATION: ICD-10-CM

## 2019-03-18 DIAGNOSIS — R05.9 COUGH: ICD-10-CM

## 2019-03-18 DIAGNOSIS — R05.9 COUGH: Primary | ICD-10-CM

## 2019-03-18 DIAGNOSIS — J20.9 ACUTE BRONCHITIS WITH COEXISTING CONDITION REQUIRING PROPHYLACTIC TREATMENT: ICD-10-CM

## 2019-03-18 PROCEDURE — 99214 OFFICE O/P EST MOD 30 MIN: CPT | Performed by: PHYSICIAN ASSISTANT

## 2019-03-18 PROCEDURE — 71046 X-RAY EXAM CHEST 2 VIEWS: CPT | Mod: FY

## 2019-03-18 RX ORDER — PREDNISONE 20 MG/1
20 TABLET ORAL DAILY
Qty: 7 TABLET | Refills: 0 | Status: SHIPPED | OUTPATIENT
Start: 2019-03-18 | End: 2019-06-24

## 2019-03-18 RX ORDER — AZITHROMYCIN 250 MG/1
TABLET, FILM COATED ORAL
Qty: 6 TABLET | Refills: 0 | Status: SHIPPED | OUTPATIENT
Start: 2019-03-18 | End: 2019-06-24

## 2019-03-18 ASSESSMENT — PAIN SCALES - GENERAL: PAINLEVEL: MODERATE PAIN (5)

## 2019-03-18 NOTE — PROGRESS NOTES
SUBJECTIVE:   Kami Natarajan is a 49 year old female who presents to clinic today for the following health issues:      HPI  Acute Illness   Acute illness concerns: URI- cough   Onset: Saturday     Fever: no    Chills/Sweats: YES sweats     Headache (location?): YES    Sinus Pressure:YES    Conjunctivitis:  no    Ear Pain: no    Rhinorrhea: YES    Congestion: YES    Sore Throat: no   Chest tightness and burning with cough.   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     Problem list and histories reviewed & adjusted, as indicated.  Additional history: as documented    Patient is here in clinic with acute onset of cough, chills and body aches. 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     BP Readings from Last 3 Encounters:   03/18/19 106/74   01/04/19 108/80   01/22/18 116/83    Wt Readings from Last 3 Encounters:   03/18/19 109.6 kg (241 lb 9.6 oz)   01/04/19 108.8 kg (239 lb 12.8 oz)   01/22/18 107.8 kg (237 lb 10.5 oz)           ROS:  Constitutional, HEENT, cardiovascular, pulmonary, gi and gu systems are negative, except as otherwise noted.    OBJECTIVE:     /74   Pulse 100   Temp 97.4  F (36.3  C) (Temporal)   Resp 22   Wt 109.6 kg (241 lb 9.6 oz)   SpO2 96%   BMI 39.18 kg/m    Body mass index is 39.18 kg/m .  GENERAL: alert and no distress  EYES: Eyes grossly normal to inspection  HENT: normal cephalic/atraumatic, both ears: clear effusion, nose and mouth without ulcers or lesions, rhinorrhea clear, oropharynx clear and oral mucous membranes moist  NECK: no adenopathy, no asymmetry, masses, or scars  and thyroid normal to palpation  RESP: expiratory wheezes bilateral and bronchial breath sounds on the left   CV: regular rates and rhythm, no murmur, click or rub, peripheral pulses strong and no peripheral edema  MS: no gross musculoskeletal defects noted, no edema  SKIN: no suspicious lesions or rashes    Diagnostic Test Results:  CXR: pending    ASSESSMENT/PLAN:     Asthma: Mild persistent with exacerbation   Plan:  No changes in the patient's current treatment plan      ICD-10-CM    1. Cough R05 XR Chest 2 Views   2. Acute bronchitis with coexisting condition requiring prophylactic treatment J20.9 azithromycin (ZITHROMAX) 250 MG tablet     predniSONE (DELTASONE) 20 MG tablet   3. Mild persistent asthma without complication J45.30 predniSONE (DELTASONE) 20 MG tablet       I will treat for bronchitis and an asthma exacerbation and would have her follow up if new or worsening symptoms or if not improving with treatment.   See Patient Instructions    Elvia Lorenz PA-C  [facility]

## 2019-03-18 NOTE — PATIENT INSTRUCTIONS

## 2019-06-24 ENCOUNTER — ANCILLARY PROCEDURE (OUTPATIENT)
Dept: ULTRASOUND IMAGING | Facility: CLINIC | Age: 49
End: 2019-06-24
Attending: INTERNAL MEDICINE
Payer: COMMERCIAL

## 2019-06-24 ENCOUNTER — OFFICE VISIT (OUTPATIENT)
Dept: ENDOCRINOLOGY | Facility: CLINIC | Age: 49
End: 2019-06-24
Payer: COMMERCIAL

## 2019-06-24 VITALS
DIASTOLIC BLOOD PRESSURE: 76 MMHG | HEART RATE: 90 BPM | HEIGHT: 66 IN | OXYGEN SATURATION: 97 % | SYSTOLIC BLOOD PRESSURE: 114 MMHG | WEIGHT: 244.49 LBS | BODY MASS INDEX: 39.29 KG/M2

## 2019-06-24 DIAGNOSIS — Z85.850 HX OF PAPILLARY THYROID CARCINOMA: Primary | ICD-10-CM

## 2019-06-24 DIAGNOSIS — E66.09 CLASS 2 OBESITY DUE TO EXCESS CALORIES WITHOUT SERIOUS COMORBIDITY WITH BODY MASS INDEX (BMI) OF 39.0 TO 39.9 IN ADULT: ICD-10-CM

## 2019-06-24 DIAGNOSIS — E66.812 CLASS 2 OBESITY DUE TO EXCESS CALORIES WITHOUT SERIOUS COMORBIDITY WITH BODY MASS INDEX (BMI) OF 39.0 TO 39.9 IN ADULT: ICD-10-CM

## 2019-06-24 DIAGNOSIS — E20.89 OTHER HYPOPARATHYROIDISM (H): ICD-10-CM

## 2019-06-24 DIAGNOSIS — Z85.850 HX OF PAPILLARY THYROID CARCINOMA: ICD-10-CM

## 2019-06-24 DIAGNOSIS — E83.51 HYPOCALCEMIA: ICD-10-CM

## 2019-06-24 DIAGNOSIS — E89.0 POSTOPERATIVE HYPOTHYROIDISM: ICD-10-CM

## 2019-06-24 DIAGNOSIS — E89.0 S/P COMPLETE THYROIDECTOMY: ICD-10-CM

## 2019-06-24 LAB
ALBUMIN SERPL-MCNC: 3.9 G/DL (ref 3.4–5)
ALP SERPL-CCNC: 93 U/L (ref 40–150)
BUN SERPL-MCNC: 10 MG/DL (ref 7–30)
CALCIUM SERPL-MCNC: 8.6 MG/DL (ref 8.5–10.1)
CREAT SERPL-MCNC: 0.98 MG/DL (ref 0.52–1.04)
GFR SERPL CREATININE-BSD FRML MDRD: 67 ML/MIN/{1.73_M2}
PHOSPHATE SERPL-MCNC: 3.7 MG/DL (ref 2.5–4.5)
T4 FREE SERPL-MCNC: 1.53 NG/DL (ref 0.76–1.46)
TSH SERPL DL<=0.005 MIU/L-ACNC: 0.66 MU/L (ref 0.4–4)

## 2019-06-24 PROCEDURE — 99000 SPECIMEN HANDLING OFFICE-LAB: CPT | Performed by: INTERNAL MEDICINE

## 2019-06-24 PROCEDURE — 86800 THYROGLOBULIN ANTIBODY: CPT | Mod: 90 | Performed by: INTERNAL MEDICINE

## 2019-06-24 PROCEDURE — 84520 ASSAY OF UREA NITROGEN: CPT | Performed by: INTERNAL MEDICINE

## 2019-06-24 PROCEDURE — 84432 ASSAY OF THYROGLOBULIN: CPT | Mod: 90 | Performed by: INTERNAL MEDICINE

## 2019-06-24 PROCEDURE — 84075 ASSAY ALKALINE PHOSPHATASE: CPT | Performed by: INTERNAL MEDICINE

## 2019-06-24 PROCEDURE — 00000344 ZZHCL STATISTIC REMEASURE THYROGLOBULIN: Mod: 90 | Performed by: INTERNAL MEDICINE

## 2019-06-24 PROCEDURE — 82306 VITAMIN D 25 HYDROXY: CPT | Performed by: INTERNAL MEDICINE

## 2019-06-24 PROCEDURE — 84439 ASSAY OF FREE THYROXINE: CPT | Performed by: INTERNAL MEDICINE

## 2019-06-24 PROCEDURE — 82565 ASSAY OF CREATININE: CPT | Performed by: INTERNAL MEDICINE

## 2019-06-24 PROCEDURE — 82310 ASSAY OF CALCIUM: CPT | Performed by: INTERNAL MEDICINE

## 2019-06-24 PROCEDURE — 36415 COLL VENOUS BLD VENIPUNCTURE: CPT | Performed by: INTERNAL MEDICINE

## 2019-06-24 PROCEDURE — 76536 US EXAM OF HEAD AND NECK: CPT | Performed by: STUDENT IN AN ORGANIZED HEALTH CARE EDUCATION/TRAINING PROGRAM

## 2019-06-24 PROCEDURE — 99215 OFFICE O/P EST HI 40 MIN: CPT | Performed by: INTERNAL MEDICINE

## 2019-06-24 PROCEDURE — 84443 ASSAY THYROID STIM HORMONE: CPT | Performed by: INTERNAL MEDICINE

## 2019-06-24 PROCEDURE — 82040 ASSAY OF SERUM ALBUMIN: CPT | Performed by: INTERNAL MEDICINE

## 2019-06-24 PROCEDURE — 84100 ASSAY OF PHOSPHORUS: CPT | Performed by: INTERNAL MEDICINE

## 2019-06-24 RX ORDER — LEVOTHYROXINE SODIUM 200 UG/1
200 TABLET ORAL DAILY
Qty: 90 TABLET | Refills: 3 | Status: SHIPPED | OUTPATIENT
Start: 2019-06-24 | End: 2020-07-06

## 2019-06-24 ASSESSMENT — MIFFLIN-ST. JEOR: SCORE: 1749.72

## 2019-06-24 NOTE — LETTER
"    6/24/2019         RE: Kami Natarajan  [address] [place] [address]        Dear Colleague,    Thank you for referring your patient, Kami Natarajan, to the [facility]. Please see a copy of my visit note below.    Endocrinology Clinic Visit    Chief Complaint: RECHECK (Thyroid Cancer)     Information obtained from:Patient    Subjective:         HPI: Kami Natarajan is a 49 year old female with history of papillary thyroid carcinoma with follicular variant who is here for follow-up of the same.      She was diagnosed with papillary thyroid cancer in 2012 and underwent total thyroidectomy on 8/8/2012.  The full pathology report is documented below.  Pathology was well-differentiated invasive papillary carcinoma, follicular variant with a 2.1 cm on the left side and 0.8 cm on the right side.  There was lymphovascular invasion bilaterally.  \"A.  Thyroid, left lobe and isthmus, thyroidectomy:       -  Well-differentiated invasive papillary carcinoma, follicular  variant.       -  Tumor size:  2.1 cm.       -  Tumor is confined to the thyroid and extends to 0.2 cm to the  closest resection margin.       -  Lymphovascular invasion .       -  Two hyperplastic nodules, 0.4 and 0.3 cm in diameter.    B.  Thyroid, right lobe, thyroidectomy:       -  Well-differentiated invasive papillary thyroid carcinoma,  follicular variant.       -  Tumor size:  0.8 cm.       -  Tumor is confined to the thyroid and is 0.1 cm from the nearest  surgical margin.       -  No evidence of lymphovascular invasion.       -  Hyperplastic nodule, 0.2 cm in diameter.  C.  Lymph node and parathyroid, left neck, excision:       -  No evidence of malignancy in three lymph nodes (0/3).       -  Benign parathyroid tissue.  D.  Lymph node, right neck, excision:       -  No evidence of malignancy in one lymph node (0/1).\"    She underwent radioiodine ablation with 131 mCi of I-131 on 1/24/2013.  Post scan showed 2 foci of uptake in the " anterior neck which was consistent with residual thyroid tissue.  There was no significant uptake in other parts of the body.    Over the years she has had a thyroglobulin level which was undetectable.  Neck ultrasound also did not show any worrisome lymphadenopathy.    She reports that she is currently on levothyroxine 200 mcg daily.  Reports complaints except missing 2 doses in the month of May 2019.  Denies hyper or hypothyroid symptoms including palpitation, GI symptoms, tremor, weight loss or hair/ skin changes.    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  She is currently on calcium 1200 mg twice daily and also calcitriol 0.25 mg twice daily.  Her calcium labs over the years are documented below.    She does not take vitamin D supplement.  Denies any numbness or tingling.  Denies any muscle cramps.  Body mass index is 39.16 kg/m .  Reports that she is physically active at home.  She drinks sometimes 4 soda drinks per day.     No Known Allergies    Current Outpatient Medications   Medication Sig Dispense Refill     albuterol (PROAIR HFA/PROVENTIL HFA/VENTOLIN HFA) 108 (90 Base) MCG/ACT inhaler Inhale 2 puffs into the lungs every 4 hours as needed for shortness of breath / dyspnea 1 Inhaler 6     calcitRIOL (ROCALTROL) 0.25 MCG capsule Take 1 capsule (0.25 mcg) by mouth 2 times daily 180 capsule 3     calcium 600 MG tablet Take 2 tablets by mouth 2 times daily       Fluticasone Propionate (FLONASE NA) Spray 1 spray in nostril daily       fluticasone-salmeterol (ADVAIR DISKUS) 250-50 MCG/DOSE inhaler INHALE ONE PUFF BY MOUTH TWICE DAILY 3 Inhaler 3     hydrocortisone (ANUSOL-HC) 2.5 % cream Place 1 g rectally 2 times daily as needed for hemorrhoids 30 g 5     levonorgestrel (MIRENA, 52 MG,) 20 MCG/24HR IUD 1 each by Intrauterine route once       levothyroxine (SYNTHROID/LEVOTHROID) 200 MCG tablet TAKE ONE TABLET BY MOUTH ONE TIME DAILY  90 tablet 0     loratadine  (CLARITIN) 10 MG tablet Take 10 mg by mouth daily       montelukast (SINGULAIR) 10 MG tablet Take 1 tablet (10 mg) by mouth daily 90 tablet 3     omeprazole (PRILOSEC) 10 MG DR capsule Take 20 mg by mouth every other day       ORDER FOR DME [id number]  Plantar fasciits,ngt  Splt,lg   1 each 0       Review of Systems     10 point review system (Constitutional, HENT, Eyes, Respiratory, Cardiovascular, Gastrointestinal, Genitourinary, Musculoskeletal,Neurological, Psychiatric/Behavioural, Endocrine) is negative or is as per HPI above    Past Medical History:   Diagnosis Date     Alpha-1-antitrypsin deficiency (H)      Asthma     not hospitalized for this     Mild persistent asthma        Past Surgical History:   Procedure Laterality Date     DILATION AND CURETTAGE, HYSTEROSCOPY DIAGNOSTIC, COMBINED N/A 07/13/2017     DILATION AND CURETTAGE, OPERATIVE HYSTEROSCOPY, COMBINED N/A 7/13/2017    Procedure: COMBINED DILATION AND CURETTAGE, OPERATIVE HYSTEROSCOPY;  Hysteroscopy, D&C, Mirena insertion;  Surgeon: Sierra Lane DO;  Location: [facility] OR     HERNIA REPAIR, INGUINAL RT/LT  infant     INSERT INTRAUTERINE DEVICE N/A 7/13/2017    Procedure: INSERT INTRAUTERINE DEVICE;;  Surgeon: Sierra Lane DO;  Location: [facility] OR     THYROIDECTOMY  8/8/2012    Procedure: THYROIDECTOMY;  Total Thyroidectomy and Central Neck Dissection;  Surgeon: Philomena Ruiz MD;  Location: [facility] OR       Family History   Problem Relation Age of Onset     Asthma Father      Cancer Father         cirrhosis and liver cancer     Genetic Disorder Father         alpha 1 antitrypsin deficiency     Thyroid Disease Mother         also on blood thinner. unclear history   Family history of antitrypsin deficiency and she was found to be a carrier for it.  Her last social in January 2018 reveals a stable liver enzymes.    Social History     Socioeconomic History     Marital status:      Spouse name: None     Number of children: 3     Years  "of education: None     Highest education level: None   Occupational History     Employer: [employer]   Social Needs     Financial resource strain: None     Food insecurity:     Worry: None     Inability: None     Transportation needs:     Medical: None     Non-medical: None   Tobacco Use     Smoking status: Former Smoker     Smokeless tobacco: Never Used   Substance and Sexual Activity     Alcohol use: Yes     Comment: occasionally     Drug use: No     Sexual activity: Yes     Partners: Male     Birth control/protection: Surgical     Comment: vasectomy   Lifestyle     Physical activity:     Days per week: None     Minutes per session: None     Stress: None   Relationships     Social connections:     Talks on phone: None     Gets together: None     Attends Muslim service: None     Active member of club or organization: None     Attends meetings of clubs or organizations: None     Relationship status: None     Intimate partner violence:     Fear of current or ex partner: None     Emotionally abused: None     Physically abused: None     Forced sexual activity: None   Other Topics Concern     Parent/sibling w/ CABG, MI or angioplasty before 65F 55M? No   Social History Narrative     None       Objective:   /76   Pulse 90   Ht 1.683 m (5' 6.25\")   Wt 110.9 kg (244 lb 7.8 oz)   SpO2 97%   BMI 39.16 kg/m     Constitutional: Pleasant no acute cardiopulmonary distress.   EYES: anicteric, normal extra-ocular movements, no lid lag or retraction, pupils are equal and reactive to light bilaterally.  HEENT: Mouth/Throat: Mucous membrane is moist. Oropharynx is clear.  Thyroid examination: Thyroid surgically absent.  No palpable lymphadenopathy.  Cardiovascular: RRR, S1, S2 normal.   Pulmonary/Chest: CTAB. No wheezing or rales.   Abdominal: +BS. Non tender to palpation.     Neurological: Alert and oriented.  No tremor and reflexes are symmetrical bilaterally and within the normal limits. Muscle strength 5/5. "   Extremities: No edema.  Psychological: appropriate mood and affect     In House Labs:   Lab Results   Component Value Date    A1C 5.3 11/05/2014       TSH   Date Value Ref Range Status   01/04/2019 0.72 0.40 - 4.00 mU/L Final   01/15/2018 2.14 0.40 - 4.00 mU/L Final   11/21/2016 1.19 0.40 - 4.00 mU/L Final   05/09/2016 0.26 (L) 0.40 - 4.00 mU/L Final   12/18/2015 31.96 (H) 0.40 - 4.00 mU/L Final     T4 Free   Date Value Ref Range Status   01/04/2019 1.32 0.76 - 1.46 ng/dL Final   01/15/2018 1.48 (H) 0.76 - 1.46 ng/dL Final   11/21/2016 1.36 0.76 - 1.46 ng/dL Final   05/09/2016 1.44 0.76 - 1.46 ng/dL Final   11/10/2015 1.93 (H) 0.76 - 1.46 ng/dL Final       Creatinine   Date Value Ref Range Status   01/04/2019 1.11 (H) 0.52 - 1.04 mg/dL Final   ]    ENDO CALCIUM LABS-[facility] Latest Ref Rng & Units 1/4/2019 1/15/2018   CALCIUM 8.5 - 10.1 mg/dL 8.5 8.3 (L)   PHOSPHOROUS 2.5 - 4.5 mg/dL 4.2 3.6   ALBUMIN 3.4 - 5.0 g/dL 3.5 3.8   BUN 7 - 30 mg/dL 8 9   CREATININE 0.52 - 1.04 mg/dL 1.11 (H) 1.02   PARATHYROID HORMONE INTACT 12 - 72 pg/mL       ENDO CALCIUM LABS[facility] Latest Ref Rng & Units 7/10/2017 4/14/2017   CALCIUM 8.5 - 10.1 mg/dL 9.1 9.1   PHOSPHOROUS 2.5 - 4.5 mg/dL     ALBUMIN 3.4 - 5.0 g/dL     BUN 7 - 30 mg/dL 11 11   CREATININE 0.52 - 1.04 mg/dL 1.03 0.94   PARATHYROID HORMONE INTACT 12 - 72 pg/mL       ENDO CALCIUM LABS-[facility] Latest Ref Rng & Units 11/21/2016 5/9/2016   CALCIUM 8.5 - 10.1 mg/dL 8.1 (L) 8.3 (L)   PHOSPHOROUS 2.5 - 4.5 mg/dL 3.7    ALBUMIN 3.4 - 5.0 g/dL 3.8    BUN 7 - 30 mg/dL     CREATININE 0.52 - 1.04 mg/dL     PARATHYROID HORMONE INTACT 12 - 72 pg/mL 7 (L)          Assessment/Treatment Plan:      History of papillary thyroid carcinoma; follicular variant: diagnosed with papillary thyroid cancer in 2012 and underwent total thyroidectomy on 8/8/2012.  Pathology was well-differentiated invasive papillary carcinoma, follicular variant with a 2.1 cm on the left side and 0.8 cm on the right side.  There  was lymphovascular invasion bilaterally. She underwent radioiodine ablation with 131 mCi of I-131 on 1/24/2013.  Post scan showed 2 foci of uptake in the anterior neck which was consistent with residual thyroid tissue.  There was no significant uptake in other parts of the body.  Thyroglobulin level has been undetectable after surgery.  Unremarkable neck ultrasound finding-last done in 2015.    Check TSH    -Check thyroglobulin level    Neck ultrasound to assess lymph nodes    Continue levothyroxine  Repeated ultrasound of the time of her visit today.  I have personally reviewed the ultrasound and I agree with the findings which are documented below.  Right:  Level 2: No lymph node  Level 3: 1.3 x 0.9 x 0.3 cm  Level 4: 0.6 x 0.6 x 0.3 cm and 0.9 x 0.9 x 0.5 cm.    Level 5: No lymph node  Level 6: No lymph node  Level 7: No lymph node  Left:  Level 2: 1.0 x 1.0 x 0.7 cm and 1.0 x 1.0 x 0.3 cm  Level 3: 0.9 x 0.6 x 0.3 cm and 0.7 x 0.7 x 0.4 cm  Level 4: 1.5 x 1.1 x 0.6 cm -interval increase in size of this nodule compared to the ultrasound which was done in 2015; however morphologically normal lymph node findings.  Level 5: 0.6 x 0.7 x 0.5 cm  Level 6: No lymph node  Level 7: No lymph node                                                               IMPRESSION:  Soft tissue neck ultrasound with lymph node measurements  as described above.  No morphologically abnormal lymph nodes.     ENDO THYROID LABS[facility] Latest Ref Rng & Units 6/24/2019   TSH 0.40 - 4.00 mU/L 0.66   TSH within the target range.  Continue levothyroxine without any change.     Postsurgical hypothyroidism: TSH within the target range.  Continue levothyroxine without any change.  Refilled for a year.  TSH in 6 months.    Hyporcalcemia secondary to hypoparathyroidism: Discussed in detail goals of therapy including calcium level of-in the low normal range.  Check calcium labs including vitamin D and adjust dose based on results.  She is currently on  calcium 1200 mg twice daily and also calcitriol 0.25 mg twice daily.    ENDO CALCIUM LABS-[facility] Latest Ref Rng & Units 6/24/2019   CALCIUM 8.5 - 10.1 mg/dL 8.6   PHOSPHOROUS 2.5 - 4.5 mg/dL 3.7   ALBUMIN 3.4 - 5.0 g/dL 3.9   BUN 7 - 30 mg/dL 10   CREATININE 0.52 - 1.04 mg/dL 0.98   PARATHYROID HORMONE INTACT 12 - 72 pg/mL    ALKPHOS 40 - 150 U/L 93   Labs within the normal limits.  Continue current management.    Obesity Body mass index is 39.16 kg/m .  Counseling provided regarding this issue.  The following written instructions were also provided.  Particularly our discussion focused on: Setting a goal, journaling, reduced calorie meal plan, avoiding/reducing soda, increasing fruits and vegetables in the diet and incorporating regular exercise to her regimen.    Patient Instructions   Caloric restriction, portion control and physical activity are evidence based strategies for life style changes. Here is a starting point in your case:       Reduced calorie meal plan; Goal is to lose 5-10% weight in the next six months.     Please keep a food journal of what you eat, calories in what you eat, and bring the journal with you to your next appointment.    Consider using applications for smart phones such as [employer], [employer], [employer], LoseIt, Tap&Track, and RelaxM. To keep food journal and track your exercise.     Focus on wet volumetrics, meaning, eat more foods that are high in water and fiber such as fruits and vegetables in order to get that full feeling. These are also good for your overall health as well.    Progressively increase physical activity to 45 minutes, including combination of cardio & resistance training x 5 days weekly. Start at 10-15 minutes per day and progressively work towards this goal.     Some insurance covers programs like weight watchers and I encourage you to contact your insurance.  Some individuals find programs like weight watchers helpful in their journey to a healthy life  style.       Please call Jeyson Arias in December for labs.      I will contact the patient with the test results.  Return to clinic in 12 months.    Test and/or medications prescribed today:  Orders Placed This Encounter   Procedures     US head neck soft tissue     TSH     T4 free     Thyroglobulin and antibody     Vitamin D Deficiency (D3 Only)     Albumin level     Alkaline phosphatase     Calcium     Phosphorus     Urea nitrogen     Creatinine         Owen Handley MD  Staff Endocrinologist    [phone]  Division of Endocrinology and Diabetes            Again, thank you for allowing me to participate in the care of your patient.        Sincerely,        Owen Handley MD

## 2019-06-24 NOTE — PROGRESS NOTES
"Endocrinology Clinic Visit    Chief Complaint: RECHECK (Thyroid Cancer)     Information obtained from:Patient    Subjective:         HPI: Kami Natarajan is a 49 year old female with history of papillary thyroid carcinoma with follicular variant who is here for follow-up of the same.      She was diagnosed with papillary thyroid cancer in 2012 and underwent total thyroidectomy on 8/8/2012.  The full pathology report is documented below.  Pathology was well-differentiated invasive papillary carcinoma, follicular variant with a 2.1 cm on the left side and 0.8 cm on the right side.  There was lymphovascular invasion bilaterally.  \"A.  Thyroid, left lobe and isthmus, thyroidectomy:       -  Well-differentiated invasive papillary carcinoma, follicular  variant.       -  Tumor size:  2.1 cm.       -  Tumor is confined to the thyroid and extends to 0.2 cm to the  closest resection margin.       -  Lymphovascular invasion .       -  Two hyperplastic nodules, 0.4 and 0.3 cm in diameter.    B.  Thyroid, right lobe, thyroidectomy:       -  Well-differentiated invasive papillary thyroid carcinoma,  follicular variant.       -  Tumor size:  0.8 cm.       -  Tumor is confined to the thyroid and is 0.1 cm from the nearest  surgical margin.       -  No evidence of lymphovascular invasion.       -  Hyperplastic nodule, 0.2 cm in diameter.  C.  Lymph node and parathyroid, left neck, excision:       -  No evidence of malignancy in three lymph nodes (0/3).       -  Benign parathyroid tissue.  D.  Lymph node, right neck, excision:       -  No evidence of malignancy in one lymph node (0/1).\"    She underwent radioiodine ablation with 131 mCi of I-131 on 1/24/2013.  Post scan showed 2 foci of uptake in the anterior neck which was consistent with residual thyroid tissue.  There was no significant uptake in other parts of the body.    Over the years she has had a thyroglobulin level which was undetectable.  Neck ultrasound also did not show " any worrisome lymphadenopathy.    She reports that she is currently on levothyroxine 200 mcg daily.  Reports complaints except missing 2 doses in the month of May 2019.  Denies hyper or hypothyroid symptoms including palpitation, GI symptoms, tremor, weight loss or hair/ skin changes.    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  She is currently on calcium 1200 mg twice daily and also calcitriol 0.25 mg twice daily.  Her calcium labs over the years are documented below.    She does not take vitamin D supplement.  Denies any numbness or tingling.  Denies any muscle cramps.  Body mass index is 39.16 kg/m .  Reports that she is physically active at home.  She drinks sometimes 4 soda drinks per day.     No Known Allergies    Current Outpatient Medications   Medication Sig Dispense Refill     albuterol (PROAIR HFA/PROVENTIL HFA/VENTOLIN HFA) 108 (90 Base) MCG/ACT inhaler Inhale 2 puffs into the lungs every 4 hours as needed for shortness of breath / dyspnea 1 Inhaler 6     calcitRIOL (ROCALTROL) 0.25 MCG capsule Take 1 capsule (0.25 mcg) by mouth 2 times daily 180 capsule 3     calcium 600 MG tablet Take 2 tablets by mouth 2 times daily       Fluticasone Propionate (FLONASE NA) Spray 1 spray in nostril daily       fluticasone-salmeterol (ADVAIR DISKUS) 250-50 MCG/DOSE inhaler INHALE ONE PUFF BY MOUTH TWICE DAILY 3 Inhaler 3     hydrocortisone (ANUSOL-HC) 2.5 % cream Place 1 g rectally 2 times daily as needed for hemorrhoids 30 g 5     levonorgestrel (MIRENA, 52 MG,) 20 MCG/24HR IUD 1 each by Intrauterine route once       levothyroxine (SYNTHROID/LEVOTHROID) 200 MCG tablet TAKE ONE TABLET BY MOUTH ONE TIME DAILY  90 tablet 0     loratadine (CLARITIN) 10 MG tablet Take 10 mg by mouth daily       montelukast (SINGULAIR) 10 MG tablet Take 1 tablet (10 mg) by mouth daily 90 tablet 3     omeprazole (PRILOSEC) 10 MG DR capsule Take 20 mg by mouth every other day       ORDER FOR DME  [id number]  Plantar fasciits,ngt  Splt,lg   1 each 0       Review of Systems     10 point review system (Constitutional, HENT, Eyes, Respiratory, Cardiovascular, Gastrointestinal, Genitourinary, Musculoskeletal,Neurological, Psychiatric/Behavioural, Endocrine) is negative or is as per HPI above    Past Medical History:   Diagnosis Date     Alpha-1-antitrypsin deficiency (H)      Asthma     not hospitalized for this     Mild persistent asthma        Past Surgical History:   Procedure Laterality Date     DILATION AND CURETTAGE, HYSTEROSCOPY DIAGNOSTIC, COMBINED N/A 07/13/2017     DILATION AND CURETTAGE, OPERATIVE HYSTEROSCOPY, COMBINED N/A 7/13/2017    Procedure: COMBINED DILATION AND CURETTAGE, OPERATIVE HYSTEROSCOPY;  Hysteroscopy, D&C, Mirena insertion;  Surgeon: Sierra Lane DO;  Location: [facility] OR     HERNIA REPAIR, INGUINAL RT/LT  infant     INSERT INTRAUTERINE DEVICE N/A 7/13/2017    Procedure: INSERT INTRAUTERINE DEVICE;;  Surgeon: Sierra Lane DO;  Location: [facility] OR     THYROIDECTOMY  8/8/2012    Procedure: THYROIDECTOMY;  Total Thyroidectomy and Central Neck Dissection;  Surgeon: Philomena Ruiz MD;  Location: [facility] OR       Family History   Problem Relation Age of Onset     Asthma Father      Cancer Father         cirrhosis and liver cancer     Genetic Disorder Father         alpha 1 antitrypsin deficiency     Thyroid Disease Mother         also on blood thinner. unclear history   Family history of antitrypsin deficiency and she was found to be a carrier for it.  Her last social in January 2018 reveals a stable liver enzymes.    Social History     Socioeconomic History     Marital status:      Spouse name: None     Number of children: 3     Years of education: None     Highest education level: None   Occupational History     Employer: "[employer]   Social Needs     Financial resource strain: None     Food insecurity:     Worry: None     Inability: None     Transportation  "needs:     Medical: None     Non-medical: None   Tobacco Use     Smoking status: Former Smoker     Smokeless tobacco: Never Used   Substance and Sexual Activity     Alcohol use: Yes     Comment: occasionally     Drug use: No     Sexual activity: Yes     Partners: Male     Birth control/protection: Surgical     Comment: vasectomy   Lifestyle     Physical activity:     Days per week: None     Minutes per session: None     Stress: None   Relationships     Social connections:     Talks on phone: None     Gets together: None     Attends Jewish service: None     Active member of club or organization: None     Attends meetings of clubs or organizations: None     Relationship status: None     Intimate partner violence:     Fear of current or ex partner: None     Emotionally abused: None     Physically abused: None     Forced sexual activity: None   Other Topics Concern     Parent/sibling w/ CABG, MI or angioplasty before 65F 55M? No   Social History Narrative     None       Objective:   /76   Pulse 90   Ht 1.683 m (5' 6.25\")   Wt 110.9 kg (244 lb 7.8 oz)   SpO2 97%   BMI 39.16 kg/m    Constitutional: Pleasant no acute cardiopulmonary distress.   EYES: anicteric, normal extra-ocular movements, no lid lag or retraction, pupils are equal and reactive to light bilaterally.  HEENT: Mouth/Throat: Mucous membrane is moist. Oropharynx is clear.  Thyroid examination: Thyroid surgically absent.  No palpable lymphadenopathy.  Cardiovascular: RRR, S1, S2 normal.   Pulmonary/Chest: CTAB. No wheezing or rales.   Abdominal: +BS. Non tender to palpation.     Neurological: Alert and oriented.  No tremor and reflexes are symmetrical bilaterally and within the normal limits. Muscle strength 5/5.   Extremities: No edema.  Psychological: appropriate mood and affect     In House Labs:   Lab Results   Component Value Date    A1C 5.3 11/05/2014       TSH   Date Value Ref Range Status   01/04/2019 0.72 0.40 - 4.00 mU/L Final "   01/15/2018 2.14 0.40 - 4.00 mU/L Final   11/21/2016 1.19 0.40 - 4.00 mU/L Final   05/09/2016 0.26 (L) 0.40 - 4.00 mU/L Final   12/18/2015 31.96 (H) 0.40 - 4.00 mU/L Final     T4 Free   Date Value Ref Range Status   01/04/2019 1.32 0.76 - 1.46 ng/dL Final   01/15/2018 1.48 (H) 0.76 - 1.46 ng/dL Final   11/21/2016 1.36 0.76 - 1.46 ng/dL Final   05/09/2016 1.44 0.76 - 1.46 ng/dL Final   11/10/2015 1.93 (H) 0.76 - 1.46 ng/dL Final       Creatinine   Date Value Ref Range Status   01/04/2019 1.11 (H) 0.52 - 1.04 mg/dL Final   ]    [facility] Latest Ref Rng & Units 1/4/2019 1/15/2018   CALCIUM 8.5 - 10.1 mg/dL 8.5 8.3 (L)   PHOSPHOROUS 2.5 - 4.5 mg/dL 4.2 3.6   ALBUMIN 3.4 - 5.0 g/dL 3.5 3.8   BUN 7 - 30 mg/dL 8 9   CREATININE 0.52 - 1.04 mg/dL 1.11 (H) 1.02   PARATHYROID HORMONE INTACT 12 - 72 pg/mL       [facility] Latest Ref Rng & Units 7/10/2017 4/14/2017   CALCIUM 8.5 - 10.1 mg/dL 9.1 9.1   PHOSPHOROUS 2.5 - 4.5 mg/dL     ALBUMIN 3.4 - 5.0 g/dL     BUN 7 - 30 mg/dL 11 11   CREATININE 0.52 - 1.04 mg/dL 1.03 0.94   PARATHYROID HORMONE INTACT 12 - 72 pg/mL       [facility] Latest Ref Rng & Units 11/21/2016 5/9/2016   CALCIUM 8.5 - 10.1 mg/dL 8.1 (L) 8.3 (L)   PHOSPHOROUS 2.5 - 4.5 mg/dL 3.7    ALBUMIN 3.4 - 5.0 g/dL 3.8    BUN 7 - 30 mg/dL     CREATININE 0.52 - 1.04 mg/dL     PARATHYROID HORMONE INTACT 12 - 72 pg/mL 7 (L)          Assessment/Treatment Plan:      History of papillary thyroid carcinoma; follicular variant: diagnosed with papillary thyroid cancer in 2012 and underwent total thyroidectomy on 8/8/2012.  Pathology was well-differentiated invasive papillary carcinoma, follicular variant with a 2.1 cm on the left side and 0.8 cm on the right side.  There was lymphovascular invasion bilaterally. She underwent radioiodine ablation with 131 mCi of I-131 on 1/24/2013.  Post scan showed 2 foci of uptake in the anterior neck which was consistent with residual thyroid tissue.  There  was no significant uptake in other parts of the body.  Thyroglobulin level has been undetectable after surgery.  Unremarkable neck ultrasound finding-last done in 2015.    Check TSH    -Check thyroglobulin level    Neck ultrasound to assess lymph nodes    Continue levothyroxine  Repeated ultrasound of the time of her visit today.  I have personally reviewed the ultrasound and I agree with the findings which are documented below.  Right:  Level 2: No lymph node  Level 3: 1.3 x 0.9 x 0.3 cm  Level 4: 0.6 x 0.6 x 0.3 cm and 0.9 x 0.9 x 0.5 cm.    Level 5: No lymph node  Level 6: No lymph node  Level 7: No lymph node  Left:  Level 2: 1.0 x 1.0 x 0.7 cm and 1.0 x 1.0 x 0.3 cm  Level 3: 0.9 x 0.6 x 0.3 cm and 0.7 x 0.7 x 0.4 cm  Level 4: 1.5 x 1.1 x 0.6 cm -interval increase in size of this nodule compared to the ultrasound which was done in 2015; however morphologically normal lymph node findings.  Level 5: 0.6 x 0.7 x 0.5 cm  Level 6: No lymph node  Level 7: No lymph node                                                               IMPRESSION:  Soft tissue neck ultrasound with lymph node measurements  as described above.  No morphologically abnormal lymph nodes.     [facility] Latest Ref Rng & Units 6/24/2019   TSH 0.40 - 4.00 mU/L 0.66   TSH within the target range.  Continue levothyroxine without any change.     Postsurgical hypothyroidism: TSH within the target range.  Continue levothyroxine without any change.  Refilled for a year.  TSH in 6 months.    Hyporcalcemia secondary to hypoparathyroidism: Discussed in detail goals of therapy including calcium level of-in the low normal range.  Check calcium labs including vitamin D and adjust dose based on results.  She is currently on calcium 1200 mg twice daily and also calcitriol 0.25 mg twice daily.    [facility] Latest Ref Rng & Units 6/24/2019   CALCIUM 8.5 - 10.1 mg/dL 8.6   PHOSPHOROUS 2.5 - 4.5 mg/dL 3.7   ALBUMIN 3.4 - 5.0 g/dL 3.9   BUN 7  - 30 mg/dL 10   CREATININE 0.52 - 1.04 mg/dL 0.98   PARATHYROID HORMONE INTACT 12 - 72 pg/mL    ALKPHOS 40 - 150 U/L 93   Labs within the normal limits.  Continue current management.    Obesity Body mass index is 39.16 kg/m .  Counseling provided regarding this issue.  The following written instructions were also provided.  Particularly our discussion focused on: Setting a goal, journaling, reduced calorie meal plan, avoiding/reducing soda, increasing fruits and vegetables in the diet and incorporating regular exercise to her regimen.    Patient Instructions   Caloric restriction, portion control and physical activity are evidence based strategies for life style changes. Here is a starting point in your case:       Reduced calorie meal plan; Goal is to lose 5-10% weight in the next six months.     Please keep a food journal of what you eat, calories in what you eat, and bring the journal with you to your next appointment.    Consider using applications for smart phones such as [employer], Ludesi, Playroll, LoseIt, Tap&Track, and RelaxM. To keep food journal and track your exercise.     Focus on wet volumetrics, meaning, eat more foods that are high in water and fiber such as fruits and vegetables in order to get that full feeling. These are also good for your overall health as well.    Progressively increase physical activity to 45 minutes, including combination of cardio & resistance training x 5 days weekly. Start at 10-15 minutes per day and progressively work towards this goal.     Some insurance covers programs like weight watchers and I encourage you to contact your insurance.  Some individuals find programs like weight watchers helpful in their journey to a healthy life style.       Please call Jeyson Arias in December for labs.      I will contact the patient with the test results.  Return to clinic in 12 months.    Test and/or medications prescribed today:  Orders Placed This Encounter    Procedures     US head neck soft tissue     TSH     T4 free     Thyroglobulin and antibody     Vitamin D Deficiency (D3 Only)     Albumin level     Alkaline phosphatase     Calcium     Phosphorus     Urea nitrogen     Creatinine         Owen Handley MD  Staff Endocrinologist    [phone]  Division of Endocrinology and Diabetes

## 2019-06-24 NOTE — PATIENT INSTRUCTIONS
Caloric restriction, portion control and physical activity are evidence based strategies for life style changes. Here is a starting point in your case:       Reduced calorie meal plan; Goal is to lose 5-10% weight in the next six months.     Please keep a food journal of what you eat, calories in what you eat, and bring the journal with you to your next appointment.    Consider using applications for smart phones such as [employer], [employer], [employer], LoseIt, Tap&Track, and RelaxM. To keep food journal and track your exercise.     Focus on wet volumetrics, meaning, eat more foods that are high in water and fiber such as fruits and vegetables in order to get that full feeling. These are also good for your overall health as well.    Progressively increase physical activity to 45 minutes, including combination of cardio & resistance training x 5 days weekly. Start at 10-15 minutes per day and progressively work towards this goal.     Some insurance covers programs like weight watchers and I encourage you to contact your insurance.  Some individuals find programs like weight watchers helpful in their journey to a healthy life style.       Please call Jeyson Arias in December for labs.

## 2019-06-25 LAB — DEPRECATED CALCIDIOL+CALCIFEROL SERPL-MC: 22 UG/L (ref 20–75)

## 2019-06-28 DIAGNOSIS — E83.51 HYPOCALCEMIA: Primary | ICD-10-CM

## 2019-06-28 DIAGNOSIS — E89.0 POSTOPERATIVE HYPOTHYROIDISM: ICD-10-CM

## 2019-07-02 LAB — LAB SCANNED RESULT: NORMAL

## 2019-07-03 ENCOUNTER — TELEPHONE (OUTPATIENT)
Dept: ENDOCRINOLOGY | Facility: CLINIC | Age: 49
End: 2019-07-03

## 2019-07-03 ENCOUNTER — HOSPITAL ENCOUNTER (OUTPATIENT)
Facility: AMBULATORY SURGERY CENTER | Age: 49
Discharge: HOME | End: 2019-07-03
Attending: FAMILY MEDICINE | Admitting: FAMILY MEDICINE
Payer: COMMERCIAL

## 2019-07-03 VITALS
RESPIRATION RATE: 16 BRPM | OXYGEN SATURATION: 96 % | TEMPERATURE: 96.8 F | SYSTOLIC BLOOD PRESSURE: 133 MMHG | DIASTOLIC BLOOD PRESSURE: 93 MMHG | HEART RATE: 63 BPM

## 2019-07-03 LAB — COLONOSCOPY: NORMAL

## 2019-07-03 PROCEDURE — G8907 PT DOC NO EVENTS ON DISCHARG: HCPCS

## 2019-07-03 PROCEDURE — G8918 PT W/O PREOP ORDER IV AB PRO: HCPCS

## 2019-07-03 PROCEDURE — 99152 MOD SED SAME PHYS/QHP 5/>YRS: CPT | Performed by: FAMILY MEDICINE

## 2019-07-03 PROCEDURE — 45378 DIAGNOSTIC COLONOSCOPY: CPT

## 2019-07-03 PROCEDURE — G0121 COLON CA SCRN NOT HI RSK IND: HCPCS | Performed by: FAMILY MEDICINE

## 2019-07-03 RX ORDER — LIDOCAINE 40 MG/G
CREAM TOPICAL
Status: DISCONTINUED | OUTPATIENT
Start: 2019-07-03 | End: 2019-07-04 | Stop reason: HOSPADM

## 2019-07-03 RX ORDER — FENTANYL CITRATE 50 UG/ML
INJECTION, SOLUTION INTRAMUSCULAR; INTRAVENOUS PRN
Status: DISCONTINUED | OUTPATIENT
Start: 2019-07-03 | End: 2019-07-03 | Stop reason: HOSPADM

## 2019-07-03 RX ORDER — ONDANSETRON 2 MG/ML
4 INJECTION INTRAMUSCULAR; INTRAVENOUS
Status: DISCONTINUED | OUTPATIENT
Start: 2019-07-03 | End: 2019-07-04 | Stop reason: HOSPADM

## 2019-07-03 NOTE — TELEPHONE ENCOUNTER
Received result message from Dr. Handley as follows:    Notes recorded by Owen Handley MD on 7/3/2019 at 11:52 AM CDT  Kami,    Your thyroglobulin level is stable when compared to your previous results.  This is a level we follow-up from thyroid cancer standpoint.    Please let me know if you have any questions regarding this result.    Owen Handley    ------    Notes recorded by wOen Handley MD on 7/1/2019 at 12:21 PM CDT  [email or web address] message not read? If correct, please call and communicate the results 6/24&6/28.  ------    Notes recorded by Owen Handley MD on 6/28/2019 at 10:17 AM CDT  Kami,  Based on your vitamin level; I recommend vitamin D 1000 international unit(s) daily.  This is available over the counter.   Please check calcium labs in 5-6 months and this is ordered.   Please let me know if you have any questions.   Owen Handley            Patient advised of all results and recommendations from Dr. Handley. Patient verbalizes understanding and agrees to plan. Patient requested that writer clarify with Dr. Handley that nothing needs to be changed with her calcitriol or calcium supplements?    Will review with Dr. Handley and contact patient with recommendations.       Johanna Rodríguez RN  Endocrine Care Coordinator  [facility]

## 2019-08-15 ENCOUNTER — TRANSFERRED RECORDS (OUTPATIENT)
Dept: HEALTH INFORMATION MANAGEMENT | Facility: CLINIC | Age: 49
End: 2019-08-15

## 2019-08-19 NOTE — PROGRESS NOTES
"Gunnar Natarajan is a 49 year old female who presents to clinic today for the following health issues:    History of Present Illness        She eats 4 or more servings of fruits and vegetables daily.She consumes 3 sweetened beverage(s) daily.  She is taking medications regularly.       Snoring for the last 2 years. Has worsened overtime. Requesting cpap.     Hypothyroidism Follow-up      Since last visit, patient describes the following symptoms: Weight stable, no hair loss, no skin changes, no constipation, no loose stools    Vitamin D  The patient notes that she got her results from Endocrinology, and she is wondering what she needs to take for her Vitamin D. She is wondering if this is an OTC medication.     Sleep   The patient notes that her snoring is bothering her family. She notes that her snoring has gotten progressively worse. She states that she tends to wake up when sleeping. She also notes waking up choking when she sleeps.   The patient states that she does not feel tired when she wakes up in the morning. She does not know if she stops breathing when she sleeps.   She notes her adopted Brother has sleep apnea, but her parents do not.     Asthma   Patient reports her asthma is controlled. She notes that on occasion, she feels like there is \"something stuck in her throat\". She notes that this normal for her. She also notes having occasional phlegm, which is also normal.     Patient Active Problem List   Diagnosis     Mild persistent asthma     CARDIOVASCULAR SCREENING; LDL GOAL LESS THAN 160     Hirsutism     Alpha-1-antitrypsin deficiency carrier (H)     Multiple respiratory allergies     Plantar fasciitis     Hip strain     Thyroid nodule     Thyroid cancer (H)     Papillary thyroid carcinoma (H)     S/P complete thyroidectomy     Hypocalcemia     Postoperative hypothyroidism     Postsurgical hypoparathyroidism (H)     Menstrual changes     Metrorrhagia     IUD (intrauterine device) in place "     Hx of papillary thyroid carcinoma     Other hypoparathyroidism (H)     Class 2 obesity due to excess calories without serious comorbidity with body mass index (BMI) of 39.0 to 39.9 in adult     Morbid obesity (H)     Past Surgical History:   Procedure Laterality Date     COLONOSCOPY WITH CO2 INSUFFLATION N/A 7/3/2019    Procedure: COLONOSCOPY, WITH CO2 INSUFFLATION;  Surgeon: Fabiana Ramos MD;  Location: [facility] OR     DILATION AND CURETTAGE, HYSTEROSCOPY DIAGNOSTIC, COMBINED N/A 07/13/2017     DILATION AND CURETTAGE, OPERATIVE HYSTEROSCOPY, COMBINED N/A 7/13/2017    Procedure: COMBINED DILATION AND CURETTAGE, OPERATIVE HYSTEROSCOPY;  Hysteroscopy, D&C, Mirena insertion;  Surgeon: Sierra Lane DO;  Location: [facility] OR     HERNIA REPAIR, INGUINAL RT/LT  infant     INSERT INTRAUTERINE DEVICE N/A 7/13/2017    Procedure: INSERT INTRAUTERINE DEVICE;;  Surgeon: Sierra Lane DO;  Location: [facility] OR     THYROIDECTOMY  8/8/2012    Procedure: THYROIDECTOMY;  Total Thyroidectomy and Central Neck Dissection;  Surgeon: Philomena Ruiz MD;  Location: [facility] OR       Social History     Tobacco Use     Smoking status: Former Smoker     Smokeless tobacco: Never Used   Substance Use Topics     Alcohol use: Yes     Comment: 0-1 per month      Family History   Problem Relation Age of Onset     Asthma Father      Cancer Father         cirrhosis and liver cancer     Genetic Disorder Father         alpha 1 antitrypsin deficiency     Thyroid Disease Mother         also on blood thinner. unclear history         Current Outpatient Medications   Medication Sig Dispense Refill     albuterol (PROAIR HFA/PROVENTIL HFA/VENTOLIN HFA) 108 (90 Base) MCG/ACT inhaler Inhale 2 puffs into the lungs every 4 hours as needed for shortness of breath / dyspnea 1 Inhaler 6     calcitRIOL (ROCALTROL) 0.25 MCG capsule Take 1 capsule (0.25 mcg) by mouth 2 times daily 180 capsule 3     calcium 600 MG tablet Take 2 tablets by mouth 2 times daily        Fluticasone Propionate (FLONASE NA) Spray 1 spray in nostril daily       fluticasone-salmeterol (ADVAIR DISKUS) 250-50 MCG/DOSE inhaler INHALE ONE PUFF BY MOUTH TWICE DAILY 3 Inhaler 3     hydrocortisone (ANUSOL-HC) 2.5 % cream Place 1 g rectally 2 times daily as needed for hemorrhoids 30 g 5     levonorgestrel (MIRENA, 52 MG,) 20 MCG/24HR IUD 1 each by Intrauterine route once       levothyroxine (SYNTHROID/LEVOTHROID) 200 MCG tablet Take 1 tablet (200 mcg) by mouth daily 90 tablet 3     loratadine (CLARITIN) 10 MG tablet Take 10 mg by mouth daily       montelukast (SINGULAIR) 10 MG tablet Take 1 tablet (10 mg) by mouth daily 90 tablet 3     omeprazole (PRILOSEC) 10 MG DR capsule Take 20 mg by mouth every other day       No Known Allergies  Recent Labs   Lab Test 06/24/19  1106 01/04/19  1341  07/10/17  1041 04/14/17  1034  04/29/16  1056  11/05/14  1300   A1C  --   --   --   --   --   --   --   --  5.3   LDL  --  127*  --   --  93  --  105*   < >  --    HDL  --  44*  --   --  51  --  49*   < >  --    TRIG  --  155*  --   --  127  --  76   < >  --    ALT  --  26  --   --  21  --   --   --   --    CR 0.98 1.11*   < > 1.03 0.94  --  0.91   < >  --    GFRESTIMATED 67 58*   < > 57* 63  --  66   < >  --    GFRESTBLACK 78 67   < > 69 76  --  80   < >  --    POTASSIUM  --  3.9  --  3.7 4.1  --  3.7   < >  --    TSH 0.66 0.72   < >  --   --    < >  --    < >  --     < > = values in this interval not displayed.      BP Readings from Last 3 Encounters:   08/23/19 118/86   07/03/19 (!) 133/93   06/24/19 114/76    Wt Readings from Last 3 Encounters:   08/23/19 111.7 kg (246 lb 4.8 oz)   06/24/19 110.9 kg (244 lb 7.8 oz)   03/18/19 109.6 kg (241 lb 9.6 oz)                    Reviewed and updated as needed this visit by Provider         Review of Systems   ROS COMP: CONSTITUTIONAL: NEGATIVE for fever, chills, change in weight; POSITIVE for snoring   ENT/MOUTH: NEGATIVE for ear, mouth and throat problems  RESP: NEGATIVE for  "significant cough or SOB  CV: NEGATIVE for chest pain, palpitations or peripheral edema    This document serves as a record of the services and decisions personally performed and made by Fabiana Ramos MD. It was created on her behalf by Malika Gonsalves, a trained medical scribe. The creation of this document is based the provider's statements to the medical scribe.    Malika Gonsalves August 23, 2019 3:07 PM        Objective    /86   Pulse 85   Temp 98.2  F (36.8  C) (Temporal)   Resp 14   Ht 1.664 m (5' 5.5\")   Wt 111.7 kg (246 lb 4.8 oz)   LMP  (LMP Unknown)   SpO2 98%   Breastfeeding? No   BMI 40.36 kg/m    Body mass index is 40.36 kg/m .  Physical Exam   GENERAL: healthy, alert and no distress  HENT: mouth without ulcers or lesions  NECK: no adenopathy, no asymmetry, masses, or scars and thyroid normal to palpation  RESP: lungs clear to auscultation - no rales, rhonchi or wheezes  CV: regular rate and rhythm, normal S1 S2, no S3 or S4, no murmur, click or rub, no peripheral edema and peripheral pulses strong    Diagnostic Test Results:  No results found for this or any previous visit (from the past 24 hour(s)).        Assessment & Plan     1. Snoring  Patient reports her snoring has gotten worse recently.   She reports \"waking up when sleeping\" and \"waking up choking when asleep\".  Patient declines feeling tired or yawning when waking up in morning.   Patient is unsure if she stops breathing when sleeping.  Discussed plan with patient.  Patient agreed.   Advised patient that she will need to consult Sleep Specialist for consultation. Sleep specialist will then take next appropriate steps for issues noted above.   Sleep evaluation referral has been placed.   Patient will schedule appointment.   - SLEEP EVALUATION & MANAGEMENT REFERRAL - [facility] [place]  [phone] (Age 15 and up); Future    2. Morbid obesity (H)  See above.   Patient has history of morbid obesity. " "  Advised patient of health benefits weight loss can bring.  Sleep evaluation referral has been placed.   Patient will schedule appointment.   - SLEEP EVALUATION & MANAGEMENT REFERRAL - ADULT -[facility] - [place]  [phone] (Age 15 and up); Future    3. Mild persistent asthma without complication  Doing well. Well controlled. Tolerating medication.  No change in plan.      BMI:   Estimated body mass index is 40.36 kg/m  as calculated from the following:    Height as of this encounter: 1.664 m (5' 5.5\").    Weight as of this encounter: 111.7 kg (246 lb 4.8 oz).   Weight management plan: Discussed healthy diet and exercise guidelines    Follow up- Come back in 5 months for physical exam.     The information in this document, created by the medical scribe for me, accurately reflects the services I personally performed and the decisions made by me. I have reviewed and approved this document for accuracy prior to leaving the patient care area.    EMILY SEPULVEDA MD, MD  [facility]    "

## 2019-08-23 ENCOUNTER — OFFICE VISIT (OUTPATIENT)
Dept: FAMILY MEDICINE | Facility: CLINIC | Age: 49
End: 2019-08-23
Payer: COMMERCIAL

## 2019-08-23 VITALS
OXYGEN SATURATION: 98 % | HEART RATE: 85 BPM | TEMPERATURE: 98.2 F | HEIGHT: 66 IN | DIASTOLIC BLOOD PRESSURE: 86 MMHG | WEIGHT: 246.3 LBS | SYSTOLIC BLOOD PRESSURE: 118 MMHG | BODY MASS INDEX: 39.58 KG/M2 | RESPIRATION RATE: 14 BRPM

## 2019-08-23 DIAGNOSIS — E66.01 MORBID OBESITY (H): ICD-10-CM

## 2019-08-23 DIAGNOSIS — J45.30 MILD PERSISTENT ASTHMA WITHOUT COMPLICATION: ICD-10-CM

## 2019-08-23 DIAGNOSIS — R06.83 SNORING: Primary | ICD-10-CM

## 2019-08-23 PROCEDURE — 99214 OFFICE O/P EST MOD 30 MIN: CPT | Performed by: FAMILY MEDICINE

## 2019-08-23 ASSESSMENT — PAIN SCALES - GENERAL: PAINLEVEL: NO PAIN (0)

## 2019-08-23 ASSESSMENT — MIFFLIN-ST. JEOR: SCORE: 1746.02

## 2019-08-23 NOTE — PATIENT INSTRUCTIONS
Endocrinology recommended vitamin D 1000 international unit(s) daily. This is available over the counter.

## 2019-08-24 ASSESSMENT — ASTHMA QUESTIONNAIRES: ACT_TOTALSCORE: 23

## 2019-09-25 PROBLEM — E66.09 CLASS 2 OBESITY DUE TO EXCESS CALORIES WITHOUT SERIOUS COMORBIDITY WITH BODY MASS INDEX (BMI) OF 39.0 TO 39.9 IN ADULT: Chronic | Status: ACTIVE | Noted: 2019-06-24

## 2019-09-25 PROBLEM — E66.812 CLASS 2 OBESITY DUE TO EXCESS CALORIES WITHOUT SERIOUS COMORBIDITY WITH BODY MASS INDEX (BMI) OF 39.0 TO 39.9 IN ADULT: Chronic | Status: ACTIVE | Noted: 2019-06-24

## 2019-09-25 PROBLEM — Z85.850 HX OF PAPILLARY THYROID CARCINOMA: Chronic | Status: ACTIVE | Noted: 2019-06-24

## 2019-09-27 ENCOUNTER — OFFICE VISIT (OUTPATIENT)
Dept: SLEEP MEDICINE | Facility: CLINIC | Age: 49
End: 2019-09-27
Payer: COMMERCIAL

## 2019-09-27 VITALS
OXYGEN SATURATION: 98 % | DIASTOLIC BLOOD PRESSURE: 81 MMHG | SYSTOLIC BLOOD PRESSURE: 119 MMHG | HEIGHT: 66 IN | WEIGHT: 243 LBS | BODY MASS INDEX: 39.05 KG/M2 | HEART RATE: 77 BPM

## 2019-09-27 DIAGNOSIS — R06.89 DYSPNEA AND RESPIRATORY ABNORMALITY: Primary | ICD-10-CM

## 2019-09-27 DIAGNOSIS — R06.83 SNORING: ICD-10-CM

## 2019-09-27 DIAGNOSIS — E66.01 MORBID OBESITY (H): ICD-10-CM

## 2019-09-27 DIAGNOSIS — R06.00 DYSPNEA AND RESPIRATORY ABNORMALITY: Primary | ICD-10-CM

## 2019-09-27 DIAGNOSIS — E66.812 CLASS 2 OBESITY DUE TO EXCESS CALORIES WITH BODY MASS INDEX (BMI) OF 38.0 TO 38.9 IN ADULT, UNSPECIFIED WHETHER SERIOUS COMORBIDITY PRESENT: ICD-10-CM

## 2019-09-27 DIAGNOSIS — E66.09 CLASS 2 OBESITY DUE TO EXCESS CALORIES WITH BODY MASS INDEX (BMI) OF 38.0 TO 38.9 IN ADULT, UNSPECIFIED WHETHER SERIOUS COMORBIDITY PRESENT: ICD-10-CM

## 2019-09-27 PROCEDURE — 99214 OFFICE O/P EST MOD 30 MIN: CPT | Performed by: PHYSICIAN ASSISTANT

## 2019-09-27 RX ORDER — FAMOTIDINE 20 MG
TABLET ORAL
COMMUNITY

## 2019-09-27 ASSESSMENT — MIFFLIN-ST. JEOR: SCORE: 1731.05

## 2019-09-27 NOTE — PATIENT INSTRUCTIONS
Your BMI is Body mass index is 39.82 kg/m .  Weight management is a personal decision.  If you are interested in exploring weight loss strategies, the following discussion covers the approaches that may be successful. Body mass index (BMI) is one way to tell whether you are at a healthy weight, overweight, or obese. It measures your weight in relation to your height.  A BMI of 18.5 to 24.9 is in the healthy range. A person with a BMI of 25 to 29.9 is considered overweight, and someone with a BMI of 30 or greater is considered obese. More than two-thirds of American adults are considered overweight or obese.  Being overweight or obese increases the risk for further weight gain. Excess weight may lead to heart disease and diabetes.  Creating and following plans for healthy eating and physical activity may help you improve your health.  Weight control is part of healthy lifestyle and includes exercise, emotional health, and healthy eating habits. Careful eating habits lifelong are the mainstay of weight control. Though there are significant health benefits from weight loss, long-term weight loss with diet alone may be very difficult to achieve- studies show long-term success with dietary management in less than 10% of people. Attaining a healthy weight may be especially difficult to achieve in those with severe obesity. In some cases, medications, devices and surgical management might be considered.  What can you do?  If you are overweight or obese and are interested in methods for weight loss, you should discuss this with your provider.     Consider reducing daily calorie intake by 500 calories.     Keep a food journal.     Avoiding skipping meals, consider cutting portions instead.    Diet combined with exercise helps maintain muscle while optimizing fat loss. Strength training is particularly important for building and maintaining muscle mass. Exercise helps reduce stress, increase energy, and improves fitness.  Increasing exercise without diet control, however, may not burn enough calories to loose weight.       Start walking three days a week 10-20 minutes at a time    Work towards walking thirty minutes five days a week     Eventually, increase the speed of your walking for 1-2 minutes at time    In addition, we recommend that you review healthy lifestyles and methods for weight loss available through the National Institutes of Health patient information sites:  http://win.niddk.nih.gov/publications/index.htm    And look into health and wellness programs that may be available through your health insurance provider, employer, local community center, or bull club.    Weight management plan: Patient was referred to their PCP to discuss a diet and exercise plan.

## 2019-09-27 NOTE — NURSING NOTE
"Chief Complaint   Patient presents with     Sleep Problem     consult- Family c/o loud snoring,  can't sleep due to me, wake myself up, feels like choking sometimes       Initial /81   Pulse 77   Ht 1.664 m (5' 5.5\")   Wt 110.2 kg (243 lb)   SpO2 98%   BMI 39.82 kg/m   Estimated body mass index is 39.82 kg/m  as calculated from the following:    Height as of this encounter: 1.664 m (5' 5.5\").    Weight as of this encounter: 110.2 kg (243 lb).    Medication Reconciliation: complete    Neck circumference: 15.5 inches / 39.5 centimeters.      "

## 2019-09-27 NOTE — PROGRESS NOTES
Sleep Consultation:    Date on this visit: 9/27/2019    Kami Natarajan is sent by Fabiana Ramos for a sleep consultation regarding snoring.    Primary Physician: Fabiana Ramos     Chief Complaint   Patient presents with     Sleep Problem     consult- Family c/o loud snoring,  can't sleep due to me, wake myself up, feels like choking sometimes     Kami goes to sleep at 9:00 PM during the week. She wakes up at 6:00 AM with an alarm. She falls asleep in 3 minutes.  Kami denies difficulty falling asleep.  She wakes up 2 times a night for 15-60 minutes before falling back to sleep.  Kami wakes up to go to the bathroom, external stimuli and snoring.  On weekends, Kami goes to sleep at 11:00 PM.  She wakes up at 9:00 AM without an alarm. She falls asleep in 5 minutes.  Patient gets an average of 8 hours of sleep per night.     Patient does not use electronics in bed, watch TV in bed and read in bed.     Kami does not do shift work.     Kami does snore every night and snoring is very loud. Patient does have a regular bed partner. There is report of gasping and choking.  She does not have witnessed apneas. They frequently sleep separately.  Patient sleeps on her back and side. She denies no morning headaches, morning confusion and restless legs. Kami denies any sleep walking, sleep talking, dream enactment, sleep paralysis, cataplexy and hypnogogic/hypnopompic hallucinations. She has bruxism.    Kami denies difficulty breathing through her nose, claustrophobia and reflux at night.      Kami has gained 0-5 pounds in the last year.  Patient describes themself as a morning person.  She would prefer to go to sleep at 9:00 PM and wake up at 6:00 AM.  Patient's [place] Sleepiness score 2/24 inconsistent with excessive  daytime sleepiness.      Kami naps 0 times per week. She takes no inadvertant naps.  She denies falling asleep while driving.  Patient was counseled on the importance of driving while alert, to pull  over if drowsy, or nap before getting into the vehicle if sleepy.  She uses 1-2 sodas/day. Last caffeine intake is usually before noon.    Allergies:    No Known Allergies    Medications:    Current Outpatient Medications   Medication Sig Dispense Refill     albuterol (PROAIR HFA/PROVENTIL HFA/VENTOLIN HFA) 108 (90 Base) MCG/ACT inhaler Inhale 2 puffs into the lungs every 4 hours as needed for shortness of breath / dyspnea 1 Inhaler 6     calcitRIOL (ROCALTROL) 0.25 MCG capsule Take 1 capsule (0.25 mcg) by mouth 2 times daily 180 capsule 3     calcium 600 MG tablet Take 2 tablets by mouth 2 times daily       Fluticasone Propionate (FLONASE NA) Spray 1 spray in nostril daily       fluticasone-salmeterol (ADVAIR DISKUS) 250-50 MCG/DOSE inhaler INHALE ONE PUFF BY MOUTH TWICE DAILY 3 Inhaler 3     hydrocortisone (ANUSOL-HC) 2.5 % cream Place 1 g rectally 2 times daily as needed for hemorrhoids 30 g 5     levonorgestrel (MIRENA, 52 MG,) 20 MCG/24HR IUD 1 each by Intrauterine route once       levothyroxine (SYNTHROID/LEVOTHROID) 200 MCG tablet Take 1 tablet (200 mcg) by mouth daily 90 tablet 3     loratadine (CLARITIN) 10 MG tablet Take 10 mg by mouth daily       montelukast (SINGULAIR) 10 MG tablet Take 1 tablet (10 mg) by mouth daily 90 tablet 3     omeprazole (PRILOSEC) 10 MG DR capsule Take 20 mg by mouth every other day       Vitamin D, Cholecalciferol, 1000 units CAPS          Problem List:  Patient Active Problem List    Diagnosis Date Noted     Morbid obesity (H) 08/23/2019     Priority: Medium     Snoring 08/23/2019     Priority: Medium     Hx of papillary thyroid carcinoma 06/24/2019     Priority: Medium     Other hypoparathyroidism (H) 06/24/2019     Priority: Medium     Class 2 obesity due to excess calories without serious comorbidity with body mass index (BMI) of 39.0 to 39.9 in adult 06/24/2019     Priority: Medium     IUD (intrauterine device) in place 07/28/2017     Priority: Medium     Mirena placed  7/13/17       Metrorrhagia 02/14/2014     Priority: Medium     Menstrual changes 02/12/2014     Priority: Medium     Postoperative hypothyroidism 11/13/2012     Priority: Medium     Postsurgical hypoparathyroidism (H) 11/13/2012     Priority: Medium     S/P complete thyroidectomy 09/25/2012     Priority: Medium     Hypocalcemia 09/25/2012     Priority: Medium     Thyroid nodule 06/28/2012     Priority: Medium     Plantar fasciitis 05/10/2012     Priority: Medium     Hip strain 05/10/2012     Priority: Medium     Multiple respiratory allergies 12/13/2011     Priority: Medium     Mild persistent asthma 10/21/2011     Priority: Medium     CARDIOVASCULAR SCREENING; LDL GOAL LESS THAN 160 10/21/2011     Priority: Medium     Hirsutism 10/21/2011     Priority: Medium     Alpha-1-antitrypsin deficiency carrier (H) 10/21/2011     Priority: Medium        Past Medical/Surgical History:  Past Medical History:   Diagnosis Date     Alpha-1-antitrypsin deficiency (H)      Thyroid cancer (H)      Past Surgical History:   Procedure Laterality Date     COLONOSCOPY WITH CO2 INSUFFLATION N/A 7/3/2019    Procedure: COLONOSCOPY, WITH CO2 INSUFFLATION;  Surgeon: Fabiana Ramos MD;  Location: [facility] OR     DILATION AND CURETTAGE, HYSTEROSCOPY DIAGNOSTIC, COMBINED N/A 07/13/2017     DILATION AND CURETTAGE, OPERATIVE HYSTEROSCOPY, COMBINED N/A 7/13/2017    Procedure: COMBINED DILATION AND CURETTAGE, OPERATIVE HYSTEROSCOPY;  Hysteroscopy, D&C, Mirena insertion;  Surgeon: Sierra Lane DO;  Location: [facility] OR     HERNIA REPAIR, INGUINAL RT/LT  infant     INSERT INTRAUTERINE DEVICE N/A 7/13/2017    Procedure: INSERT INTRAUTERINE DEVICE;;  Surgeon: Sierra Lane DO;  Location: [facility] OR     THYROIDECTOMY  8/8/2012    Procedure: THYROIDECTOMY;  Total Thyroidectomy and Central Neck Dissection;  Surgeon: Philomena Ruiz MD;  Location: [facility] OR       Social History:  Social History     Socioeconomic History     Marital status:       Spouse name: Not on file     Number of children: 3     Years of education: Not on file     Highest education level: Not on file   Occupational History     Employer: [employer]   Social Needs     Financial resource strain: Not on file     Food insecurity:     Worry: Not on file     Inability: Not on file     Transportation needs:     Medical: Not on file     Non-medical: Not on file   Tobacco Use     Smoking status: Former Smoker     Smokeless tobacco: Never Used   Substance and Sexual Activity     Alcohol use: Yes     Comment: 0-1 per month      Drug use: No     Sexual activity: Yes     Partners: Male     Birth control/protection: Surgical     Comment: vasectomy   Lifestyle     Physical activity:     Days per week: Not on file     Minutes per session: Not on file     Stress: Not on file   Relationships     Social connections:     Talks on phone: Not on file     Gets together: Not on file     Attends Taoist service: Not on file     Active member of club or organization: Not on file     Attends meetings of clubs or organizations: Not on file     Relationship status: Not on file     Intimate partner violence:     Fear of current or ex partner: Not on file     Emotionally abused: Not on file     Physically abused: Not on file     Forced sexual activity: Not on file   Other Topics Concern     Parent/sibling w/ CABG, MI or angioplasty before 65F 55M? No   Social History Narrative     Not on file       Family History:  Family History   Problem Relation Age of Onset     Asthma Father      Cancer Father         cirrhosis and liver cancer     Genetic Disorder Father         alpha 1 antitrypsin deficiency     Thyroid Disease Mother         also on blood thinner. unclear history       Review of Systems:  A complete review of systems reviewed by me is negative with the exeption of what has been mentioned in the history of present illness.  CONSTITUTIONAL: NEGATIVE for weight gain/loss, fever, chills, sweats or  "night sweats, drug allergies.  EYES: NEGATIVE for changes in vision, blind spots, double vision.  ENT: NEGATIVE for ear pain, sore throat, sinus pain, post-nasal drip, runny nose, bloody nose  CARDIAC: NEGATIVE for fast heartbeats or fluttering in chest, chest pain or pressure, breathlessness when lying flat, swollen legs or swollen feet.  NEUROLOGIC: NEGATIVE headaches, weakness or numbness in the arms or legs.  DERMATOLOGIC: NEGATIVE for rashes, new moles or change in mole(s)  PULMONARY: NEGATIVE SOB at rest, SOB with activity, dry cough, productive cough, coughing up blood, wheezing or whistling when breathing.    GASTROINTESTINAL: NEGATIVE for nausea or vomitting, loose or watery stools, fat or grease in stools, constipation, abdominal pain, bowel movements black in color or blood noted.  GENITOURINARY: NEGATIVE for pain during urination, blood in urine, urinating more frequently than usual, irregular menstrual periods.  MUSCULOSKELETAL: NEGATIVE for muscle pain, bone or joint pain, swollen joints.  ENDOCRINE: NEGATIVE for increased thirst or urination, diabetes.  LYMPHATIC: NEGATIVE for swollen lymph nodes, lumps or bumps in the breasts or nipple discharge.    Physical Examination:  Vitals: /81   Pulse 77   Ht 1.664 m (5' 5.5\")   Wt 110.2 kg (243 lb)   SpO2 98%   BMI 39.82 kg/m    BMI= Body mass index is 39.82 kg/m .    Neck Cir (cm): 40 cm    [place] Total Score 9/27/2019   Total score - [place] 2       JERMAINE Total Score: 13 (09/27/19 1400)    GENERAL APPEARANCE: alert and no distress  EYES: Eyes grossly normal to inspection, PERRL and conjunctivae and sclerae normal  HENT: ear canals and TM's normal and nose and mouth without ulcers or lesions  NECK: no asymmetry, masses, or scars  RESP: lungs clear to auscultation - no rales, rhonchi or wheezes  CV: regular rates and rhythm and normal S1 S2, no S3 or S4  MS: extremities normal- no gross deformities noted  NEURO: Normal strength and tone, mentation " intact and speech normal  PSYCH: mentation appears normal and affect normal/bright  Mallampati Class: II.  Tonsillar Stage:     Last Comprehensive Metabolic Panel:  Sodium   Date Value Ref Range Status   01/04/2019 139 133 - 144 mmol/L Final     Potassium   Date Value Ref Range Status   01/04/2019 3.9 3.4 - 5.3 mmol/L Final     Chloride   Date Value Ref Range Status   01/04/2019 103 94 - 109 mmol/L Final     Carbon Dioxide   Date Value Ref Range Status   01/04/2019 32 20 - 32 mmol/L Final     Anion Gap   Date Value Ref Range Status   01/04/2019 4 3 - 14 mmol/L Final     Glucose   Date Value Ref Range Status   01/04/2019 89 70 - 99 mg/dL Final     Urea Nitrogen   Date Value Ref Range Status   06/24/2019 10 7 - 30 mg/dL Final     Creatinine   Date Value Ref Range Status   06/24/2019 0.98 0.52 - 1.04 mg/dL Final     GFR Estimate   Date Value Ref Range Status   06/24/2019 67 >60 mL/min/[1.73_m2] Final     Comment:     Non  GFR Calc  Starting 12/18/2018, serum creatinine based estimated GFR (eGFR) will be   calculated using the Chronic Kidney Disease Epidemiology Collaboration   (CKD-EPI) equation.       Calcium   Date Value Ref Range Status   06/24/2019 8.6 8.5 - 10.1 mg/dL Final     TSH   Date Value Ref Range Status   06/24/2019 0.66 0.40 - 4.00 mU/L Final       Impression:  Patient has features and risk factors for possible obstructive sleep apnea including: BMI of 39, socially disruptive snoring, choking/gasping arousals, bruxism and difficulty maintaining sleep. The STOP-BANG score is 3/8.     Plan:    1. Recommend Polysomnogram (using 4% desaturation/Medicare/2012 AASM 1B scoring rules) for intermediate risk obstructive sleep apnea.  Patient is a poor candidate for Home Sleep Testing due to not high probability of severe BRETT.  2. Advised her against drowsy driving.    3. Recommend weight management.       Literature provided regarding sleep apnea.      She will follow up with me in approximately two  weeks after her sleep study has been competed to review the results and discuss plan of care.       Polysomnography reviewed.  Obstructive sleep apnea reviewed.  Complications of untreated sleep apnea were reviewed.    aKmlesh Gilman PA-C    CC: Fabiana Ramos

## 2019-10-31 ENCOUNTER — THERAPY VISIT (OUTPATIENT)
Dept: SLEEP MEDICINE | Facility: CLINIC | Age: 49
End: 2019-10-31
Payer: COMMERCIAL

## 2019-10-31 DIAGNOSIS — E66.01 MORBID OBESITY (H): ICD-10-CM

## 2019-10-31 DIAGNOSIS — R06.89 DYSPNEA AND RESPIRATORY ABNORMALITY: ICD-10-CM

## 2019-10-31 DIAGNOSIS — E66.812 CLASS 2 OBESITY DUE TO EXCESS CALORIES WITH BODY MASS INDEX (BMI) OF 38.0 TO 38.9 IN ADULT, UNSPECIFIED WHETHER SERIOUS COMORBIDITY PRESENT: ICD-10-CM

## 2019-10-31 DIAGNOSIS — R06.83 SNORING: ICD-10-CM

## 2019-10-31 DIAGNOSIS — R06.00 DYSPNEA AND RESPIRATORY ABNORMALITY: ICD-10-CM

## 2019-10-31 DIAGNOSIS — E66.09 CLASS 2 OBESITY DUE TO EXCESS CALORIES WITH BODY MASS INDEX (BMI) OF 38.0 TO 38.9 IN ADULT, UNSPECIFIED WHETHER SERIOUS COMORBIDITY PRESENT: ICD-10-CM

## 2019-10-31 PROCEDURE — 95810 POLYSOM 6/> YRS 4/> PARAM: CPT | Performed by: INTERNAL MEDICINE

## 2019-11-06 PROBLEM — G47.33 OSA (OBSTRUCTIVE SLEEP APNEA): Chronic | Status: ACTIVE | Noted: 2019-11-06

## 2019-11-06 NOTE — PROCEDURES
" SLEEP STUDY INTERPRETATION  DIAGNOSTIC POLYSOMNOGRAPHY REPORT      Patient: FARIDEH MALLOY  YOB: 1970  Study Date: 10/31/2019  MRN: [id number]  Referring Provider: Fabiana Ramos  Ordering Provider: Kamlesh Gilman PA-C    Indications for Polysomnography: The patient is a 49 y old Female who is 5' 5\" and weighs 243.0 lbs. Her BMI is 40.0, [place] sleepiness scale 7.0 and neck circumference is 40.0 cm. A diagnostic polysomnogram was performed to evaluate for socially disruptive snoring, choking/gasping arousals, bruxism and difficulty maintaining sleep.    Polysomnogram Data: A full night polysomnogram recorded the standard physiologic parameters including EEG, EOG, EMG, ECG, nasal and oral airflow. Respiratory parameters of chest and abdominal movements were recorded with respiratory inductance plethysmography. Oxygen saturation was recorded by pulse oximetry. Hypopnea scoring rule used: 1B 4%.    Sleep Architecture:   The total recording time of the polysomnogram was 433.9 minutes. The total sleep time was 357.5 minutes. Sleep latency was decreased at 8.0 minutes without the use of a sleep aid. REM latency was 86.0 minutes. Arousal index was increased at 45.3 arousals per hour. Sleep efficiency was normal at 82.4%. Wake after sleep onset was 67.5 minutes. The patient spent 18.5% of total sleep time in Stage N1, 34.4% in Stage N2, 29.0% in Stage N3, and 18.2% in REM. Time in REM supine was 13.0 minutes.    Respiration:     Events ? The polysomnogram revealed a presence of 11 obstructive, 2 central, and 1 mixed apneas resulting in an apnea index of 2.3 events per hour. There were 49 obstructive hypopneas and 0 central hypopneas resulting in an obstructive hypopnea index of 8.2 and central hypopnea index of 0 events per hour. The combined apnea/hypopnea index was 10.6 events per hour (central apnea/hypopnea index was 0.3 events per hour). The REM AHI was 26.8 events per hour. The supine AHI was 17.8 " events per hour. The RERA index was 42.3 events per hour.  The RDI was 52.9 events per hour.    Snoring - was reported as moderate to loud.    Respiratory rate and pattern - was notable for normal respiratory rate and pattern.    Sustained Sleep Associated Hypoventilation - Transcutaneous carbon dioxide monitoring was not used, however significant hypoventilation was not suggested by oximetry    Sleep Associated Hypoxemia - (Greater than 5 minutes O2 sat at or below 88%) Baseline oxygen saturation was 93.2%. Lowest oxygen saturation was 81.1%. Time spent less than or equal to 88% was 3.7 minutes. Time spent less than or equal to 89% was 5.5 minutes.    Movement Activity:     Periodic Limb Activity - There were 0 PLMs during the entire study.     REM EMG Activity - Excessive transient/sustained muscle activity was not present.    Nocturnal Behavior - Abnormal sleep related behaviors were/were not noted     Bruxism - None apparent.      Cardiac Summary:   The average pulse rate was 69.9 bpm. The minimum pulse rate was 52.2 bpm while the maximum pulse rate was 97.9 bpm.  Arrhythmias were not noted.      Assessment:     Mild obstructive sleep apnea      Recommendations:    Consider repeat polysomnography with full night titration of positive airway pressure therapy for the control of sleep disordered breathing.    Based on the presence of mild/moderate obstructive sleep apnea and excessive daytime sleepiness, treatment could be empirically initiated with Auto?titrating PAP therapy with a range of 5 to 15 cmH2O. Recommend clinical follow up with sleep management team.    Patient may be a candidate for dental appliance through referral to Sleep Dentistry for the treatment of obstructive sleep apnea and/or socially disruptive snoring.    If devices are not acceptable or effective, patient may benefit from evaluation of possible surgical options. If she is interested, would recommend referral to specialized ENT-Sleep  provider.    Advice regarding the risks of drowsy driving.    Suggest optimizing sleep schedule and avoiding sleep deprivation.    Weight management (if BMI > 30).    Diagnostic Codes:   Obstructive Sleep Apnea G47.33       _____________________________________   Electronically Signed By: Fletcher Boles MD 11/6/19           Range(%) Time in range (min)   0.0 - 89.0 5.5   0.0 - 88.0 3.7         Stage Min(mm Hg) Max(mm Hg)   Wake - -   NREM(1+2+3) - -   REM - -       Range(mmHg) Time in range (min)   55.0 - 100.0 -   Excluded data <20.0 & >65.0 434.0

## 2019-11-07 LAB — SLPCOMP: NORMAL

## 2019-11-27 ENCOUNTER — OFFICE VISIT (OUTPATIENT)
Dept: SLEEP MEDICINE | Facility: CLINIC | Age: 49
End: 2019-11-27
Payer: COMMERCIAL

## 2019-11-27 VITALS
HEART RATE: 76 BPM | WEIGHT: 244 LBS | BODY MASS INDEX: 38.3 KG/M2 | HEIGHT: 67 IN | SYSTOLIC BLOOD PRESSURE: 129 MMHG | DIASTOLIC BLOOD PRESSURE: 88 MMHG | OXYGEN SATURATION: 98 %

## 2019-11-27 DIAGNOSIS — G47.33 OSA (OBSTRUCTIVE SLEEP APNEA): Primary | ICD-10-CM

## 2019-11-27 PROCEDURE — 99214 OFFICE O/P EST MOD 30 MIN: CPT | Performed by: PHYSICIAN ASSISTANT

## 2019-11-27 ASSESSMENT — MIFFLIN-ST. JEOR: SCORE: 1759.41

## 2019-11-27 NOTE — NURSING NOTE
"Chief Complaint   Patient presents with     Study Results       Initial /88   Pulse 76   Ht 1.702 m (5' 7\")   Wt 110.7 kg (244 lb)   SpO2 98%   BMI 38.22 kg/m   Estimated body mass index is 38.22 kg/m  as calculated from the following:    Height as of this encounter: 1.702 m (5' 7\").    Weight as of this encounter: 110.7 kg (244 lb).    Medication Reconciliation: complete      "

## 2019-11-27 NOTE — PATIENT INSTRUCTIONS
Your BMI is Body mass index is 38.22 kg/m .  Weight management is a personal decision.  If you are interested in exploring weight loss strategies, the following discussion covers the approaches that may be successful. Body mass index (BMI) is one way to tell whether you are at a healthy weight, overweight, or obese. It measures your weight in relation to your height.  A BMI of 18.5 to 24.9 is in the healthy range. A person with a BMI of 25 to 29.9 is considered overweight, and someone with a BMI of 30 or greater is considered obese. More than two-thirds of American adults are considered overweight or obese.  Being overweight or obese increases the risk for further weight gain. Excess weight may lead to heart disease and diabetes.  Creating and following plans for healthy eating and physical activity may help you improve your health.  Weight control is part of healthy lifestyle and includes exercise, emotional health, and healthy eating habits. Careful eating habits lifelong are the mainstay of weight control. Though there are significant health benefits from weight loss, long-term weight loss with diet alone may be very difficult to achieve- studies show long-term success with dietary management in less than 10% of people. Attaining a healthy weight may be especially difficult to achieve in those with severe obesity. In some cases, medications, devices and surgical management might be considered.  What can you do?  If you are overweight or obese and are interested in methods for weight loss, you should discuss this with your provider.     Consider reducing daily calorie intake by 500 calories.     Keep a food journal.     Avoiding skipping meals, consider cutting portions instead.    Diet combined with exercise helps maintain muscle while optimizing fat loss. Strength training is particularly important for building and maintaining muscle mass. Exercise helps reduce stress, increase energy, and improves fitness.  Increasing exercise without diet control, however, may not burn enough calories to loose weight.       Start walking three days a week 10-20 minutes at a time    Work towards walking thirty minutes five days a week     Eventually, increase the speed of your walking for 1-2 minutes at time    In addition, we recommend that you review healthy lifestyles and methods for weight loss available through the National Institutes of Health patient information sites:  http://win.niddk.nih.gov/publications/index.htm    And look into health and wellness programs that may be available through your health insurance provider, employer, local community center, or bull club.    Weight management plan: Patient was referred to their PCP to discuss a diet and exercise plan.

## 2019-11-27 NOTE — PROGRESS NOTES
Sleep Study Follow-Up Visit:    Date on this visit: 11/27/2019    Kami Natarajan comes in today for follow-up of her sleep study done on 10/31/2019 at the [facility] for socially disruptive snoring, choking/gasping arousals, bruxism and difficulty maintaining sleep. The STOP-BANG score is 3/8.    Polysomnogram interpreted by Dr. Boles.    Diagnostic PSG  Sleep Architecture:   The total recording time of the polysomnogram was 433.9 minutes. The total sleep time was 357.5 minutes. Sleep latency was decreased at 8.0 minutes without the use of a sleep aid. REM latency was 86.0 minutes. Arousal index was increased at 45.3 arousals per hour. Sleep efficiency was normal at 82.4%. Wake after sleep onset was 67.5 minutes. The patient spent 18.5% of total sleep time in Stage N1, 34.4% in Stage N2, 29.0% in Stage N3, and 18.2% in REM. Time in REM supine was 13.0 minutes.   Respiration:     Events ? The polysomnogram revealed a presence of 11 obstructive, 2 central, and 1 mixed apneas resulting in an apnea index of 2.3 events per hour. There were 49 obstructive hypopneas and 0 central hypopneas resulting in an obstructive hypopnea index of 8.2 and central hypopnea index of 0 events per hour. The combined apnea/hypopnea index was 10.6 events per hour (central apnea/hypopnea index was 0.3 events per hour). The REM AHI was 26.8 events per hour. The supine AHI was 17.8 events per hour. The RERA index was 42.3 events per hour.  The RDI was 52.9 events per hour.    Snoring - was reported as moderate to loud.    Respiratory rate and pattern - was notable for normal respiratory rate and pattern.    Sustained Sleep Associated Hypoventilation - Transcutaneous carbon dioxide monitoring was not used, however significant hypoventilation was not suggested by oximetry    Sleep Associated Hypoxemia - (Greater than 5 minutes O2 sat at or below 88%) Baseline oxygen saturation was 93.2%. Lowest oxygen saturation was 81.1%.  Time spent less than or equal to 88% was 3.7 minutes. Time spent less than or equal to 89% was 5.5 minutes.   Movement Activity:     Periodic Limb Activity - There were 0 PLMs during the entire study.     REM EMG Activity - Excessive transient/sustained muscle activity was not present.    Nocturnal Behavior - Abnormal sleep related behaviors were/were not noted     Bruxism - None apparent.  Cardiac Summary:   The average pulse rate was 69.9 bpm. The minimum pulse rate was 52.2 bpm while the maximum pulse rate was 97.9 bpm.  Arrhythmias were not noted.  Past medical/surgical history, family history, social history, medications and allergies were reviewed.      Problem List:  Patient Active Problem List    Diagnosis Date Noted     BRETT (obstructive sleep apnea)- mild (AHI 10) 11/06/2019     Priority: Medium     10/31/2019 [facility] (243.0 lbs) - AHI 10.6, RDI 52.9, Supine AHI 17.8, REM AHI 26.8, Low O2 81.1%, Time Spent ?88% 3.7 minutes / Time Spent ?89% 5.5 minutes.       Hx of papillary thyroid carcinoma 06/24/2019     Priority: Medium     Other hypoparathyroidism (H) 06/24/2019     Priority: Medium     Class 2 obesity due to excess calories without serious comorbidity with body mass index (BMI) of 39.0 to 39.9 in adult 06/24/2019     Priority: Medium     IUD (intrauterine device) in place 07/28/2017     Priority: Medium     Mirena placed 7/13/17       Postoperative hypothyroidism 11/13/2012     Priority: Medium     Postsurgical hypoparathyroidism (H) 11/13/2012     Priority: Medium     S/P complete thyroidectomy 09/25/2012     Priority: Medium     Plantar fasciitis 05/10/2012     Priority: Medium     Multiple respiratory allergies 12/13/2011     Priority: Medium     Mild persistent asthma 10/21/2011     Priority: Medium     CARDIOVASCULAR SCREENING; LDL GOAL LESS THAN 160 10/21/2011     Priority: Medium     Hirsutism 10/21/2011     Priority: Medium     Alpha-1-antitrypsin deficiency carrier  "10/21/2011     Priority: Medium     /88   Pulse 76   Ht 1.702 m (5' 7\")   Wt 110.7 kg (244 lb)   SpO2 98%   BMI 38.22 kg/m      Impression/Plan:  1. Mild obstructive sleep apnea without sleep related hypoxemia.  Polysomnogram was reviewed in detail today with the patient and a copy given to her for her records.     Counseled the patient that mild sleep apnea is not felt to significantly increase long-term cardiovascular risk, but can contribute to excessive daytime sleepiness.    Treatment options discussed today including no treatment,   auto-CPAP, oral appliance therapy or polysomnography with full night PAP titration.  Elected treatment with oral appliance therapy.  Sleep Dental referral placed.    She will follow up with me as needed.     Twenty-five minutes spent with patient, all of which were spent face-to-face counseling, consulting, coordinating plan of care.      Kamlesh Gilman PA-C    CC: Fabiana Ramos     "

## 2019-12-10 ENCOUNTER — OFFICE VISIT (OUTPATIENT)
Dept: FAMILY MEDICINE | Facility: CLINIC | Age: 49
End: 2019-12-10
Payer: COMMERCIAL

## 2019-12-10 VITALS
HEART RATE: 77 BPM | WEIGHT: 249.2 LBS | BODY MASS INDEX: 39.03 KG/M2 | TEMPERATURE: 99.3 F | RESPIRATION RATE: 16 BRPM | OXYGEN SATURATION: 95 % | DIASTOLIC BLOOD PRESSURE: 84 MMHG | SYSTOLIC BLOOD PRESSURE: 136 MMHG

## 2019-12-10 DIAGNOSIS — G47.33 OSA (OBSTRUCTIVE SLEEP APNEA): Chronic | ICD-10-CM

## 2019-12-10 DIAGNOSIS — J06.9 VIRAL URI WITH COUGH: Primary | ICD-10-CM

## 2019-12-10 DIAGNOSIS — J45.30 MILD PERSISTENT ASTHMA WITHOUT COMPLICATION: Chronic | ICD-10-CM

## 2019-12-10 PROCEDURE — 99214 OFFICE O/P EST MOD 30 MIN: CPT | Performed by: FAMILY MEDICINE

## 2019-12-10 RX ORDER — BENZONATATE 100 MG/1
100 CAPSULE ORAL 3 TIMES DAILY PRN
Qty: 15 CAPSULE | Refills: 0 | Status: SHIPPED | OUTPATIENT
Start: 2019-12-10 | End: 2020-07-31

## 2019-12-10 RX ORDER — PREDNISONE 50 MG/1
50 TABLET ORAL DAILY
Qty: 5 TABLET | Refills: 0 | Status: SHIPPED | OUTPATIENT
Start: 2019-12-10 | End: 2019-12-18

## 2019-12-10 ASSESSMENT — PAIN SCALES - GENERAL: PAINLEVEL: MILD PAIN (2)

## 2019-12-10 NOTE — PATIENT INSTRUCTIONS
Patient Education     Viral Upper Respiratory Illness (Adult)    You have a viral upper respiratory illness (URI), which is another term for the common cold. This illness is contagious during the first few days. It is spread through the air by coughing and sneezing. It may also be spread by direct contact (touching the sick person and then touching your own eyes, nose, or mouth). Frequent handwashing will decrease risk of spread. Most viral illnesses go away within 7 to 10 days with rest and simple home remedies. Sometimes the illness may last for several weeks. Antibiotics will not kill a virus, and they are generally not prescribed for this condition.  Home care    If symptoms are severe, rest at home for the first 2 to 3 days. When you resume activity, don't let yourself get too tired.    Don't smoke. If you need help stopping, talk with your healthcare provider.    Avoid being exposed to cigarette smoke (yours or others ).    You may use acetaminophen or ibuprofen to control pain and fever, unless another medicine was prescribed. If you have chronic liver or kidney disease, have ever had a stomach ulcer or gastrointestinal bleeding, or are taking blood-thinning medicines, talk with your healthcare provider before using these medicines. Aspirin should never be given to anyone under 18 years of age who is ill with a viral infection or fever. It may cause severe liver or brain damage.    Your appetite may be poor, so a light diet is fine. Stay well hydrated by drinking 6 to 8 glasses of fluids per day (water, soft drinks, juices, tea, or soup). Extra fluids will help loosen secretions in the nose and lungs.    Over-the-counter cold medicines will not shorten the length of time you re sick, but they may be helpful for the following symptoms: cough, sore throat, and nasal and sinus congestion. If you take prescription medicines, ask your healthcare provider or pharmacist which over-the-counter medicines are safe to  use. (Note: Don't use decongestants if you have high blood pressure.)  Follow-up care  Follow up with your healthcare provider, or as advised.  When to seek medical advice  Call your healthcare provider right away if any of these occur:    Cough with lots of colored sputum (mucus)    Severe headache; face, neck, or ear pain    Difficulty swallowing due to throat pain    Fever of 100.4 F (38 C) or higher, or as directed by your healthcare provider  Call 911  Call 911 if any of these occur:    Chest pain, shortness of breath, wheezing, or difficulty breathing    Coughing up blood    Very severe pain with swallowing, especially if it goes along with a muffled voice   Date Last Reviewed: 6/1/2018 2000-2018 [employer]. [address] [place] [address], [place] [address]. All rights reserved. This information is not intended as a substitute for professional medical care. Always follow your healthcare professional's instructions.         Kami,    It was great seeing you in clinic today.  I summarized our discussion and your plan below.  Please let me know if you have any questions or concerns.  Please follow up with me as discussed in clinic or sooner if any worsening or additional concerns.     1. Viral URI with cough  49-year-old female with history of asthma here for 1 month history of upper respiratory and sinus issues.  Feels like her asthma is exacerbated primarily at night.  Following her asthma action plan, has required slight increase in albuterol use.  Examination today was reassuring.  She works as 1/5  and is anxious to get back to work.  Recommended rest, good hydration, anticipate improvement over the next 24 hours.  If no improvement given prednisone burst that has been added to her asthma action plan.  - predniSONE (DELTASONE) 50 MG tablet; Take 1 tablet (50 mg) by mouth daily  Dispense: 5 tablet; Refill: 0  - benzonatate (TESSALON) 100 MG capsule; Take 1 capsule (100 mg) by mouth 3  times daily as needed for cough  Dispense: 15 capsule; Refill: 0    2. Mild persistent asthma without complication  See above.  - predniSONE (DELTASONE) 50 MG tablet; Take 1 tablet (50 mg) by mouth daily  Dispense: 5 tablet; Refill: 0  - benzonatate (TESSALON) 100 MG capsule; Take 1 capsule (100 mg) by mouth 3 times daily as needed for cough  Dispense: 15 capsule; Refill: 0    3. BRETT (obstructive sleep apnea)- mild (AHI 10)  Currently well controlled without CPAP.       Sincerely,  Dr. SOURAV Wagner MD, FAAFP  Family Medicine Physician  [facility] [place]  [address] [place] [address]    Patient Education

## 2019-12-10 NOTE — PROGRESS NOTES
Subjective     Kami Natarajan is a 49 year old female who presents to clinic today for the following health issues:    History of Present Illness        She eats 4 or more servings of fruits and vegetables daily.She consumes 3 sweetened beverage(s) daily.  She is taking medications regularly.     Acute Illness   Acute illness concerns: sinus infection and cough  Onset: 1 month    Fever: no     Chills/Sweats: no     Headache (location?): YES    Sinus Pressure:YES    Conjunctivitis:  YES: both    Ear Pain: no    Rhinorrhea: YES    Congestion: YES    Sore Throat: no      Cough: YES    Wheeze: YES    Decreased Appetite: no     Nausea: YES    Vomiting: no     Diarrhea:  YES    Dysuria/Freq.: no     Fatigue/Achiness: no     Sick/Strep Exposure: YES     Therapies Tried and outcome: albuterol muxinex Excedrin sudafed       Patient Active Problem List   Diagnosis     Mild persistent asthma     CARDIOVASCULAR SCREENING; LDL GOAL LESS THAN 160     Hirsutism     Alpha-1-antitrypsin deficiency carrier     Multiple respiratory allergies     Plantar fasciitis     S/P complete thyroidectomy     Postoperative hypothyroidism     Postsurgical hypoparathyroidism (H)     IUD (intrauterine device) in place     Hx of papillary thyroid carcinoma     Other hypoparathyroidism (H)     Class 2 obesity due to excess calories without serious comorbidity with body mass index (BMI) of 39.0 to 39.9 in adult     BRETT (obstructive sleep apnea)- mild (AHI 10)     Past Surgical History:   Procedure Laterality Date     COLONOSCOPY WITH CO2 INSUFFLATION N/A 7/3/2019    Procedure: COLONOSCOPY, WITH CO2 INSUFFLATION;  Surgeon: Fabiana Ramos MD;  Location: [facility] OR     DILATION AND CURETTAGE, HYSTEROSCOPY DIAGNOSTIC, COMBINED N/A 07/13/2017     DILATION AND CURETTAGE, OPERATIVE HYSTEROSCOPY, COMBINED N/A 7/13/2017    Procedure: COMBINED DILATION AND CURETTAGE, OPERATIVE HYSTEROSCOPY;  Hysteroscopy, D&C, Mirena insertion;  Surgeon: Sierra Lane DO;   Location: [facility] OR     HERNIA REPAIR, INGUINAL RT/LT  infant     INSERT INTRAUTERINE DEVICE N/A 7/13/2017    Procedure: INSERT INTRAUTERINE DEVICE;;  Surgeon: Sierra Lane DO;  Location: [facility] OR     THYROIDECTOMY  8/8/2012    Procedure: THYROIDECTOMY;  Total Thyroidectomy and Central Neck Dissection;  Surgeon: Philomena Ruiz MD;  Location: [facility] OR       Social History     Tobacco Use     Smoking status: Former Smoker     Smokeless tobacco: Never Used   Substance Use Topics     Alcohol use: Yes     Comment: 0-1 per month      Family History   Problem Relation Age of Onset     Asthma Father      Cancer Father         cirrhosis and liver cancer     Genetic Disorder Father         alpha 1 antitrypsin deficiency     Thyroid Disease Mother         also on blood thinner. unclear history         Current Outpatient Medications   Medication Sig Dispense Refill     albuterol (PROAIR HFA/PROVENTIL HFA/VENTOLIN HFA) 108 (90 Base) MCG/ACT inhaler Inhale 2 puffs into the lungs every 4 hours as needed for shortness of breath / dyspnea 1 Inhaler 6     benzonatate (TESSALON) 100 MG capsule Take 1 capsule (100 mg) by mouth 3 times daily as needed for cough 15 capsule 0     calcitRIOL (ROCALTROL) 0.25 MCG capsule Take 1 capsule (0.25 mcg) by mouth 2 times daily 180 capsule 3     calcium 600 MG tablet Take 2 tablets by mouth 2 times daily       Fluticasone Propionate (FLONASE NA) Spray 1 spray in nostril daily       fluticasone-salmeterol (ADVAIR DISKUS) 250-50 MCG/DOSE inhaler INHALE ONE PUFF BY MOUTH TWICE DAILY 3 Inhaler 3     hydrocortisone (ANUSOL-HC) 2.5 % cream Place 1 g rectally 2 times daily as needed for hemorrhoids 30 g 5     levonorgestrel (MIRENA, 52 MG,) 20 MCG/24HR IUD 1 each by Intrauterine route once       levothyroxine (SYNTHROID/LEVOTHROID) 200 MCG tablet Take 1 tablet (200 mcg) by mouth daily 90 tablet 3     loratadine (CLARITIN) 10 MG tablet Take 10 mg by mouth daily       montelukast (SINGULAIR) 10 MG  tablet Take 1 tablet (10 mg) by mouth daily 90 tablet 3     omeprazole (PRILOSEC) 10 MG DR capsule Take 20 mg by mouth every other day       predniSONE (DELTASONE) 50 MG tablet Take 1 tablet (50 mg) by mouth daily 5 tablet 0     Vitamin D, Cholecalciferol, 1000 units CAPS        No Known Allergies  Recent Labs   Lab Test 06/24/19  1106 01/04/19  1341  07/10/17  1041 04/14/17  1034  04/29/16  1056  11/05/14  1300   A1C  --   --   --   --   --   --   --   --  5.3   LDL  --  127*  --   --  93  --  105*   < >  --    HDL  --  44*  --   --  51  --  49*   < >  --    TRIG  --  155*  --   --  127  --  76   < >  --    ALT  --  26  --   --  21  --   --   --   --    CR 0.98 1.11*   < > 1.03 0.94  --  0.91   < >  --    GFRESTIMATED 67 58*   < > 57* 63  --  66   < >  --    GFRESTBLACK 78 67   < > 69 76  --  80   < >  --    POTASSIUM  --  3.9  --  3.7 4.1  --  3.7   < >  --    TSH 0.66 0.72   < >  --   --    < >  --    < >  --     < > = values in this interval not displayed.      BP Readings from Last 3 Encounters:   12/10/19 136/84   11/27/19 129/88   09/27/19 119/81    Wt Readings from Last 3 Encounters:   12/10/19 113 kg (249 lb 3.2 oz)   11/27/19 110.7 kg (244 lb)   09/27/19 110.2 kg (243 lb)            Reviewed and updated as needed this visit by Provider         Review of Systems   ROS COMP: Constitutional, HEENT, cardiovascular, pulmonary, gi and gu systems are negative, except as otherwise noted.      Objective    /84 (BP Location: Left arm, Patient Position: Sitting, Cuff Size: Adult Large)   Pulse 77   Temp 99.3  F (37.4  C) (Temporal)   Resp 16   Wt 113 kg (249 lb 3.2 oz)   SpO2 95%   BMI 39.03 kg/m    Body mass index is 39.03 kg/m .  Physical Exam   GENERAL: healthy, alert and no distress  EYES: Eyes grossly normal to inspection, PERRL and conjunctivae and sclerae normal  HENT: ear canals and TM's normal, nose and mouth without ulcers or lesions  NECK: no adenopathy, no asymmetry, masses, or scars and  thyroid normal to palpation  RESP: lungs clear to auscultation - no rales, rhonchi or wheezes  CV: regular rate and rhythm, normal S1 S2, no S3 or S4, no murmur, click or rub, no peripheral edema and peripheral pulses strong  NEURO: Normal strength and tone, mentation intact and speech normal  PSYCH: mentation appears normal, affect normal/bright    Diagnostic Test Results:  Labs reviewed in Epic        ASSESSMENT and PLAN  1. Viral URI with cough  49-year-old female with history of asthma here for 1 month history of upper respiratory and sinus issues.  Feels like her asthma is exacerbated primarily at night.  Following her asthma action plan, has required slight increase in albuterol use.  Examination today was reassuring.  She works as 1/5  and is anxious to get back to work.  Recommended rest, good hydration, anticipate improvement over the next 24 hours.  If no improvement given prednisone burst that has been added to her asthma action plan.  - predniSONE (DELTASONE) 50 MG tablet; Take 1 tablet (50 mg) by mouth daily  Dispense: 5 tablet; Refill: 0  - benzonatate (TESSALON) 100 MG capsule; Take 1 capsule (100 mg) by mouth 3 times daily as needed for cough  Dispense: 15 capsule; Refill: 0    2. Mild persistent asthma without complication  See above.  - predniSONE (DELTASONE) 50 MG tablet; Take 1 tablet (50 mg) by mouth daily  Dispense: 5 tablet; Refill: 0  - benzonatate (TESSALON) 100 MG capsule; Take 1 capsule (100 mg) by mouth 3 times daily as needed for cough  Dispense: 15 capsule; Refill: 0    3. BRETT (obstructive sleep apnea)- mild (AHI 10)  Currently well controlled without CPAP    Return in about 3 months (around 3/10/2020) for Annual Well Check.     Heriberto Wagner MD, FAAFP  Family Medicine Physician  [facility] [place]  [address] [place] [address], [place] [address]

## 2019-12-11 ASSESSMENT — ASTHMA QUESTIONNAIRES: ACT_TOTALSCORE: 19

## 2019-12-18 ENCOUNTER — OFFICE VISIT (OUTPATIENT)
Dept: FAMILY MEDICINE | Facility: CLINIC | Age: 49
End: 2019-12-18
Payer: COMMERCIAL

## 2019-12-18 ENCOUNTER — ANCILLARY PROCEDURE (OUTPATIENT)
Dept: GENERAL RADIOLOGY | Facility: CLINIC | Age: 49
End: 2019-12-18
Attending: FAMILY MEDICINE
Payer: COMMERCIAL

## 2019-12-18 VITALS
HEIGHT: 66 IN | DIASTOLIC BLOOD PRESSURE: 84 MMHG | OXYGEN SATURATION: 95 % | HEART RATE: 72 BPM | BODY MASS INDEX: 39.47 KG/M2 | RESPIRATION RATE: 16 BRPM | WEIGHT: 245.6 LBS | SYSTOLIC BLOOD PRESSURE: 122 MMHG | TEMPERATURE: 97.7 F

## 2019-12-18 DIAGNOSIS — R05.9 COUGH: ICD-10-CM

## 2019-12-18 DIAGNOSIS — J02.9 SORE THROAT: ICD-10-CM

## 2019-12-18 DIAGNOSIS — J45.41 MODERATE PERSISTENT ASTHMA WITH ACUTE EXACERBATION: Primary | ICD-10-CM

## 2019-12-18 LAB
DEPRECATED S PYO AG THROAT QL EIA: NORMAL
SPECIMEN SOURCE: NORMAL

## 2019-12-18 PROCEDURE — 71046 X-RAY EXAM CHEST 2 VIEWS: CPT | Mod: FY

## 2019-12-18 PROCEDURE — 87880 STREP A ASSAY W/OPTIC: CPT | Performed by: FAMILY MEDICINE

## 2019-12-18 PROCEDURE — 99214 OFFICE O/P EST MOD 30 MIN: CPT | Performed by: FAMILY MEDICINE

## 2019-12-18 PROCEDURE — 87081 CULTURE SCREEN ONLY: CPT | Performed by: FAMILY MEDICINE

## 2019-12-18 RX ORDER — PREDNISONE 20 MG/1
40 TABLET ORAL DAILY
Qty: 10 TABLET | Refills: 0 | Status: SHIPPED | OUTPATIENT
Start: 2019-12-18 | End: 2019-12-23

## 2019-12-18 RX ORDER — ALBUTEROL SULFATE 90 UG/1
2 AEROSOL, METERED RESPIRATORY (INHALATION) EVERY 4 HOURS PRN
Qty: 1 INHALER | Refills: 6 | Status: SHIPPED | OUTPATIENT
Start: 2019-12-18

## 2019-12-18 RX ORDER — AZITHROMYCIN 250 MG/1
TABLET, FILM COATED ORAL
Qty: 6 TABLET | Refills: 0 | Status: SHIPPED | OUTPATIENT
Start: 2019-12-18 | End: 2019-12-23

## 2019-12-18 ASSESSMENT — MIFFLIN-ST. JEOR: SCORE: 1749.91

## 2019-12-18 NOTE — PATIENT INSTRUCTIONS
Important Takeaway Points From This Visit:    I have given you a prescription for prednisone to take 40 mg daily for 5 days.     Also take the Z-pack for 5 days. Take 2 pills the first day and 1 pill after that for the next 4 days.      As always, please call with any questions or concerns. I look forward to seeing you again soon!    Take care,  Dr. Oconnor    Your current medication list is printed. Please keep this with you - it is helpful to bring this current list to any other medical appointments. It can also be helpful if you ever go to the emergency room or hospital.    If you had lab testing today we will call you with the results. The phone number we will call with your results is # [phone] (home) [phone] (work). If this is not the best number please call our clinic and change the number.    If you need any refills, please call your pharmacy and they will contact us.    If you have any further concerns or wish to schedule another appointment, please call our office at [phone].    If you have a medical emergency, please call [phone].    Thank you for coming to [facility]!

## 2019-12-18 NOTE — PROGRESS NOTES
Subjective     Kami Natarajan is a 49 year old female who presents to clinic today for the following health issues:    HPI   Acute Illness   Acute illness concerns: cough  Onset: over a week    Fever: no     Chills/Sweats: YES    Headache (location?): YES    Sinus Pressure:YES    Conjunctivitis:  no    Ear Pain: YES: left    Rhinorrhea: YES    Congestion: YES    Sore Throat: YES     Cough: YES-non-productive    Wheeze: YES    Decreased Appetite: YES    Nausea: no     Vomiting: no     Diarrhea:  YES    Dysuria/Freq.: no     Fatigue/Achiness: YES    Sick/Strep Exposure: YES     Therapies Tried and outcome: Benzonatate, Prednisone    Patient was previously seen on 12/10/2019 viral URI with cough as well as asthma exacerbation by my colleague Dr. Heriberto Wagner.  Please see his note for further details.    She states she initially had some improvement with the prednisone; however her symptoms are now worse.  She is a nonproductive dry cough and diffuse wheezing.  She does feel occasionally short of breath.  She has a sore throat, nasal congestion, rhinorrhea, headache.  She is a  and has been exposed to strep as well as influenza.    She has been sick for over a week now.  She is wondering what else she can do.    She denies any chest pain.  She is continuously using her albuterol inhaler and would like a refill.  She is also on Advair, Claritin, and Singulair for controller medications    Patient Active Problem List   Diagnosis     Mild persistent asthma     CARDIOVASCULAR SCREENING; LDL GOAL LESS THAN 160     Hirsutism     Alpha-1-antitrypsin deficiency carrier     Multiple respiratory allergies     Plantar fasciitis     S/P complete thyroidectomy     Postoperative hypothyroidism     Postsurgical hypoparathyroidism (H)     IUD (intrauterine device) in place     Hx of papillary thyroid carcinoma     Other hypoparathyroidism (H)     Class 2 obesity due to excess calories without serious comorbidity with body  mass index (BMI) of 39.0 to 39.9 in adult     BRETT (obstructive sleep apnea)- mild (AHI 10)     Past Surgical History:   Procedure Laterality Date     COLONOSCOPY WITH CO2 INSUFFLATION N/A 7/3/2019    Procedure: COLONOSCOPY, WITH CO2 INSUFFLATION;  Surgeon: Fabiana Ramos MD;  Location: [facility] OR     DILATION AND CURETTAGE, HYSTEROSCOPY DIAGNOSTIC, COMBINED N/A 07/13/2017     DILATION AND CURETTAGE, OPERATIVE HYSTEROSCOPY, COMBINED N/A 7/13/2017    Procedure: COMBINED DILATION AND CURETTAGE, OPERATIVE HYSTEROSCOPY;  Hysteroscopy, D&C, Mirena insertion;  Surgeon: Sierra Lane DO;  Location: [facility] OR     HERNIA REPAIR, INGUINAL RT/LT  infant     INSERT INTRAUTERINE DEVICE N/A 7/13/2017    Procedure: INSERT INTRAUTERINE DEVICE;;  Surgeon: Sierra Lane DO;  Location: [facility]     THYROIDECTOMY  8/8/2012    Procedure: THYROIDECTOMY;  Total Thyroidectomy and Central Neck Dissection;  Surgeon: Philomena Ruiz MD;  Location: [facility] OR       Social History     Tobacco Use     Smoking status: Former Smoker     Smokeless tobacco: Never Used   Substance Use Topics     Alcohol use: Yes     Comment: 0-1 per month      Family History   Problem Relation Age of Onset     Asthma Father      Cancer Father         cirrhosis and liver cancer     Genetic Disorder Father         alpha 1 antitrypsin deficiency     Thyroid Disease Mother         also on blood thinner. unclear history         Current Outpatient Medications   Medication Sig Dispense Refill     albuterol (PROAIR HFA/PROVENTIL HFA/VENTOLIN HFA) 108 (90 Base) MCG/ACT inhaler Inhale 2 puffs into the lungs every 4 hours as needed for shortness of breath / dyspnea 1 Inhaler 6     azithromycin (ZITHROMAX) 250 MG tablet Take 2 tablets (500 mg) by mouth daily for 1 day, THEN 1 tablet (250 mg) daily for 4 days. 6 tablet 0     benzonatate (TESSALON) 100 MG capsule Take 1 capsule (100 mg) by mouth 3 times daily as needed for cough 15 capsule 0     calcitRIOL (ROCALTROL)  "0.25 MCG capsule Take 1 capsule (0.25 mcg) by mouth 2 times daily 180 capsule 3     calcium 600 MG tablet Take 2 tablets by mouth 2 times daily       Fluticasone Propionate (FLONASE NA) Spray 1 spray in nostril daily       fluticasone-salmeterol (ADVAIR DISKUS) 250-50 MCG/DOSE inhaler INHALE ONE PUFF BY MOUTH TWICE DAILY 3 Inhaler 3     hydrocortisone (ANUSOL-HC) 2.5 % cream Place 1 g rectally 2 times daily as needed for hemorrhoids 30 g 5     levonorgestrel (MIRENA, 52 MG,) 20 MCG/24HR IUD 1 each by Intrauterine route once       levothyroxine (SYNTHROID/LEVOTHROID) 200 MCG tablet Take 1 tablet (200 mcg) by mouth daily 90 tablet 3     loratadine (CLARITIN) 10 MG tablet Take 10 mg by mouth daily       montelukast (SINGULAIR) 10 MG tablet Take 1 tablet (10 mg) by mouth daily 90 tablet 3     omeprazole (PRILOSEC) 10 MG DR capsule Take 20 mg by mouth every other day       predniSONE (DELTASONE) 20 MG tablet Take 2 tablets (40 mg) by mouth daily for 5 days 10 tablet 0     Vitamin D, Cholecalciferol, 1000 units CAPS        No Known Allergies    Reviewed and updated as needed this visit by Provider  Tobacco  Allergies  Meds  Problems  Med Hx  Surg Hx  Fam Hx       Review of Systems   ROS COMP: Constitutional, HEENT, lymph, cardiovascular, pulmonary, gi systems are negative, except as otherwise noted.      Objective    /84   Pulse 72   Temp 97.7  F (36.5  C) (Temporal)   Resp 16   Ht 1.675 m (5' 5.95\")   Wt 111.4 kg (245 lb 9.6 oz)   SpO2 95%   BMI 39.71 kg/m    Body mass index is 39.71 kg/m .  Physical Exam   General: Appears ill, but in no acute distress.  Cardiovascular: Regular rate and rhythm, normal S1 and S2 without murmur. No extra heartsounds or friction rub. Radial pulses present and equal bilaterally.  Respiratory: Diffuse wheezing across all lung fields. No crackles.  Musculoskeletal: No gross extremity deformities. No peripheral edema. Normal muscle bulk.     Diagnostic Test Results:  Labs " reviewed in Epic  Results for orders placed or performed in visit on 12/18/19 (from the past 24 hour(s))   Strep, Rapid Screen   Result Value Ref Range    Specimen Description Throat     Rapid Strep A Screen       NEGATIVE: No Group A streptococcal antigen detected by immunoassay, await culture report.     CXR -chest x-ray does not show any signs of acute infiltrate, pneumothorax, fracture, or other abnormality.  Overall appears normal.  Await final radiology review and this is my personal read.        Assessment & Plan   1. Moderate persistent asthma with acute exacerbation: Diffusely wheezy on exam.  Oxygenating well and does not appear acutely ill for I believe she is safe for outpatient management.  Will repeat oral prednisone burst as well as off her azithromycin given asthma for any concern of bronchitis as well as anti-inflammatory.  No infiltrate seen on exam concerning for lobar pneumonia.  Follow-up if not breathing.  Refill of albuterol given.  Continue controller inhalers.  Should follow-up for repeat ACT when improved.  - albuterol (PROAIR HFA/PROVENTIL HFA/VENTOLIN HFA) 108 (90 Base) MCG/ACT inhaler; Inhale 2 puffs into the lungs every 4 hours as needed for shortness of breath / dyspnea  Dispense: 1 Inhaler; Refill: 6  - predniSONE (DELTASONE) 20 MG tablet; Take 2 tablets (40 mg) by mouth daily for 5 days  Dispense: 10 tablet; Refill: 0  - azithromycin (ZITHROMAX) 250 MG tablet; Take 2 tablets (500 mg) by mouth daily for 1 day, THEN 1 tablet (250 mg) daily for 4 days.  Dispense: 6 tablet; Refill: 0    2. Cough: Chest x-ray clear as above.  - XR Chest 2 Views; Future  - Beta strep group A culture    3. Sore throat: Rapid strep on arrival negative.  Culture pending.  Call with results when available.  - Strep, Rapid Screen  - Beta strep group A culture     Return in about 1 week (around 12/25/2019), or if symptoms worsen or fail to improve.    Gen Oconnor MD  [facility]  Medicine     This chart is completed utilizing dictation software; typos and/or incorrect word substitutions may unintentionally occur.

## 2019-12-19 LAB
BACTERIA SPEC CULT: NORMAL
SPECIMEN SOURCE: NORMAL

## 2019-12-23 DIAGNOSIS — E83.51 HYPOCALCEMIA: ICD-10-CM

## 2019-12-23 DIAGNOSIS — Z85.850 HX OF PAPILLARY THYROID CARCINOMA: ICD-10-CM

## 2019-12-23 DIAGNOSIS — E89.0 POSTOPERATIVE HYPOTHYROIDISM: ICD-10-CM

## 2019-12-23 LAB
ALBUMIN SERPL-MCNC: 3.4 G/DL (ref 3.4–5)
CALCIUM SERPL-MCNC: 8.1 MG/DL (ref 8.5–10.1)
T4 FREE SERPL-MCNC: 1.48 NG/DL (ref 0.76–1.46)
TSH SERPL DL<=0.005 MIU/L-ACNC: 0.3 MU/L (ref 0.4–4)

## 2019-12-23 PROCEDURE — 82310 ASSAY OF CALCIUM: CPT | Performed by: INTERNAL MEDICINE

## 2019-12-23 PROCEDURE — 82040 ASSAY OF SERUM ALBUMIN: CPT | Performed by: INTERNAL MEDICINE

## 2019-12-23 PROCEDURE — 36415 COLL VENOUS BLD VENIPUNCTURE: CPT | Performed by: INTERNAL MEDICINE

## 2019-12-23 PROCEDURE — 84439 ASSAY OF FREE THYROXINE: CPT | Performed by: INTERNAL MEDICINE

## 2019-12-23 PROCEDURE — 84443 ASSAY THYROID STIM HORMONE: CPT | Performed by: INTERNAL MEDICINE

## 2019-12-26 NOTE — RESULT ENCOUNTER NOTE
Kami,     The Thyroid blood work results are within the desired range from thyroid cancer history stand point. The target TSH from thyroid cancer stand point is keeping it at about 0.5 and the result was slightly lower than this target range.  Please continue your current dose of levothyroxine without any change.      The calcium was low.  Are you currently taking your calcium supplement and calcitriol?  Please let us know the doses of these medications you are currently taking.     Please let me know if you have any questions.     Owen Handley MD

## 2019-12-31 ENCOUNTER — TELEPHONE (OUTPATIENT)
Dept: ENDOCRINOLOGY | Facility: CLINIC | Age: 49
End: 2019-12-31

## 2019-12-31 DIAGNOSIS — Z85.850 HX OF PAPILLARY THYROID CARCINOMA: ICD-10-CM

## 2019-12-31 DIAGNOSIS — E89.0 POSTOPERATIVE HYPOTHYROIDISM: Primary | ICD-10-CM

## 2019-12-31 DIAGNOSIS — E20.89 OTHER HYPOPARATHYROIDISM (H): ICD-10-CM

## 2019-12-31 NOTE — TELEPHONE ENCOUNTER
"----- Message from Owen Handley MD sent at 12/31/2019  9:41 AM CST -----  Looks like patient has not seen my chart message documented below.  Please call and advise.    \"Kami,     The Thyroid blood work results are within the desired range from thyroid cancer history stand point. The target TSH from thyroid cancer stand point is keeping it at about 0.5 and the result was slightly lower than this target range.  Please continue your current dose of levothyroxine without any change.  We will check blood work again at the time of her follow-up appointment.    The calcium was low.  Are you currently taking your calcium supplement and calcitriol?  Please let us know the doses of these medications you are currently taking. \"  "

## 2019-12-31 NOTE — TELEPHONE ENCOUNTER
Patient advised of Dr. Handley's recommendations. Patient verbalizes understanding and agrees to plan. Patient notes that she has never  her levothyroxine from her calcium. Patient notes that she takes her levothyroxine in the morning with all her other medications and then eats breakfast. Currently thyroid labs are in range with no change by Dr. Handley.    Will send to Dr. Handley to review. Will also determine if Dr. Handley wants labs prior to office visit on 6/22/20. Thyroglobulin would need to be done 2 weeks prior to appointment.       Will review with Dr. Handley.     Johanna Rodríguez RN  Endocrine Care Coordinator  [facility]

## 2019-12-31 NOTE — TELEPHONE ENCOUNTER
Calcium tablet - if it is calcium carbonate - needs to be taken with meals.  Please advise to continue current dose of calcitriol and increase calcium supplement to 600 mg tablet -2 tablets with meals three times per day.

## 2019-12-31 NOTE — TELEPHONE ENCOUNTER
Patient advised of results and recommendations from Dr. Handley. Patient verbalizes understanding and agrees to plan.     Patient confirms that she is taking Calcitriol 0.25 mg twice daily, Calcium 600 mg, taking two tablets twice daily and vitamin d3 1,000 international units once daily.      Will send to Dr. Handley to review.       Johanna Rodríguez RN  Endocrine Care Coordinator  [facility]

## 2020-01-06 NOTE — TELEPHONE ENCOUNTER
Per Dr. Handley:    Labs to be done on the day of visit (not before); in case I want to add on based on symptoms.      Patient advised. Patient verbalizes understanding and agrees to plan. Patient would prefer then to just have labs after office visit with Dr. Handley.      Johanna Rodríguez, RN  Endocrine Care Coordinator  [facility]

## 2020-01-20 ENCOUNTER — TELEPHONE (OUTPATIENT)
Dept: FAMILY MEDICINE | Facility: CLINIC | Age: 50
End: 2020-01-20

## 2020-01-20 DIAGNOSIS — J45.30 MILD PERSISTENT ASTHMA WITHOUT COMPLICATION: ICD-10-CM

## 2020-01-20 NOTE — LETTER
[facility]  [address], [address]  [place] [address]  Phone: [phone]  Fax: [phone]  January 20, 2020      Kami Bjorgan  [address] [address]      Dear Kami,    We care about your health and have reviewed your health plan including your medical conditions, medications, and lab results.  Based on this review, it is recommended that you follow up regarding the following health topic(s):  -Asthma    We recommend you take the following action(s):   -Complete and return the attached ASTHMA CONTROL TEST.  If your total score is 19 or less or you have been to the ER or urgent care for your asthma, then please schedule an asthma followup appointment.     Please call us at the [facility] - [phone] (or use [employer] to address the above recommendations.     Thank you for trusting [facility] and we appreciate the opportunity to serve you.  We look forward to supporting your healthcare needs in the future.    Healthy Regards,    Your Health Care Team  [facility]

## 2020-01-20 NOTE — TELEPHONE ENCOUNTER
Summary:    Patient is due/failing the following:   ACT  Patient is seen by Endo  Action needed:   Patient needs to do ACT.    Type of outreach:    Sent letter.    Questions for provider review:    None                                                                                                                                    Sary Woo CMA       Chart routed to Care Team .

## 2020-01-21 NOTE — TELEPHONE ENCOUNTER
Pending Prescriptions:                       Disp   Refills    montelukast (SINGULAIR) 10 MG tablet [Phar*90 tab*2        Sig: Take 1 tablet (10 mg) by mouth daily    Routing refill request to provider for review/approval because:  ACT Total Scores 12/10/2019   ACT TOTAL SCORE -   ASTHMA ER VISITS -   ASTHMA HOSPITALIZATIONS -   ACT TOTAL SCORE (Goal Greater than or Equal to 20) 19   In the past 12 months, how many times did you visit the emergency room for your asthma without being admitted to the hospital? 0   In the past 12 months, how many times were you hospitalized overnight because of your asthma? 0

## 2020-01-22 RX ORDER — MONTELUKAST SODIUM 10 MG/1
TABLET ORAL
Qty: 90 TABLET | Refills: 0 | Status: SHIPPED | OUTPATIENT
Start: 2020-01-22 | End: 2020-05-04

## 2020-01-24 NOTE — TELEPHONE ENCOUNTER
Left message for patient to call clinic. Please assist with updating ACT. Previous letter with attached ACT sent to patient.    Sary Woo CMA

## 2020-01-28 ASSESSMENT — ASTHMA QUESTIONNAIRES: ACT_TOTALSCORE: 21

## 2020-05-03 DIAGNOSIS — J45.30 MILD PERSISTENT ASTHMA WITHOUT COMPLICATION: ICD-10-CM

## 2020-05-04 RX ORDER — MONTELUKAST SODIUM 10 MG/1
TABLET ORAL
Qty: 90 TABLET | Refills: 0 | Status: SHIPPED | OUTPATIENT
Start: 2020-05-04 | End: 2020-07-30

## 2020-05-04 NOTE — TELEPHONE ENCOUNTER
Prescription approved per [facility] Refill Protocol.    Frances Melendez, RN, BSN

## 2020-07-02 DIAGNOSIS — E89.0 S/P COMPLETE THYROIDECTOMY: ICD-10-CM

## 2020-07-02 DIAGNOSIS — E89.0 POSTOPERATIVE HYPOTHYROIDISM: ICD-10-CM

## 2020-07-02 DIAGNOSIS — E83.51 HYPOCALCEMIA: ICD-10-CM

## 2020-07-06 RX ORDER — LEVOTHYROXINE SODIUM 200 UG/1
200 TABLET ORAL DAILY
Qty: 30 TABLET | Refills: 1 | Status: SHIPPED | OUTPATIENT
Start: 2020-07-06 | End: 2020-10-27

## 2020-07-06 NOTE — TELEPHONE ENCOUNTER
Last Clinic Visit: 6/24/2019  [facility]  Abnormal TSH: see notes 12-23-19, 12-31-19  NCV: 8-18-20  RF 30 day:1

## 2020-07-30 ENCOUNTER — MYC MEDICAL ADVICE (OUTPATIENT)
Dept: FAMILY MEDICINE | Facility: OTHER | Age: 50
End: 2020-07-30

## 2020-07-30 DIAGNOSIS — J45.30 MILD PERSISTENT ASTHMA WITHOUT COMPLICATION: ICD-10-CM

## 2020-07-30 RX ORDER — MONTELUKAST SODIUM 10 MG/1
TABLET ORAL
Qty: 90 TABLET | Refills: 0 | Status: SHIPPED | OUTPATIENT
Start: 2020-07-30 | End: 2020-07-31

## 2020-07-30 NOTE — PROGRESS NOTES
"Kami Natarajan is a 50 year old female who is being evaluated via a billable telephone visit.      The patient has been notified of following:     \"This telephone visit will be conducted via a call between you and your physician/provider. We have found that certain health care needs can be provided without the need for a physical exam.  This service lets us provide the care you need with a short phone conversation.  If a prescription is necessary we can send it directly to your pharmacy.  If lab work is needed we can place an order for that and you can then stop by our lab to have the test done at a later time.    Telephone visits are billed at different rates depending on your insurance coverage. During this emergency period, for some insurers they may be billed the same as an in-person visit.  Please reach out to your insurance provider with any questions.    If during the course of the call the physician/provider feels a telephone visit is not appropriate, you will not be charged for this service.\"    Patient has given verbal consent for Telephone visit?  {YES-NO  Default Yes:4444::\"Yes\"}    What phone number would you like to be contacted at? ***    How would you like to obtain your AVS? {AVS Preference:703792}    Subjective     Kami Natarajan is a 50 year old female who presents via phone visit today for the following health issues:    HPI    {SUPERLIST (Optional):525830}  {PEDS Chronic and Acute Problems (Optional):087638}     {additonal problems for provider to add (Optional):854006}    {HIST REVIEW/ LINKS 2 (Optional):300449}    Reviewed and updated as needed this visit by Provider         Review of Systems   {ROS COMP (Optional):884568}       Objective   Reported vitals:  There were no vitals taken for this visit.   {GENERAL APPEARANCE:50::\"healthy\",\"alert\",\"no distress\"}  PSYCH: Alert and oriented times 3; coherent speech, normal   rate and volume, able to articulate logical thoughts, able   to abstract " "reason, no tangential thoughts, no hallucinations   or delusions  Her affect is { :4544207::\"normal\"}  RESP: No cough, no audible wheezing, able to talk in full sentences  Remainder of exam unable to be completed due to telephone visits    {Diagnostic Test Results (Optional):586049::\"Diagnostic Test Results:\",\"Labs reviewed in Epic\"}        Assessment/Plan:    {Diagnosis, Associated Orders and Comment:321606}    No follow-ups on file.      Phone call duration:  *** minutes    {signature options:245073}      "

## 2020-07-30 NOTE — TELEPHONE ENCOUNTER
Pending Prescriptions:                       Disp   Refills    montelukast (SINGULAIR) 10 MG tablet [Phar*90 tab*0        Sig: TAKE ONE TABLET BY MOUTH ONE TIME DAILY    Patient is due for med check. Please call and schedule.    Maryana Davis, MSN, RN

## 2020-07-31 ENCOUNTER — VIRTUAL VISIT (OUTPATIENT)
Dept: FAMILY MEDICINE | Facility: OTHER | Age: 50
End: 2020-07-31
Payer: COMMERCIAL

## 2020-07-31 ENCOUNTER — TELEPHONE (OUTPATIENT)
Dept: FAMILY MEDICINE | Facility: CLINIC | Age: 50
End: 2020-07-31

## 2020-07-31 DIAGNOSIS — E89.0 S/P COMPLETE THYROIDECTOMY: ICD-10-CM

## 2020-07-31 DIAGNOSIS — E89.0 POSTOPERATIVE HYPOTHYROIDISM: ICD-10-CM

## 2020-07-31 DIAGNOSIS — J45.41 MODERATE PERSISTENT ASTHMA WITH ACUTE EXACERBATION: Primary | ICD-10-CM

## 2020-07-31 PROCEDURE — 99207 ZZC NO BILLABLE SERVICE THIS VISIT: CPT | Performed by: FAMILY MEDICINE

## 2020-07-31 RX ORDER — MONTELUKAST SODIUM 10 MG/1
1 TABLET ORAL DAILY
Qty: 90 TABLET | Refills: 3 | Status: SHIPPED | OUTPATIENT
Start: 2020-07-31 | End: 2021-06-16

## 2020-07-31 NOTE — TELEPHONE ENCOUNTER
Left message for pt to return our call    0 Return in about 4 weeks (around 8/28/2020) for Annual Well Check.  Please schedule patient for face-to-face annual exam with Pap smear, and removal of IUD

## 2020-07-31 NOTE — PROGRESS NOTES
"Kami Natarajan is a 50 year old female who is being evaluated via a billable video visit.      The patient has been notified of following:     \"This video visit will be conducted via a call between you and your physician/provider. We have found that certain health care needs can be provided without the need for an in-person physical exam.  This service lets us provide the care you need with a video conversation.  If a prescription is necessary we can send it directly to your pharmacy.  If lab work is needed we can place an order for that and you can then stop by our lab to have the test done at a later time.    Video visits are billed at different rates depending on your insurance coverage.  Please reach out to your insurance provider with any questions.    If during the course of the call the physician/provider feels a video visit is not appropriate, you will not be charged for this service.\"    Patient has given verbal consent for Video visit? Yes  How would you like to obtain your AVS? MyChart  If you are dropped from the video visit, the video invite should be resent to: Send to e-mail at: [email or web address]  Will anyone else be joining your video visit? No    Subjective     Kami Natarajan is a 50 year old female who presents today via video visit for the following health issues:    HPI    Asthma Follow-Up    Was ACT completed today?    Yes    ACT Total Scores 7/31/2020   ACT TOTAL SCORE -   ASTHMA ER VISITS -   ASTHMA HOSPITALIZATIONS -   ACT TOTAL SCORE (Goal Greater than or Equal to 20) 23   In the past 12 months, how many times did you visit the emergency room for your asthma without being admitted to the hospital? 0   In the past 12 months, how many times were you hospitalized overnight because of your asthma? 0           Hypothyroidism Follow-up      Since last visit, patient describes the following symptoms: Weight stable, no hair loss, no skin changes, no constipation, no loose stools        Video Start " Time: 1002    Patient Active Problem List   Diagnosis     Mild persistent asthma     CARDIOVASCULAR SCREENING; LDL GOAL LESS THAN 160     Hirsutism     Alpha-1-antitrypsin deficiency carrier     Multiple respiratory allergies     Plantar fasciitis     S/P complete thyroidectomy     Postoperative hypothyroidism     Postsurgical hypoparathyroidism (H)     IUD (intrauterine device) in place     Hx of papillary thyroid carcinoma     Other hypoparathyroidism (H)     Class 2 obesity due to excess calories without serious comorbidity with body mass index (BMI) of 39.0 to 39.9 in adult     BRETT (obstructive sleep apnea)- mild (AHI 10)     Past Surgical History:   Procedure Laterality Date     COLONOSCOPY WITH CO2 INSUFFLATION N/A 7/3/2019    Procedure: COLONOSCOPY, WITH CO2 INSUFFLATION;  Surgeon: Fabiana Ramos MD;  Location: [facility] OR     DILATION AND CURETTAGE, HYSTEROSCOPY DIAGNOSTIC, COMBINED N/A 07/13/2017     DILATION AND CURETTAGE, OPERATIVE HYSTEROSCOPY, COMBINED N/A 7/13/2017    Procedure: COMBINED DILATION AND CURETTAGE, OPERATIVE HYSTEROSCOPY;  Hysteroscopy, D&C, Mirena insertion;  Surgeon: Sierra Lane DO;  Location: [facility] OR     HERNIA REPAIR, INGUINAL RT/LT  infant     INSERT INTRAUTERINE DEVICE N/A 7/13/2017    Procedure: INSERT INTRAUTERINE DEVICE;;  Surgeon: Sierra Lane DO;  Location: [facility] OR     THYROIDECTOMY  8/8/2012    Procedure: THYROIDECTOMY;  Total Thyroidectomy and Central Neck Dissection;  Surgeon: Philomena Ruiz MD;  Location: [facility] OR       Social History     Tobacco Use     Smoking status: Former Smoker     Smokeless tobacco: Never Used   Substance Use Topics     Alcohol use: Yes     Comment: 0-1 per month      Family History   Problem Relation Age of Onset     Asthma Father      Cancer Father         cirrhosis and liver cancer     Genetic Disorder Father         alpha 1 antitrypsin deficiency     Thyroid Disease Mother         also on blood thinner. unclear history          Current Outpatient Medications   Medication Sig Dispense Refill     albuterol (PROAIR HFA/PROVENTIL HFA/VENTOLIN HFA) 108 (90 Base) MCG/ACT inhaler Inhale 2 puffs into the lungs every 4 hours as needed for shortness of breath / dyspnea 1 Inhaler 6     calcitRIOL (ROCALTROL) 0.25 MCG capsule Take 1 capsule (0.25 mcg) by mouth 2 times daily 180 capsule 1     calcium 600 MG tablet Take 2 tablets by mouth 2 times daily       Fluticasone Propionate (FLONASE NA) Spray 1 spray in nostril daily       fluticasone-salmeterol (ADVAIR) 250-50 MCG/DOSE inhaler Inhale 1 puff into the lungs 2 times daily 3 Inhaler 1     levonorgestrel (MIRENA, 52 MG,) 20 MCG/24HR IUD 1 each by Intrauterine route once       levothyroxine (SYNTHROID/LEVOTHROID) 200 MCG tablet Take 1 tablet (200 mcg) by mouth daily Please keep 8-18-20 clinic appt for refills 30 tablet 1     loratadine (CLARITIN) 10 MG tablet Take 10 mg by mouth daily       montelukast (SINGULAIR) 10 MG tablet Take 1 tablet (10 mg) by mouth daily 90 tablet 3     omeprazole (PRILOSEC) 10 MG DR capsule Take 20 mg by mouth every other day       Vitamin D, Cholecalciferol, 1000 units CAPS        No Known Allergies    Reviewed and updated as needed this visit by Provider         Review of Systems   Constitutional, HEENT, cardiovascular, pulmonary, gi and gu systems are negative, except as otherwise noted.      Objective             Physical Exam     GENERAL: Healthy, alert and no distress  EYES: Eyes grossly normal to inspection.  No discharge or erythema, or obvious scleral/conjunctival abnormalities.  RESP: No audible wheeze, cough, or visible cyanosis.  No visible retractions or increased work of breathing.    SKIN: Visible skin clear. No significant rash, abnormal pigmentation or lesions.  NEURO: Cranial nerves grossly intact.  Mentation and speech appropriate for age.  PSYCH: Mentation appears normal, affect normal/bright, judgement and insight intact, normal speech and appearance  well-groomed.      Diagnostic Test Results:  Labs reviewed in Epic        ASSESSMENT and PLAN  1. Moderate persistent asthma with acute exacerbation  50-year-old female with history of moderate asthma, currently well controlled.  She is using her inhalers along with her seasonal allergy medications with good effect.  She is due for a Pap smear, so we will transition this to a face-to-face appointment, no charge for this visit.    2. S/P complete thyroidectomy  She has a follow-up with her endocrinologist next week, endocrinologist has been managing her Synthroid.    3. Postoperative hypothyroidism  See above.         Return in about 4 weeks (around 8/28/2020) for Annual Well Check.     Heriberto Wganer MD, [facility]  Family Medicine Physician  [facility]  [address] [place] [address]          Video-Visit Details    Type of service:  Video Visit    Video End Time:1015    Originating Location (pt. Location): Home    Distant Location (provider location):  [facility]     Platform used for Video Visit: Lilian    Return in about 4 weeks (around 8/28/2020) for Annual Well Check.       Heriberto Wagner MD

## 2020-07-31 NOTE — PATIENT INSTRUCTIONS
Kami,    It was great seeing you in clinic today.  I summarized our discussion and your plan below.  Please let me know if you have any questions or concerns.  Please follow up with me as discussed in clinic or sooner if any worsening or additional concerns.     1. Moderate persistent asthma with acute exacerbation  50-year-old female with history of moderate asthma, currently well controlled.  She is using her inhalers along with her seasonal allergy medications with good effect.  She is due for a Pap smear, so we will transition this to a face-to-face appointment, no charge for this visit.    2. S/P complete thyroidectomy  She has a follow-up with her endocrinologist next week, endocrinologist has been managing her Synthroid.    3. Postoperative hypothyroidism  See above.       Sincerely,  Dr. SOURAV Wagner MD, FAAFP  Family Medicine Physician  [facility]  [address] [place] [address]    Patient Education

## 2020-08-01 ASSESSMENT — ASTHMA QUESTIONNAIRES: ACT_TOTALSCORE: 23

## 2020-08-18 ENCOUNTER — VIRTUAL VISIT (OUTPATIENT)
Dept: ENDOCRINOLOGY | Facility: CLINIC | Age: 50
End: 2020-08-18
Payer: COMMERCIAL

## 2020-08-18 DIAGNOSIS — Z85.850 HX OF PAPILLARY THYROID CARCINOMA: Primary | ICD-10-CM

## 2020-08-18 DIAGNOSIS — E20.89 OTHER HYPOPARATHYROIDISM (H): ICD-10-CM

## 2020-08-18 DIAGNOSIS — E83.51 HYPOCALCEMIA: ICD-10-CM

## 2020-08-18 DIAGNOSIS — E89.0 POSTOPERATIVE HYPOTHYROIDISM: ICD-10-CM

## 2020-08-18 PROCEDURE — 99214 OFFICE O/P EST MOD 30 MIN: CPT | Mod: 95 | Performed by: INTERNAL MEDICINE

## 2020-08-18 ASSESSMENT — ASTHMA QUESTIONNAIRES: ACT_TOTALSCORE: 23

## 2020-08-18 NOTE — LETTER
"    8/18/2020         RE: Kami Natarajan  [address] [place] [address]        Dear Colleague,    Thank you for referring your patient, Kami Natarajan, to the [facility]. Please see a copy of my visit note below.    Kami Natarajan is a 50 year old female who is being evaluated via a billable video visit.      The patient has been notified of following:     \"This video visit will be conducted via a call between you and your physician/provider. We have found that certain health care needs can be provided without the need for an in-person physical exam.  This service lets us provide the care you need with a video conversation.  If a prescription is necessary we can send it directly to your pharmacy.  If lab work is needed we can place an order for that and you can then stop by our lab to have the test done at a later time.    Video visits are billed at different rates depending on your insurance coverage.  Please reach out to your insurance provider with any questions.    If during the course of the call the physician/provider feels a video visit is not appropriate, you will not be charged for this service.\"    Patient has given verbal consent for Video visit? Yes  How would you like to obtain your AVS? MyChart  If you are dropped from the video visit, the video invite should be resent to: Other e-mail: my chart  Will anyone else be joining your video visit? No    Endocrinology Video Visit    Chief Complaint: Follow Up (Thyroid)     Information obtained from:Patient    Subjective:         HPI: Kami Natarajan is a 50 year old female with history of papillary thyroid carcinoma with follicular variant who is here for follow-up of the same.      She was diagnosed with papillary thyroid cancer in 2012 and underwent total thyroidectomy on 8/8/2012.  The full pathology report is documented below.  Pathology was well-differentiated invasive papillary carcinoma, follicular variant with a 2.1 cm on the left side " "and 0.8 cm on the right side.  There was lymphovascular invasion bilaterally.  \"A.  Thyroid, left lobe and isthmus, thyroidectomy:       -  Well-differentiated invasive papillary carcinoma, follicular  variant.       -  Tumor size:  2.1 cm.       -  Tumor is confined to the thyroid and extends to 0.2 cm to the  closest resection margin.       -  Lymphovascular invasion .       -  Two hyperplastic nodules, 0.4 and 0.3 cm in diameter.    B.  Thyroid, right lobe, thyroidectomy:       -  Well-differentiated invasive papillary thyroid carcinoma,  follicular variant.       -  Tumor size:  0.8 cm.       -  Tumor is confined to the thyroid and is 0.1 cm from the nearest  surgical margin.       -  No evidence of lymphovascular invasion.       -  Hyperplastic nodule, 0.2 cm in diameter.  C.  Lymph node and parathyroid, left neck, excision:       -  No evidence of malignancy in three lymph nodes (0/3).       -  Benign parathyroid tissue.  D.  Lymph node, right neck, excision:       -  No evidence of malignancy in one lymph node (0/1).\"    She underwent radioiodine ablation with 131 mCi of I-131 on 1/24/2013.  Post scan showed 2 foci of uptake in the anterior neck which was consistent with residual thyroid tissue.  There was no significant uptake in other parts of the body.    Over the years she has had a thyroglobulin level which was undetectable.  Neck ultrasound also did not show any worrisome lymphadenopathy.    She reports that she is currently on levothyroxine 200 mcg daily.  Reports compliance with medication.  Denies hyper or hypothyroid symptoms including palpitation, GI symptoms, tremor, weight loss or hair/ skin changes.    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  She is currently on calcium 1200 mg twice daily and also calcitriol 0.25 mg twice daily.  Her last calcium labs indicated calcium of 8.1 but has not adjusted dose since then.     She also takes vitamin D " supplement 1000 international unit(s) daily.  Denies any numbness or tingling.  Denies any muscle cramps.    Reports that she is physically active at home.    She has been taking calcium tablet with levothyroxine.    No Known Allergies    Current Outpatient Medications   Medication Sig Dispense Refill     albuterol (PROAIR HFA/PROVENTIL HFA/VENTOLIN HFA) 108 (90 Base) MCG/ACT inhaler Inhale 2 puffs into the lungs every 4 hours as needed for shortness of breath / dyspnea 1 Inhaler 6     calcitRIOL (ROCALTROL) 0.25 MCG capsule Take 1 capsule (0.25 mcg) by mouth 2 times daily 180 capsule 1     calcium 600 MG tablet Take 2 tablets by mouth 2 times daily       Fluticasone Propionate (FLONASE NA) Spray 1 spray in nostril daily       fluticasone-salmeterol (ADVAIR) 250-50 MCG/DOSE inhaler Inhale 1 puff into the lungs 2 times daily 3 Inhaler 1     levonorgestrel (MIRENA, 52 MG,) 20 MCG/24HR IUD 1 each by Intrauterine route once       levothyroxine (SYNTHROID/LEVOTHROID) 200 MCG tablet Take 1 tablet (200 mcg) by mouth daily Please keep 8-18-20 clinic appt for refills 30 tablet 1     loratadine (CLARITIN) 10 MG tablet Take 10 mg by mouth daily       montelukast (SINGULAIR) 10 MG tablet Take 1 tablet (10 mg) by mouth daily 90 tablet 3     omeprazole (PRILOSEC) 10 MG DR capsule Take 20 mg by mouth every other day       Vitamin D, Cholecalciferol, 1000 units CAPS          Review of Systems     8 point review system (Constitutional, HENT, Eyes, Respiratory, Cardiovascular, Gastrointestinal, Genitourinary, Musculoskeletal,Neurological, Psychiatric/Behavioural, Endocrine) is negative or is as per HPI above    Past Medical History:   Diagnosis Date     Hip strain 5/10/2012     Hypocalcemia 9/25/2012     Metrorrhagia 2/14/2014     Thyroid nodule 6/28/2012       Past Surgical History:   Procedure Laterality Date     COLONOSCOPY WITH CO2 INSUFFLATION N/A 7/3/2019    Procedure: COLONOSCOPY, WITH CO2 INSUFFLATION;  Surgeon: Richard  Fabiana WU MD;  Location: [facility] OR     DILATION AND CURETTAGE, HYSTEROSCOPY DIAGNOSTIC, COMBINED N/A 07/13/2017     DILATION AND CURETTAGE, OPERATIVE HYSTEROSCOPY, COMBINED N/A 7/13/2017    Procedure: COMBINED DILATION AND CURETTAGE, OPERATIVE HYSTEROSCOPY;  Hysteroscopy, D&C, Mirena insertion;  Surgeon: Sierra Lane DO;  Location: [facility] OR     HERNIA REPAIR, INGUINAL RT/LT  infant     INSERT INTRAUTERINE DEVICE N/A 7/13/2017    Procedure: INSERT INTRAUTERINE DEVICE;;  Surgeon: Sierra Lane DO;  Location: [facility] OR     THYROIDECTOMY  8/8/2012    Procedure: THYROIDECTOMY;  Total Thyroidectomy and Central Neck Dissection;  Surgeon: Philomena Ruiz MD;  Location: [facility] OR       Family History   Problem Relation Age of Onset     Asthma Father      Cancer Father         cirrhosis and liver cancer     Genetic Disorder Father         alpha 1 antitrypsin deficiency     Thyroid Disease Mother         also on blood thinner. unclear history   Family history of antitrypsin deficiency and she was found to be a carrier for it.  Her last social in January 2018 reveals a stable liver enzymes.    Social History     Socioeconomic History     Marital status:      Spouse name: None     Number of children: 3     Years of education: None     Highest education level: None   Occupational History     Employer: [employer]   Social Needs     Financial resource strain: None     Food insecurity:     Worry: None     Inability: None     Transportation needs:     Medical: None     Non-medical: None   Tobacco Use     Smoking status: Former Smoker     Smokeless tobacco: Never Used   Substance and Sexual Activity     Alcohol use: Yes     Comment: occasionally     Drug use: No     Sexual activity: Yes     Partners: Male     Birth control/protection: Surgical     Comment: vasectomy   Lifestyle     Physical activity:     Days per week: None     Minutes per session: None     Stress: None   Relationships     Social connections:      Talks on phone: None     Gets together: None     Attends [employer] service: None     Active member of club or organization: None     Attends meetings of clubs or organizations: None     Relationship status: None     Intimate partner violence:     Fear of current or ex partner: None     Emotionally abused: None     Physically abused: None     Forced sexual activity: None   Other Topics Concern     Parent/sibling w/ CABG, MI or angioplasty before 65F 55M? No   Social History Narrative     None       Objective:   There were no vitals taken for this visit.  Constitutional: Pleasant no acute cardiopulmonary distress.   EYES: anicteric, normal extra-ocular movements.  Pulmonary/Chest: No wheezing or cough.   Neurological: Alert and oriented.    Psychological: appropriate mood and affect     In House Labs:   Lab Results   Component Value Date    A1C 5.3 11/05/2014       TSH   Date Value Ref Range Status   12/23/2019 0.30 (L) 0.40 - 4.00 mU/L Final   06/24/2019 0.66 0.40 - 4.00 mU/L Final   01/04/2019 0.72 0.40 - 4.00 mU/L Final   01/15/2018 2.14 0.40 - 4.00 mU/L Final   11/21/2016 1.19 0.40 - 4.00 mU/L Final     T4 Free   Date Value Ref Range Status   12/23/2019 1.48 (H) 0.76 - 1.46 ng/dL Final   06/24/2019 1.53 (H) 0.76 - 1.46 ng/dL Final   01/04/2019 1.32 0.76 - 1.46 ng/dL Final   01/15/2018 1.48 (H) 0.76 - 1.46 ng/dL Final   11/21/2016 1.36 0.76 - 1.46 ng/dL Final       Creatinine   Date Value Ref Range Status   06/24/2019 0.98 0.52 - 1.04 mg/dL Final   ]    [facility] Latest Ref Rng & Units 1/4/2019 1/15/2018   CALCIUM 8.5 - 10.1 mg/dL 8.5 8.3 (L)   PHOSPHOROUS 2.5 - 4.5 mg/dL 4.2 3.6   ALBUMIN 3.4 - 5.0 g/dL 3.5 3.8   BUN 7 - 30 mg/dL 8 9   CREATININE 0.52 - 1.04 mg/dL 1.11 (H) 1.02   PARATHYROID HORMONE INTACT 12 - 72 pg/mL       [facility] Latest Ref Rng & Units 7/10/2017 4/14/2017   CALCIUM 8.5 - 10.1 mg/dL 9.1 9.1   PHOSPHOROUS 2.5 - 4.5 mg/dL     ALBUMIN 3.4 - 5.0 g/dL     BUN 7 - 30  mg/dL 11 11   CREATININE 0.52 - 1.04 mg/dL 1.03 0.94   PARATHYROID HORMONE INTACT 12 - 72 pg/mL       ENDO [facility]UM [facility]S-[facility] Latest Ref Rng & Units 11/21/2016 5/9/2016   CALCIUM 8.5 - 10.1 mg/dL 8.1 (L) 8.3 (L)   PHOSPHOROUS 2.5 - 4.5 mg/dL 3.7    ALBUMIN 3.4 - 5.0 g/dL 3.8    BUN 7 - 30 mg/dL     CREATININE 0.52 - 1.04 mg/dL     PARATHYROID HORMONE INTACT 12 - 72 pg/mL 7 (L)          Assessment/Treatment Plan:      History of papillary thyroid carcinoma; follicular variant: diagnosed with papillary thyroid cancer in 2012 and underwent total thyroidectomy on 8/8/2012.  Pathology was well-differentiated invasive papillary carcinoma, follicular variant with a 2.1 cm on the left side and 0.8 cm on the right side.  There was lymphovascular invasion bilaterally. She underwent radioiodine ablation with 131 mCi of I-131 on 1/24/2013.  Post scan showed 2 foci of uptake in the anterior neck which was consistent with residual thyroid tissue.  There was no significant uptake in other parts of the body.  Thyroglobulin level has been undetectable after surgery.  Unremarkable neck ultrasound finding-last done 6/2019.    Check TSH    -Check thyroglobulin level  She has been taking calcium tablet with levothyroxine. Have advised to separate by 4 hours for consistent absorption and check labs a couple of months after making t he aforementioned changes.  I suspect we might need to reduce the levothyroxine dose but would wait until the blood work results.  Clinically euthyroid.  Weight-based calculated levothyroxine dose is around 175 mcg daily.      Hyporcalcemia secondary to hypoparathyroidism: She is currently on calcium 1200 mg twice daily and also calcitriol 0.25 mg twice daily.  Will check calcium level and adjust dose based on results.    Patient Instructions   Kami,  It was a pleasure talking to you.  #1 please separate levothyroxine tablet from calcium supplements by at least 4 hours.  2.  Please continue current dose of  levothyroxine, calcium supplement, calcitriol, and vitamin D.  #3 please check blood work in a couple of months after making the change described under #1.  We will adjust your levothyroxine dose based on the blood work results.  Please let me know if any questions in the meantime.      I will contact the patient with the test results.  Return to clinic in 12 months.    Test and/or medications prescribed today:  Orders Placed This Encounter   Procedures     Thyroglobulin and Antibody (Sendout to [facility])     TSH with free T4 reflex     Basic metabolic panel     Albumin level     25 Hydroxyvitamin D2 and D3         Owen Handley MD  Staff Endocrinologist    [phone]  Division of Endocrinology and Diabetes            Video-Visit Details    Type of service:  Video Visit =  1st VideoStart: 08/18/2020 11:02 am   Stop: 08/18/2020 11:12 am  Originating Location (pt. Location): Home    Distant Location (provider location):  [facility]     Platform used for Video Visit: [facility]            Again, thank you for allowing me to participate in the care of your patient.        Sincerely,        Owen Handley MD

## 2020-08-18 NOTE — PATIENT INSTRUCTIONS
Kami,  It was a pleasure talking to you.  #1 please separate levothyroxine tablet from calcium supplements by at least 4 hours.  2.  Please continue current dose of levothyroxine, calcium supplement, calcitriol, and vitamin D.  #3 please check blood work in a couple of months after making the change described under #1.  We will adjust your levothyroxine dose based on the blood work results.  Please let me know if any questions in the meantime.

## 2020-08-18 NOTE — PROGRESS NOTES
"Kami Natarajan is a 50 year old female who is being evaluated via a billable video visit.      The patient has been notified of following:     \"This video visit will be conducted via a call between you and your physician/provider. We have found that certain health care needs can be provided without the need for an in-person physical exam.  This service lets us provide the care you need with a video conversation.  If a prescription is necessary we can send it directly to your pharmacy.  If lab work is needed we can place an order for that and you can then stop by our lab to have the test done at a later time.    Video visits are billed at different rates depending on your insurance coverage.  Please reach out to your insurance provider with any questions.    If during the course of the call the physician/provider feels a video visit is not appropriate, you will not be charged for this service.\"    Patient has given verbal consent for Video visit? Yes  How would you like to obtain your AVS? MyChart  If you are dropped from the video visit, the video invite should be resent to: Other e-mail: my chart  Will anyone else be joining your video visit? No    Endocrinology Video Visit    Chief Complaint: Follow Up (Thyroid)     Information obtained from:Patient    Subjective:         HPI: Kami Natarajan is a 50 year old female with history of papillary thyroid carcinoma with follicular variant who is here for follow-up of the same.      She was diagnosed with papillary thyroid cancer in 2012 and underwent total thyroidectomy on 8/8/2012.  The full pathology report is documented below.  Pathology was well-differentiated invasive papillary carcinoma, follicular variant with a 2.1 cm on the left side and 0.8 cm on the right side.  There was lymphovascular invasion bilaterally.  \"A.  Thyroid, left lobe and isthmus, thyroidectomy:       -  Well-differentiated invasive papillary carcinoma, follicular  variant.       -  Tumor size:  2.1 " "cm.       -  Tumor is confined to the thyroid and extends to 0.2 cm to the  closest resection margin.       -  Lymphovascular invasion .       -  Two hyperplastic nodules, 0.4 and 0.3 cm in diameter.    B.  Thyroid, right lobe, thyroidectomy:       -  Well-differentiated invasive papillary thyroid carcinoma,  follicular variant.       -  Tumor size:  0.8 cm.       -  Tumor is confined to the thyroid and is 0.1 cm from the nearest  surgical margin.       -  No evidence of lymphovascular invasion.       -  Hyperplastic nodule, 0.2 cm in diameter.  C.  Lymph node and parathyroid, left neck, excision:       -  No evidence of malignancy in three lymph nodes (0/3).       -  Benign parathyroid tissue.  D.  Lymph node, right neck, excision:       -  No evidence of malignancy in one lymph node (0/1).\"    She underwent radioiodine ablation with 131 mCi of I-131 on 1/24/2013.  Post scan showed 2 foci of uptake in the anterior neck which was consistent with residual thyroid tissue.  There was no significant uptake in other parts of the body.    Over the years she has had a thyroglobulin level which was undetectable.  Neck ultrasound also did not show any worrisome lymphadenopathy.    She reports that she is currently on levothyroxine 200 mcg daily.  Reports compliance with medication.  Denies hyper or hypothyroid symptoms including palpitation, GI symptoms, tremor, weight loss or hair/ skin changes.    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  She is currently on calcium 1200 mg twice daily and also calcitriol 0.25 mg twice daily.  Her last calcium labs indicated calcium of 8.1 but has not adjusted dose since then.     She also takes vitamin D supplement 1000 international unit(s) daily.  Denies any numbness or tingling.  Denies any muscle cramps.    Reports that she is physically active at home.    She has been taking calcium tablet with levothyroxine.    No Known " Allergies    Current Outpatient Medications   Medication Sig Dispense Refill     albuterol (PROAIR HFA/PROVENTIL HFA/VENTOLIN HFA) 108 (90 Base) MCG/ACT inhaler Inhale 2 puffs into the lungs every 4 hours as needed for shortness of breath / dyspnea 1 Inhaler 6     calcitRIOL (ROCALTROL) 0.25 MCG capsule Take 1 capsule (0.25 mcg) by mouth 2 times daily 180 capsule 1     calcium 600 MG tablet Take 2 tablets by mouth 2 times daily       Fluticasone Propionate (FLONASE NA) Spray 1 spray in nostril daily       fluticasone-salmeterol (ADVAIR) 250-50 MCG/DOSE inhaler Inhale 1 puff into the lungs 2 times daily 3 Inhaler 1     levonorgestrel (MIRENA, 52 MG,) 20 MCG/24HR IUD 1 each by Intrauterine route once       levothyroxine (SYNTHROID/LEVOTHROID) 200 MCG tablet Take 1 tablet (200 mcg) by mouth daily Please keep 8-18-20 clinic appt for refills 30 tablet 1     loratadine (CLARITIN) 10 MG tablet Take 10 mg by mouth daily       montelukast (SINGULAIR) 10 MG tablet Take 1 tablet (10 mg) by mouth daily 90 tablet 3     omeprazole (PRILOSEC) 10 MG DR capsule Take 20 mg by mouth every other day       Vitamin D, Cholecalciferol, 1000 units CAPS          Review of Systems     8 point review system (Constitutional, HENT, Eyes, Respiratory, Cardiovascular, Gastrointestinal, Genitourinary, Musculoskeletal,Neurological, Psychiatric/Behavioural, Endocrine) is negative or is as per HPI above    Past Medical History:   Diagnosis Date     Hip strain 5/10/2012     Hypocalcemia 9/25/2012     Metrorrhagia 2/14/2014     Thyroid nodule 6/28/2012       Past Surgical History:   Procedure Laterality Date     COLONOSCOPY WITH CO2 INSUFFLATION N/A 7/3/2019    Procedure: COLONOSCOPY, WITH CO2 INSUFFLATION;  Surgeon: Fabiana Ramos MD;  Location: [facility] OR     DILATION AND CURETTAGE, HYSTEROSCOPY DIAGNOSTIC, COMBINED N/A 07/13/2017     DILATION AND CURETTAGE, OPERATIVE HYSTEROSCOPY, COMBINED N/A 7/13/2017    Procedure: COMBINED DILATION AND CURETTAGE,  OPERATIVE HYSTEROSCOPY;  Hysteroscopy, D&C, Mirena insertion;  Surgeon: Sierra Lane DO;  Location: [facility] OR     HERNIA REPAIR, INGUINAL RT/LT  infant     INSERT INTRAUTERINE DEVICE N/A 7/13/2017    Procedure: INSERT INTRAUTERINE DEVICE;;  Surgeon: Sierra Lane DO;  Location: [facility] OR     THYROIDECTOMY  8/8/2012    Procedure: THYROIDECTOMY;  Total Thyroidectomy and Central Neck Dissection;  Surgeon: Philomena Ruiz MD;  Location: [facility] OR       Family History   Problem Relation Age of Onset     Asthma Father      Cancer Father         cirrhosis and liver cancer     Genetic Disorder Father         alpha 1 antitrypsin deficiency     Thyroid Disease Mother         also on blood thinner. unclear history   Family history of antitrypsin deficiency and she was found to be a carrier for it.  Her last social in January 2018 reveals a stable liver enzymes.    Social History     Socioeconomic History     Marital status:      Spouse name: None     Number of children: 3     Years of education: None     Highest education level: None   Occupational History     Employer: [employer]   Social Needs     Financial resource strain: None     Food insecurity:     Worry: None     Inability: None     Transportation needs:     Medical: None     Non-medical: None   Tobacco Use     Smoking status: Former Smoker     Smokeless tobacco: Never Used   Substance and Sexual Activity     Alcohol use: Yes     Comment: occasionally     Drug use: No     Sexual activity: Yes     Partners: Male     Birth control/protection: Surgical     Comment: vasectomy   Lifestyle     Physical activity:     Days per week: None     Minutes per session: None     Stress: None   Relationships     Social connections:     Talks on phone: None     Gets together: None     Attends [employer] service: None     Active member of club or organization: None     Attends meetings of clubs or organizations: None     Relationship status: None     Intimate  partner violence:     Fear of current or ex partner: None     Emotionally abused: None     Physically abused: None     Forced sexual activity: None   Other Topics Concern     Parent/sibling w/ CABG, MI or angioplasty before 65F 55M? No   Social History Narrative     None       Objective:   There were no vitals taken for this visit.  Constitutional: Pleasant no acute cardiopulmonary distress.   EYES: anicteric, normal extra-ocular movements.  Pulmonary/Chest: No wheezing or cough.   Neurological: Alert and oriented.    Psychological: appropriate mood and affect     In House Labs:   Lab Results   Component Value Date    A1C 5.3 11/05/2014       TSH   Date Value Ref Range Status   12/23/2019 0.30 (L) 0.40 - 4.00 mU/L Final   06/24/2019 0.66 0.40 - 4.00 mU/L Final   01/04/2019 0.72 0.40 - 4.00 mU/L Final   01/15/2018 2.14 0.40 - 4.00 mU/L Final   11/21/2016 1.19 0.40 - 4.00 mU/L Final     T4 Free   Date Value Ref Range Status   12/23/2019 1.48 (H) 0.76 - 1.46 ng/dL Final   06/24/2019 1.53 (H) 0.76 - 1.46 ng/dL Final   01/04/2019 1.32 0.76 - 1.46 ng/dL Final   01/15/2018 1.48 (H) 0.76 - 1.46 ng/dL Final   11/21/2016 1.36 0.76 - 1.46 ng/dL Final       Creatinine   Date Value Ref Range Status   06/24/2019 0.98 0.52 - 1.04 mg/dL Final   ]    [facility] Latest Ref Rng & Units 1/4/2019 1/15/2018   CALCIUM 8.5 - 10.1 mg/dL 8.5 8.3 (L)   PHOSPHOROUS 2.5 - 4.5 mg/dL 4.2 3.6   ALBUMIN 3.4 - 5.0 g/dL 3.5 3.8   BUN 7 - 30 mg/dL 8 9   CREATININE 0.52 - 1.04 mg/dL 1.11 (H) 1.02   PARATHYROID HORMONE INTACT 12 - 72 pg/mL       [facility] Latest Ref Rng & Units 7/10/2017 4/14/2017   CALCIUM 8.5 - 10.1 mg/dL 9.1 9.1   PHOSPHOROUS 2.5 - 4.5 mg/dL     ALBUMIN 3.4 - 5.0 g/dL     BUN 7 - 30 mg/dL 11 11   CREATININE 0.52 - 1.04 mg/dL 1.03 0.94   PARATHYROID HORMONE INTACT 12 - 72 pg/mL       [facility] Latest Ref Rng & Units 11/21/2016 5/9/2016   CALCIUM 8.5 - 10.1 mg/dL 8.1 (L) 8.3 (L)   PHOSPHOROUS 2.5 -  4.5 mg/dL 3.7    ALBUMIN 3.4 - 5.0 g/dL 3.8    BUN 7 - 30 mg/dL     CREATININE 0.52 - 1.04 mg/dL     PARATHYROID HORMONE INTACT 12 - 72 pg/mL 7 (L)          Assessment/Treatment Plan:      History of papillary thyroid carcinoma; follicular variant: diagnosed with papillary thyroid cancer in 2012 and underwent total thyroidectomy on 8/8/2012.  Pathology was well-differentiated invasive papillary carcinoma, follicular variant with a 2.1 cm on the left side and 0.8 cm on the right side.  There was lymphovascular invasion bilaterally. She underwent radioiodine ablation with 131 mCi of I-131 on 1/24/2013.  Post scan showed 2 foci of uptake in the anterior neck which was consistent with residual thyroid tissue.  There was no significant uptake in other parts of the body.  Thyroglobulin level has been undetectable after surgery.  Unremarkable neck ultrasound finding-last done 6/2019.    Check TSH    -Check thyroglobulin level  She has been taking calcium tablet with levothyroxine. Have advised to separate by 4 hours for consistent absorption and check labs a couple of months after making t he aforementioned changes.  I suspect we might need to reduce the levothyroxine dose but would wait until the blood work results.  Clinically euthyroid.  Weight-based calculated levothyroxine dose is around 175 mcg daily.      Hyporcalcemia secondary to hypoparathyroidism: She is currently on calcium 1200 mg twice daily and also calcitriol 0.25 mg twice daily.  Will check calcium level and adjust dose based on results.    Patient Instructions   Kami,  It was a pleasure talking to you.  #1 please separate levothyroxine tablet from calcium supplements by at least 4 hours.  2.  Please continue current dose of levothyroxine, calcium supplement, calcitriol, and vitamin D.  #3 please check blood work in a couple of months after making the change described under #1.  We will adjust your levothyroxine dose based on the blood work  results.  Please let me know if any questions in the meantime.      I will contact the patient with the test results.  Return to clinic in 12 months.    Test and/or medications prescribed today:  Orders Placed This Encounter   Procedures     Thyroglobulin and Antibody (Sendout to [facility])     TSH with free T4 reflex     Basic metabolic panel     Albumin level     25 Hydroxyvitamin D2 and D3         Owen Handley MD  Staff Endocrinologist    [phone]  Division of Endocrinology and Diabetes            Video-Visit Details    Type of service:  Video Visit =  1st VideoStart: 08/18/2020 11:02 am   Stop: 08/18/2020 11:12 am  Originating Location (pt. Location): Home    Distant Location (provider location):  [place]     Platform used for Video Visit: Lilian

## 2020-08-28 ENCOUNTER — PREP FOR PROCEDURE (OUTPATIENT)
Dept: OBGYN | Facility: OTHER | Age: 50
End: 2020-08-28

## 2020-08-28 ENCOUNTER — OFFICE VISIT (OUTPATIENT)
Dept: OBGYN | Facility: OTHER | Age: 50
End: 2020-08-28
Payer: COMMERCIAL

## 2020-08-28 ENCOUNTER — TELEPHONE (OUTPATIENT)
Dept: OBGYN | Facility: OTHER | Age: 50
End: 2020-08-28

## 2020-08-28 VITALS
BODY MASS INDEX: 39.62 KG/M2 | WEIGHT: 246.5 LBS | HEART RATE: 76 BPM | SYSTOLIC BLOOD PRESSURE: 120 MMHG | HEIGHT: 66 IN | OXYGEN SATURATION: 100 % | DIASTOLIC BLOOD PRESSURE: 82 MMHG

## 2020-08-28 DIAGNOSIS — N88.8 CERVICAL MASS: ICD-10-CM

## 2020-08-28 DIAGNOSIS — Z00.00 ANNUAL PHYSICAL EXAM: Primary | ICD-10-CM

## 2020-08-28 DIAGNOSIS — Z97.5 IUD (INTRAUTERINE DEVICE) IN PLACE: ICD-10-CM

## 2020-08-28 DIAGNOSIS — N92.1 METRORRHAGIA: ICD-10-CM

## 2020-08-28 DIAGNOSIS — N92.1 METRORRHAGIA: Primary | ICD-10-CM

## 2020-08-28 PROBLEM — E66.01 MORBID OBESITY (H): Status: ACTIVE | Noted: 2020-08-28

## 2020-08-28 PROCEDURE — 99386 PREV VISIT NEW AGE 40-64: CPT | Performed by: OBSTETRICS & GYNECOLOGY

## 2020-08-28 PROCEDURE — 99213 OFFICE O/P EST LOW 20 MIN: CPT | Mod: 25 | Performed by: OBSTETRICS & GYNECOLOGY

## 2020-08-28 ASSESSMENT — MIFFLIN-ST. JEOR: SCORE: 1749.87

## 2020-08-28 NOTE — PATIENT INSTRUCTIONS
PREVENTIVE HEALTH RECOMMENDATIONS:   Get a physical every year.  A pap test is important to have done starting at age 21 and then every three years as long as your pap is normal.  When you receive the results of your pap test it will include when you need to have your next pap test.    You should be tested each year for chlamydia and gonorrhea if you are aged 16-25 and if you have had a new sexual partner since you were last tested.   Vaccines: Get a flu shot each year.   Eat at least 8-10 servings of fruits and vegetables daily.  Eat whole-grain bread and cereal, whole-wheat pasta and brown rice instead of white grains and white rice.   For bone health: Eat calcium-rich foods (dairy products) or take calcium pills (500 to 600 mg with vitamin D) twice a day with food.   Exercise for an average of 30 minutes a day, 5 days of the week. It can be as simple as taking a brisk walk.  This will help you control your weight and prevent many diseases.   Limit alcohol to one drink per day.   Don't smoke and limit your exposure to second hand smoke.  If you smoke consider making a plan to quit. Go to [employer] and clink on   [employer]  for help   Wear sunscreen with at least SPF15 to prevent skin damage and skin cancer.   Brush your teeth twice a day and floss once a day and see your dentist twice a year for an exam and cleaning.   Have a great year and I will look forward to seeing you next year.   Sierra Lane, DO

## 2020-08-28 NOTE — PROGRESS NOTES
Chief Complaint   Patient presents with     Physical     IUD     removal        Subjective  Kami Natarajan is a 50 year old female who presents for her annual exam.  Patient had a Mirena placed in the OR at the time of a HD&C in 2017 for menometrorrhagia.  She hasn't had any vaginal bleeding for many years but over the last 2 weeks she has started to have irregular vaginal bleeding.  She will see small clots and she has enough that she has to wear a pad.  No family history of uterine cancer.  Mom had endometriosis.  No tobacco abuse.  3 NSVDs.  Occasional cramps.  No problems urinating.  Normal bowel movements.  Patient is not sexually active.  No fever or chills.      Most recent pap: 2017  History of abnormal Pap smear:  No  History of STI's:  No  History of PID:  No    Family history of uterine cancer:  No  Family history of ovarian cancer:  No  Family history of colon cancer:  No  Family history of breast cancer:  No    ROS  ROS: 10 point ROS neg other than the symptoms noted above in the HPI.    Past Medical History:   Diagnosis Date     Hip strain 5/10/2012     Hypocalcemia 9/25/2012     Metrorrhagia 2/14/2014     Thyroid nodule 6/28/2012     Past Surgical History:   Procedure Laterality Date     COLONOSCOPY WITH CO2 INSUFFLATION N/A 7/3/2019    Procedure: COLONOSCOPY, WITH CO2 INSUFFLATION;  Surgeon: Fabiana Ramos MD;  Location:  OR     DILATION AND CURETTAGE, HYSTEROSCOPY DIAGNOSTIC, COMBINED N/A 07/13/2017     DILATION AND CURETTAGE, OPERATIVE HYSTEROSCOPY, COMBINED N/A 7/13/2017    Procedure: COMBINED DILATION AND CURETTAGE, OPERATIVE HYSTEROSCOPY;  Hysteroscopy, D&C, Mirena insertion;  Surgeon: Sierra Lane DO;  Location: [facility] OR     HERNIA REPAIR, INGUINAL RT/LT  infant     INSERT INTRAUTERINE DEVICE N/A 7/13/2017    Procedure: INSERT INTRAUTERINE DEVICE;;  Surgeon: Sierra Lane DO;  Location: [facility] OR     THYROIDECTOMY  8/8/2012    Procedure: THYROIDECTOMY;  Total Thyroidectomy and  "Central Neck Dissection;  Surgeon: Philomena Ruiz MD;  Location:  OR     Family History   Problem Relation Age of Onset     Asthma Father      Cancer Father         cirrhosis and liver cancer     Genetic Disorder Father         alpha 1 antitrypsin deficiency     Thyroid Disease Mother         also on blood thinner. unclear history     Social History     Tobacco Use     Smoking status: Former Smoker     Smokeless tobacco: Never Used   Substance Use Topics     Alcohol use: Yes     Comment: 0-1 per month        Tobacco abuse:  No  Do you get at least three servings of calcium containing foods daily (dairy, green leafy vegetables, etc.)? yes   Outside of work or daily activities, how many days per week do you exercise for 30 minutes or longer? 3-4x per week  The patient does not drink >3 drinks per day nor >7 drinks per week.   Have you had an eye exam in the past two years? yes   Do you see a dentist twice per year? yes   Today's PHQ-2 Score:   Abuse: Current or Past(Physical, Sexual or Emotional)- No   Do you feel safe in your environment - Yes  Objective  Vitals: /82 (BP Location: Right arm, Cuff Size: Adult Large)   Pulse 76   Ht 1.676 m (5' 6\")   Wt 111.8 kg (246 lb 8 oz)   SpO2 100%   BMI 39.79 kg/m    BMI= Body mass index is 39.79 kg/m .    General appearance=well developed, well-nourished female  Gait=normal  Psych=mood is stable, alert and oriented x3  HEENT=mucous membranes moist  Skin=no rashes or lesions seen,normal turgor   Breast:  Benign exam, no masses palpated.  No skin changes, no axillary lymphadenopathy, no nipple discharge.  Axilla feel completely normal, no lymph node enlargement and non-tender.  Neck=overall appearance is normal  Heart=RRR, no murmurs, no swelling noted  Thyroid=normal, no masses, no TTP, no enlargement  Lungs=non-labored breathing, no use of accessory muscles, clear to ausculation bilaterally  Abd=soft, Nontender/nondistended, no masses, no signs of hernias, " no evidence of hepatosplenomegaly  PELVIC:    External genitalia: normal without lesions or masses  Urethral meatus: no lesions or prolapse noted, normal size  Urethra: no masses, non tender  Bladder: non tender, no fullness  Vagina: normal mucosa and rugae, no discharge.  Cervix: small possible cyst vs polyp on inferior portion of cervix, no cervical motion tenderness, healthy, multiparous, IUD strings seen coming from os  Uterus: small, mobile, nontender.  Adnexa: non tender, without masses  Rectal: deferred  Ext=no clubbing or cyanosis, no swelling      Last lipid profile: 2019.  Slightly high.  Patient seeing FP   Regular self breast exam: Yes  Most recent mammogram:  2019  History of abnormal mammogram:  No  Fit testin  DEXA:  N/A        Assessment/Plan  1.)  Annual/well woman exam=pap smear, mammogram ([facility])  2.)  Metrorrhagia=HD&C  3.)  Cervical mass=bx  4.)  IUD in place      I originally recommended an endometrial biopsy however it is difficult to visualize patient's cervix and a mass was seen.  Likely cyst however possible polyp.  I recommended a HD&C, cervical biopsy in the OR.  We discussed how the Mirena may be accidentally removed at the time of the procedure and she is ok with this.  I discussed risks, benefits, and complications of surgery including but not limited to bleeding, infection, damage to nearby organs including but not limited to bladder, bowel, ureters, nerves, and blood vessels as well as anesthesia risks.  Injury may result at the time of surgery or in a separate procedure.  We also discussed the possibility of a reoperation if the pathology came back abnormal.  All questions answered, and accepting these risks, the patient elects to proceed with the procedure.        The following topics were discussed or recommended   Discussed seat belt, helmet and sunscreen use  Vision screening   Mammogram   Calcium/Vitamin D supplement=Recommended 1000 mg  of calcium daily and 800 IU of vitamin D.    30 minutes was spent face to face with the patient today discussing her history, diagnosis, and follow-up plan as noted above.  Over 50% of the visit was spent in counseling and coordination of care.    Total Visit Time: 35 minutes        Sierra Lane DO

## 2020-08-29 DIAGNOSIS — E89.0 POSTOPERATIVE HYPOTHYROIDISM: ICD-10-CM

## 2020-08-29 DIAGNOSIS — E89.0 S/P COMPLETE THYROIDECTOMY: ICD-10-CM

## 2020-08-29 DIAGNOSIS — E83.51 HYPOCALCEMIA: ICD-10-CM

## 2020-08-31 ENCOUNTER — MYC MEDICAL ADVICE (OUTPATIENT)
Dept: FAMILY MEDICINE | Facility: CLINIC | Age: 50
End: 2020-08-31

## 2020-08-31 DIAGNOSIS — Z11.59 ENCOUNTER FOR SCREENING FOR OTHER VIRAL DISEASES: Primary | ICD-10-CM

## 2020-08-31 DIAGNOSIS — Z01.812 PRE-PROCEDURE LAB EXAM: Primary | ICD-10-CM

## 2020-08-31 PROBLEM — N88.8 CERVICAL MASS: Status: ACTIVE | Noted: 2020-08-31

## 2020-08-31 PROBLEM — N92.1 METRORRHAGIA: Status: ACTIVE | Noted: 2020-08-31

## 2020-09-01 RX ORDER — CALCITRIOL 0.25 UG/1
CAPSULE, LIQUID FILLED ORAL
Qty: 180 CAPSULE | Refills: 0 | Status: SHIPPED | OUTPATIENT
Start: 2020-09-01 | End: 2020-12-03

## 2020-09-01 NOTE — TELEPHONE ENCOUNTER
Pending Prescriptions:                       Disp   Refills    calcitRIOL (ROCALTROL) 0.25 MCG capsule [P*180 ca*0        Sig: TAKE ONE CAPSULE BY MOUTH TWICE DAILY       Routing refill request to provider for review/approval because:  Drug not on the G refill protocol     Helen Horn RN

## 2020-09-14 ENCOUNTER — TELEPHONE (OUTPATIENT)
Dept: OBGYN | Facility: CLINIC | Age: 50
End: 2020-09-14

## 2020-09-14 DIAGNOSIS — R10.2 PELVIC PAIN IN FEMALE: Primary | ICD-10-CM

## 2020-09-14 NOTE — TELEPHONE ENCOUNTER
"Patient is a 50 year who was seen recently in clinic on 8/28/2020 for her annal exam with Dr. Lane. At this time patient was having irregular vaginal bleeding and occasional cramps. Per the OV notes,        Assessment/Plan  1.)  Annual/well woman exam=pap smear, mammogram ([facility])  2.)  Metrorrhagia=HD&C  3.)  Cervical mass=bx  4.)  IUD in place    ___________________________    Patient is calling nurse triage line because she has noted a worsening change.   Patient is having lower abdomen/pelvic pain. It started Friday overnight, she thought it could have been indigestion so she took some Tums. This was not effective. Saturday and Sunday the pain was constant. Constant cramping rated a 3/10 in severity with an occasional sharp twinge of pain like a twisting,stabbing feeling underneath her belly button that was rated 6/10. Patient reports, \"Something has changed since she last saw Dr. Lane in clinic.\"    Took two ibuprofen yesterday and heating pad on abdomen, with little effectiveness. Woke her up twice over night and felt she needed to call.     Denies vaginal bleeding. Denies constipation.     RN advised to continue use of heating pad to abdomen, alternative with Ibuprofen and Tylenol.   RN will route to provider for further advisement.     Patient verbalized understanding and agreed to plan.       Maryana Uriostegui RN on 9/14/2020 at 9:07 AM    "

## 2020-09-14 NOTE — TELEPHONE ENCOUNTER
RN called and spoke to patient, relaying provider's plan below.     Phone number for ultra sound scheduling provided.     Patient verbalized understanding and agreed to plan.     Maryana Uriostegui RN on 9/14/2020 at 10:39 AM

## 2020-09-14 NOTE — TELEPHONE ENCOUNTER
I want patient to have a pelvic ultrasound.  The order is placed.  I will let her know the results when I have them.      Sierra Lane, DO

## 2020-09-17 ENCOUNTER — ANCILLARY PROCEDURE (OUTPATIENT)
Dept: ULTRASOUND IMAGING | Facility: CLINIC | Age: 50
End: 2020-09-17
Attending: OBSTETRICS & GYNECOLOGY
Payer: COMMERCIAL

## 2020-09-17 DIAGNOSIS — R10.2 PELVIC PAIN IN FEMALE: ICD-10-CM

## 2020-09-17 PROCEDURE — 76830 TRANSVAGINAL US NON-OB: CPT

## 2020-09-17 PROCEDURE — 76856 US EXAM PELVIC COMPLETE: CPT | Mod: 59

## 2020-09-30 ENCOUNTER — OFFICE VISIT (OUTPATIENT)
Dept: FAMILY MEDICINE | Facility: CLINIC | Age: 50
End: 2020-09-30
Payer: COMMERCIAL

## 2020-09-30 VITALS
TEMPERATURE: 97.6 F | RESPIRATION RATE: 18 BRPM | BODY MASS INDEX: 39.89 KG/M2 | SYSTOLIC BLOOD PRESSURE: 110 MMHG | OXYGEN SATURATION: 100 % | WEIGHT: 248.2 LBS | DIASTOLIC BLOOD PRESSURE: 84 MMHG | HEIGHT: 66 IN | HEART RATE: 82 BPM

## 2020-09-30 DIAGNOSIS — Z01.818 PREOP GENERAL PHYSICAL EXAM: Primary | ICD-10-CM

## 2020-09-30 DIAGNOSIS — E66.01 MORBID OBESITY (H): ICD-10-CM

## 2020-09-30 DIAGNOSIS — N88.8 CERVICAL MASS: ICD-10-CM

## 2020-09-30 DIAGNOSIS — G47.33 OSA (OBSTRUCTIVE SLEEP APNEA): Chronic | ICD-10-CM

## 2020-09-30 DIAGNOSIS — J45.30 MILD PERSISTENT ASTHMA WITHOUT COMPLICATION: Chronic | ICD-10-CM

## 2020-09-30 DIAGNOSIS — N92.1 METRORRHAGIA: ICD-10-CM

## 2020-09-30 DIAGNOSIS — E89.0 POSTOPERATIVE HYPOTHYROIDISM: Chronic | ICD-10-CM

## 2020-09-30 PROCEDURE — 99214 OFFICE O/P EST MOD 30 MIN: CPT | Performed by: FAMILY MEDICINE

## 2020-09-30 RX ORDER — IBUPROFEN 200 MG
200 TABLET ORAL EVERY 4 HOURS PRN
COMMUNITY

## 2020-09-30 ASSESSMENT — MIFFLIN-ST. JEOR: SCORE: 1757.58

## 2020-09-30 NOTE — H&P (VIEW-ONLY)
[facility] [place]  [address], [address]  [place] [address]  Phone: [phone]  Fax: [phone]  Primary Provider: Fabiana Sepulveda  Pre-op Performing Provider: FABIANA SEPULVEDA    PREOPERATIVE EVALUATION:  Today's date: 9/30/2020    Kami Natarajan is a 50 year old female who presents for a preoperative evaluation.    Surgical Information:  Surgery Details 9/30/2020   Surgery/Procedure: uterus scraping-DNC, biopsy   Surgery Location: [facility]   Surgeon: Dr. Moran   Surgery Date: 10-8-20   Time of Surgery: 11:00   Where patient plans to recover: At home with family       Type of Anesthesia Anticipated: Local with MAC    Subjective     HPI related to upcoming procedure: patient had issues with vaginal bleeding. Had a lesion noted on her cervix that was bleeding. She has also had pelvic cramping and noted enlargement of a fibroid.       Preop Questions 9/30/2020   1. Have you ever had a heart attack or stroke? No   2. Have you ever had surgery on your heart or blood vessels, such as a stent placement, a coronary artery bypass, or surgery on an artery in your head, neck, heart, or legs? No   3. Do you have chest pain with activity? No   4. Do you have a history of  heart failure? No   5. Do you currently have a cold, bronchitis or symptoms of other infection? No   6. Do you have a cough, shortness of breath, or wheezing? No   7. Do you or anyone in your family have previous history of blood clots? No   8. Do you or does anyone in your family have a serious bleeding problem such as prolonged bleeding following surgeries or cuts? No   9. Have you ever had problems with anemia or been told to take iron pills? No   10. Have you had any abnormal blood loss such as black, tarry or bloody stools, or abnormal vaginal bleeding? No   11. Have you ever had a blood transfusion? No   Are you willing to have a blood transfusion if it is medically needed before, during, or after your surgery? Yes    13. Have you or any of your relatives ever had problems with anesthesia? No   14. Do you have sleep apnea, excessive snoring or daytime drowsiness? No   15. Do you have any artifical heart valves or other implanted medical devices like a pacemaker, defibrillator, or continuous glucose monitor? No   16. Do you have artificial joints? No   17. Are you allergic to latex? No   18. Is there any chance that you may be pregnant? No     Patient does not have a Health Care Directive or Living Will: Discussed advance care planning with patient; however, patient declined at this time.    RX [place] ([place] reviewed:  reviewed- no concerns    ASTHMA - Patient has a longstanding history of moderate-severe Asthma . Patient has been doing well overall noting NO SYMPTOMS and continues on medication regimen consisting of Advair, Albuterol and Singulair without adverse reactions or side effects.     MILD SLEEP APNEA diagnosed with mild sleep apnea.  She is not using CPAP nor required to do so.  She has been referred for dental appliance but has declined to do so    HYPOTHYROIDISM -she denies any problems or concerns.  She is under the care of endocrinology.  She will be having upcoming labs.      Review of Systems  CONSTITUTIONAL: NEGATIVE for fever, chills, change in weight  INTEGUMENTARY/SKIN: NEGATIVE for worrisome rashes, moles or lesions  EYES: NEGATIVE for vision changes or irritation  ENT/MOUTH: NEGATIVE for ear, mouth and throat problems  RESP: NEGATIVE for significant cough or SOB  CV: NEGATIVE for chest pain, palpitations or peripheral edema  GI: NEGATIVE for nausea, abdominal pain, heartburn, or change in bowel habits   female: As above  MUSCULOSKELETAL: NEGATIVE for significant arthralgias or myalgia  NEURO: NEGATIVE for weakness, dizziness or paresthesias  ENDOCRINE: NEGATIVE for temperature intolerance, skin/hair changes  HEME: NEGATIVE for bleeding problems  PSYCHIATRIC: NEGATIVE for changes in mood  or affect    Patient Active Problem List    Diagnosis Date Noted     Metrorrhagia 08/31/2020     Priority: Medium     Added automatically from request for surgery [id number]       Cervical mass 08/31/2020     Priority: Medium     Added automatically from request for surgery [id number]       Morbid obesity (H) 08/28/2020     Priority: Medium     BRETT (obstructive sleep apnea)- mild (AHI 10) 11/06/2019     Priority: Medium     10/31/2019 [facility] Sleep Study (243.0 lbs) - AHI 10.6, RDI 52.9, Supine AHI 17.8, REM AHI 26.8, Low O2 81.1%, Time Spent ?88% 3.7 minutes / Time Spent ?89% 5.5 minutes.       Hx of papillary thyroid carcinoma 06/24/2019     Priority: Medium     Other hypoparathyroidism (H) 06/24/2019     Priority: Medium     Class 2 obesity due to excess calories without serious comorbidity with body mass index (BMI) of 39.0 to 39.9 in adult 06/24/2019     Priority: Medium     IUD (intrauterine device) in place 07/28/2017     Priority: Medium     Mirena placed 7/13/17       Postoperative hypothyroidism 11/13/2012     Priority: Medium     Postsurgical hypoparathyroidism (H) 11/13/2012     Priority: Medium     S/P complete thyroidectomy 09/25/2012     Priority: Medium     Plantar fasciitis 05/10/2012     Priority: Medium     Multiple respiratory allergies 12/13/2011     Priority: Medium     Mild persistent asthma 10/21/2011     Priority: Medium     CARDIOVASCULAR SCREENING; LDL GOAL LESS THAN 160 10/21/2011     Priority: Medium     Hirsutism 10/21/2011     Priority: Medium     Alpha-1-antitrypsin deficiency carrier 10/21/2011     Priority: Medium      Past Medical History:   Diagnosis Date     Gastroesophageal reflux disease      Hip strain 5/10/2012     Hypocalcemia 9/25/2012     Metrorrhagia 2/14/2014     PONV (postoperative nausea and vomiting)      Thyroid nodule 6/28/2012     Uncomplicated asthma      Past Surgical History:   Procedure Laterality Date     COLONOSCOPY WITH CO2 INSUFFLATION N/A 7/3/2019     Procedure: COLONOSCOPY, WITH CO2 INSUFFLATION;  Surgeon: Fabiana Ramos MD;  Location: [facility] OR     DILATION AND CURETTAGE, HYSTEROSCOPY DIAGNOSTIC, COMBINED N/A 07/13/2017     DILATION AND CURETTAGE, OPERATIVE HYSTEROSCOPY, COMBINED N/A 7/13/2017    Procedure: COMBINED DILATION AND CURETTAGE, OPERATIVE HYSTEROSCOPY;  Hysteroscopy, D&C, Mirena insertion;  Surgeon: Sierra Lane DO;  Location: [facility] OR     HERNIA REPAIR, INGUINAL RT/LT  infant     INSERT INTRAUTERINE DEVICE N/A 7/13/2017    Procedure: INSERT INTRAUTERINE DEVICE;;  Surgeon: Sierra Lane DO;  Location: [facility] OR     THYROIDECTOMY  8/8/2012    Procedure: THYROIDECTOMY;  Total Thyroidectomy and Central Neck Dissection;  Surgeon: Philomena Ruiz MD;  Location: [facility] OR     Current Outpatient Medications   Medication Sig Dispense Refill     albuterol (PROAIR HFA/PROVENTIL HFA/VENTOLIN HFA) 108 (90 Base) MCG/ACT inhaler Inhale 2 puffs into the lungs every 4 hours as needed for shortness of breath / dyspnea 1 Inhaler 6     calcitRIOL (ROCALTROL) 0.25 MCG capsule TAKE ONE CAPSULE BY MOUTH TWICE DAILY  180 capsule 0     calcium 600 MG tablet Take 2 tablets by mouth 2 times daily       Fluticasone Propionate (FLONASE NA) Spray 1 spray in nostril daily       fluticasone-salmeterol (ADVAIR) 250-50 MCG/DOSE inhaler Inhale 1 puff into the lungs 2 times daily 3 Inhaler 1     ibuprofen (ADVIL/MOTRIN) 200 MG tablet Take 200 mg by mouth every 4 hours as needed for mild pain       levonorgestrel (MIRENA (52 MG)) 20 MCG/24HR IUD 1 each by Intrauterine route once        levothyroxine (SYNTHROID/LEVOTHROID) 200 MCG tablet Take 1 tablet (200 mcg) by mouth daily Please keep 8-18-20 clinic appt for refills 30 tablet 1     loratadine (CLARITIN) 10 MG tablet Take 10 mg by mouth daily       montelukast (SINGULAIR) 10 MG tablet Take 1 tablet (10 mg) by mouth daily 90 tablet 3     omeprazole (PRILOSEC) 10 MG DR capsule Take 20 mg by mouth every other day        "Vitamin D, Cholecalciferol, 1000 units CAPS          No Known Allergies     Social History     Tobacco Use     Smoking status: Former Smoker     Last attempt to quit: 1996     Years since quittin.3     Smokeless tobacco: Never Used   Substance Use Topics     Alcohol use: Yes     Comment: 0-1 per month      Family History   Problem Relation Age of Onset     Asthma Father      Cancer Father         cirrhosis and liver cancer     Genetic Disorder Father         alpha 1 antitrypsin deficiency     Thyroid Disease Mother         also on blood thinner. unclear history     History   Drug Use No             Objective   /84   Pulse 82   Temp 97.6  F (36.4  C) (Temporal)   Resp 18   Ht 1.676 m (5' 6\")   Wt 112.6 kg (248 lb 3.2 oz)   LMP  (LMP Unknown)   SpO2 100%   Breastfeeding No   BMI 40.06 kg/m    Physical Exam    GENERAL APPEARANCE: healthy, alert and no distress     EYES: EOMI, PERRL     HENT: ear canals and TM's normal and nose and mouth without ulcers or lesions     NECK: no adenopathy, no asymmetry, masses, or scars and thyroid normal to palpation     RESP: lungs clear to auscultation - no rales, rhonchi or wheezes     CV: regular rates and rhythm, normal S1 S2, no S3 or S4 and no murmur, click or rub     ABDOMEN:  soft, nontender, no HSM or masses and bowel sounds normal     MS: extremities normal- no gross deformities noted, no evidence of inflammation in joints, FROM in all extremities.     SKIN: no suspicious lesions or rashes     NEURO: Normal strength and tone, sensory exam grossly normal, mentation intact and speech normal     PSYCH: mentation appears normal. and affect normal/bright     LYMPHATICS: No cervical adenopathy    Recent Labs   Lab Test 19  1106 19  1341   NA  --  139   POTASSIUM  --  3.9   CR 0.98 1.11*        PRE-OP Diagnostics:  No labs were ordered during this visit.  No EKG required for low risk surgery (cataract, skin procedure, breast biopsy, etc).       "   Assessment & Plan   The proposed surgical procedure is considered LOW risk.    REVISED CARDIAC RISK INDEX  The patient has the following serious cardiovascular risks for perioperative complications:  No serious cardiac risks = 0 points    INTERPRETATION: 0 points: Class I (very low risk - 0.4% complication rate)       1. Preop general physical exam  Patient is here for preoperative clearance today.  No concerns found today.  Patient is cleared for surgery.    2. Metrorrhagia  3. Cervical mass  Patient has had abnormal bleeding and had cervical mass noted on exam.  Proceeding with procedure with gynecology.    4. Mild persistent asthma without complication  Controlled at this time.  No concerns.  No change in plans today.    5. Morbid obesity (H)  Discussed appropriate diet and increased physical activity.  She is working out at least 2 days a week currently.  She has decreased soda intake.  She will recheck as needed for assistance.    6. BRETT (obstructive sleep apnea)- mild (AHI 10)  Does not require CPAP.  A dental appliance has been recommended.  Reports she does not feel she could tolerate that and so is not proceeding in that direction at this time.    7. Postoperative hypothyroidism  Denies any concerns with this today.  Followed by endocrinology.      The patient has the following additional risks and recommendations for perioperative complications:         MEDICATION INSTRUCTIONS:  Patient is to take all scheduled medications on the day of surgery    RECOMMENDATION:  APPROVAL GIVEN to proceed with proposed procedure, without further diagnostic evaluation.    No follow-ups on file.    Signed Electronically by: Fabiana Ramos MD    Copy of this evaluation report is provided to requesting physician.    [facility]    Revised Cardiac Risk Index

## 2020-09-30 NOTE — PROGRESS NOTES
[facility] [place]  [address], [address]  [place] [address]  Phone: [phone]  Fax: [phone]  Primary Provider: Fabiana Sepulveda  Pre-op Performing Provider: FABIANA SEPULVEDA    PREOPERATIVE EVALUATION:  Today's date: 9/30/2020    Kami Natarajan is a 50 year old female who presents for a preoperative evaluation.    Surgical Information:  Surgery Details 9/30/2020   Surgery/Procedure: uterus scraping-DNC, biopsy   Surgery Location: [facility]   Surgeon: Dr. Moran   Surgery Date: 10-8-20   Time of Surgery: 11:00   Where patient plans to recover: At home with family       Type of Anesthesia Anticipated: Local with MAC    Subjective     HPI related to upcoming procedure: patient had issues with vaginal bleeding. Had a lesion noted on her cervix that was bleeding. She has also had pelvic cramping and noted enlargement of a fibroid.       Preop Questions 9/30/2020   1. Have you ever had a heart attack or stroke? No   2. Have you ever had surgery on your heart or blood vessels, such as a stent placement, a coronary artery bypass, or surgery on an artery in your head, neck, heart, or legs? No   3. Do you have chest pain with activity? No   4. Do you have a history of  heart failure? No   5. Do you currently have a cold, bronchitis or symptoms of other infection? No   6. Do you have a cough, shortness of breath, or wheezing? No   7. Do you or anyone in your family have previous history of blood clots? No   8. Do you or does anyone in your family have a serious bleeding problem such as prolonged bleeding following surgeries or cuts? No   9. Have you ever had problems with anemia or been told to take iron pills? No   10. Have you had any abnormal blood loss such as black, tarry or bloody stools, or abnormal vaginal bleeding? No   11. Have you ever had a blood transfusion? No   Are you willing to have a blood transfusion if it is medically needed before, during, or after your surgery? Yes    13. Have you or any of your relatives ever had problems with anesthesia? No   14. Do you have sleep apnea, excessive snoring or daytime drowsiness? No   15. Do you have any artifical heart valves or other implanted medical devices like a pacemaker, defibrillator, or continuous glucose monitor? No   16. Do you have artificial joints? No   17. Are you allergic to latex? No   18. Is there any chance that you may be pregnant? No     Patient does not have a Health Care Directive or Living Will: Discussed advance care planning with patient; however, patient declined at this time.    RX monitoring program ([place]) reviewed:  reviewed- no concerns    ASTHMA - Patient has a longstanding history of moderate-severe Asthma . Patient has been doing well overall noting NO SYMPTOMS and continues on medication regimen consisting of Advair, Albuterol and Singulair without adverse reactions or side effects.     MILD SLEEP APNEA diagnosed with mild sleep apnea.  She is not using CPAP nor required to do so.  She has been referred for dental appliance but has declined to do so    HYPOTHYROIDISM -she denies any problems or concerns.  She is under the care of endocrinology.  She will be having upcoming labs.      Review of Systems  CONSTITUTIONAL: NEGATIVE for fever, chills, change in weight  INTEGUMENTARY/SKIN: NEGATIVE for worrisome rashes, moles or lesions  EYES: NEGATIVE for vision changes or irritation  ENT/MOUTH: NEGATIVE for ear, mouth and throat problems  RESP: NEGATIVE for significant cough or SOB  CV: NEGATIVE for chest pain, palpitations or peripheral edema  GI: NEGATIVE for nausea, abdominal pain, heartburn, or change in bowel habits   female: As above  MUSCULOSKELETAL: NEGATIVE for significant arthralgias or myalgia  NEURO: NEGATIVE for weakness, dizziness or paresthesias  ENDOCRINE: NEGATIVE for temperature intolerance, skin/hair changes  HEME: NEGATIVE for bleeding problems  PSYCHIATRIC: NEGATIVE for changes in mood  or affect    Patient Active Problem List    Diagnosis Date Noted     Metrorrhagia 08/31/2020     Priority: Medium     Added automatically from request for surgery [id number]       Cervical mass 08/31/2020     Priority: Medium     Added automatically from request for surgery [id number]       Morbid obesity (H) 08/28/2020     Priority: Medium     BRETT (obstructive sleep apnea)- mild (AHI 10) 11/06/2019     Priority: Medium     10/31/2019 [facility] Diagnostic Sleep Study (243.0 lbs) - AHI 10.6, RDI 52.9, Supine AHI 17.8, REM AHI 26.8, Low O2 81.1%, Time Spent ?88% 3.7 minutes / Time Spent ?89% 5.5 minutes.       Hx of papillary thyroid carcinoma 06/24/2019     Priority: Medium     Other hypoparathyroidism (H) 06/24/2019     Priority: Medium     Class 2 obesity due to excess calories without serious comorbidity with body mass index (BMI) of 39.0 to 39.9 in adult 06/24/2019     Priority: Medium     IUD (intrauterine device) in place 07/28/2017     Priority: Medium     Mirena placed 7/13/17       Postoperative hypothyroidism 11/13/2012     Priority: Medium     Postsurgical hypoparathyroidism (H) 11/13/2012     Priority: Medium     S/P complete thyroidectomy 09/25/2012     Priority: Medium     Plantar fasciitis 05/10/2012     Priority: Medium     Multiple respiratory allergies 12/13/2011     Priority: Medium     Mild persistent asthma 10/21/2011     Priority: Medium     CARDIOVASCULAR SCREENING; LDL GOAL LESS THAN 160 10/21/2011     Priority: Medium     Hirsutism 10/21/2011     Priority: Medium     Alpha-1-antitrypsin deficiency carrier 10/21/2011     Priority: Medium      Past Medical History:   Diagnosis Date     Gastroesophageal reflux disease      Hip strain 5/10/2012     Hypocalcemia 9/25/2012     Metrorrhagia 2/14/2014     PONV (postoperative nausea and vomiting)      Thyroid nodule 6/28/2012     Uncomplicated asthma      Past Surgical History:   Procedure Laterality Date     COLONOSCOPY WITH CO2 INSUFFLATION N/A 7/3/2019     Procedure: COLONOSCOPY, WITH CO2 INSUFFLATION;  Surgeon: Fabiana Ramos MD;  Location: [facility] OR     DILATION AND CURETTAGE, HYSTEROSCOPY DIAGNOSTIC, COMBINED N/A 07/13/2017     DILATION AND CURETTAGE, OPERATIVE HYSTEROSCOPY, COMBINED N/A 7/13/2017    Procedure: COMBINED DILATION AND CURETTAGE, OPERATIVE HYSTEROSCOPY;  Hysteroscopy, D&C, Mirena insertion;  Surgeon: Sierra Lane DO;  Location: [facility] OR     HERNIA REPAIR, INGUINAL RT/LT  infant     INSERT INTRAUTERINE DEVICE N/A 7/13/2017    Procedure: INSERT INTRAUTERINE DEVICE;;  Surgeon: Sierra Lane DO;  Location: [facility]     THYROIDECTOMY  8/8/2012    Procedure: THYROIDECTOMY;  Total Thyroidectomy and Central Neck Dissection;  Surgeon: Philomena Ruiz MD;  Location: [facility] OR     Current Outpatient Medications   Medication Sig Dispense Refill     albuterol (PROAIR HFA/PROVENTIL HFA/VENTOLIN HFA) 108 (90 Base) MCG/ACT inhaler Inhale 2 puffs into the lungs every 4 hours as needed for shortness of breath / dyspnea 1 Inhaler 6     calcitRIOL (ROCALTROL) 0.25 MCG capsule TAKE ONE CAPSULE BY MOUTH TWICE DAILY  180 capsule 0     calcium 600 MG tablet Take 2 tablets by mouth 2 times daily       Fluticasone Propionate (FLONASE NA) Spray 1 spray in nostril daily       fluticasone-salmeterol (ADVAIR) 250-50 MCG/DOSE inhaler Inhale 1 puff into the lungs 2 times daily 3 Inhaler 1     ibuprofen (ADVIL/MOTRIN) 200 MG tablet Take 200 mg by mouth every 4 hours as needed for mild pain       levonorgestrel (MIRENA (52 MG)) 20 MCG/24HR IUD 1 each by Intrauterine route once        levothyroxine (SYNTHROID/LEVOTHROID) 200 MCG tablet Take 1 tablet (200 mcg) by mouth daily Please keep 8-18-20 clinic appt for refills 30 tablet 1     loratadine (CLARITIN) 10 MG tablet Take 10 mg by mouth daily       montelukast (SINGULAIR) 10 MG tablet Take 1 tablet (10 mg) by mouth daily 90 tablet 3     omeprazole (PRILOSEC) 10 MG DR capsule Take 20 mg by mouth every other day        "Vitamin D, Cholecalciferol, 1000 units CAPS          No Known Allergies     Social History     Tobacco Use     Smoking status: Former Smoker     Last attempt to quit: 1996     Years since quittin.3     Smokeless tobacco: Never Used   Substance Use Topics     Alcohol use: Yes     Comment: 0-1 per month      Family History   Problem Relation Age of Onset     Asthma Father      Cancer Father         cirrhosis and liver cancer     Genetic Disorder Father         alpha 1 antitrypsin deficiency     Thyroid Disease Mother         also on blood thinner. unclear history     History   Drug Use No             Objective   /84   Pulse 82   Temp 97.6  F (36.4  C) (Temporal)   Resp 18   Ht 1.676 m (5' 6\")   Wt 112.6 kg (248 lb 3.2 oz)   LMP  (LMP Unknown)   SpO2 100%   Breastfeeding No   BMI 40.06 kg/m    Physical Exam    GENERAL APPEARANCE: healthy, alert and no distress     EYES: EOMI, PERRL     HENT: ear canals and TM's normal and nose and mouth without ulcers or lesions     NECK: no adenopathy, no asymmetry, masses, or scars and thyroid normal to palpation     RESP: lungs clear to auscultation - no rales, rhonchi or wheezes     CV: regular rates and rhythm, normal S1 S2, no S3 or S4 and no murmur, click or rub     ABDOMEN:  soft, nontender, no HSM or masses and bowel sounds normal     MS: extremities normal- no gross deformities noted, no evidence of inflammation in joints, FROM in all extremities.     SKIN: no suspicious lesions or rashes     NEURO: Normal strength and tone, sensory exam grossly normal, mentation intact and speech normal     PSYCH: mentation appears normal. and affect normal/bright     LYMPHATICS: No cervical adenopathy    Recent Labs   Lab Test 19  1106 19  1341   NA  --  139   POTASSIUM  --  3.9   CR 0.98 1.11*        PRE-OP Diagnostics:  No labs were ordered during this visit.  No EKG required for low risk surgery (cataract, skin procedure, breast biopsy, etc).       "   Assessment & Plan   The proposed surgical procedure is considered LOW risk.    REVISED CARDIAC RISK INDEX  The patient has the following serious cardiovascular risks for perioperative complications:  No serious cardiac risks = 0 points    INTERPRETATION: 0 points: Class I (very low risk - 0.4% complication rate)       1. Preop general physical exam  Patient is here for preoperative clearance today.  No concerns found today.  Patient is cleared for surgery.    2. Metrorrhagia  3. Cervical mass  Patient has had abnormal bleeding and had cervical mass noted on exam.  Proceeding with procedure with gynecology.    4. Mild persistent asthma without complication  Controlled at this time.  No concerns.  No change in plans today.    5. Morbid obesity (H)  Discussed appropriate diet and increased physical activity.  She is working out at least 2 days a week currently.  She has decreased soda intake.  She will recheck as needed for assistance.    6. BRETT (obstructive sleep apnea)- mild (AHI 10)  Does not require CPAP.  A dental appliance has been recommended.  Reports she does not feel she could tolerate that and so is not proceeding in that direction at this time.    7. Postoperative hypothyroidism  Denies any concerns with this today.  Followed by endocrinology.      The patient has the following additional risks and recommendations for perioperative complications:         MEDICATION INSTRUCTIONS:  Patient is to take all scheduled medications on the day of surgery    RECOMMENDATION:  APPROVAL GIVEN to proceed with proposed procedure, without further diagnostic evaluation.    No follow-ups on file.    Signed Electronically by: Fabiana Ramos MD    Copy of this evaluation report is provided to requesting physician.    [facility]    Revised Cardiac Risk Index

## 2020-10-05 ENCOUNTER — MYC MEDICAL ADVICE (OUTPATIENT)
Dept: FAMILY MEDICINE | Facility: CLINIC | Age: 50
End: 2020-10-05

## 2020-10-05 DIAGNOSIS — Z01.812 PRE-PROCEDURE LAB EXAM: ICD-10-CM

## 2020-10-05 LAB
SARS-COV-2 RNA SPEC QL NAA+PROBE: NOT DETECTED
SPECIMEN SOURCE: NORMAL

## 2020-10-05 PROCEDURE — U0003 INFECTIOUS AGENT DETECTION BY NUCLEIC ACID (DNA OR RNA); SEVERE ACUTE RESPIRATORY SYNDROME CORONAVIRUS 2 (SARS-COV-2) (CORONAVIRUS DISEASE [COVID-19]), AMPLIFIED PROBE TECHNIQUE, MAKING USE OF HIGH THROUGHPUT TECHNOLOGIES AS DESCRIBED BY CMS-2020-01-R: HCPCS | Performed by: OBSTETRICS & GYNECOLOGY

## 2020-10-05 NOTE — TELEPHONE ENCOUNTER
This was in notes; please confirm and I can reply if needed    OV 9/30  MEDICATION INSTRUCTIONS:  Patient is to take all scheduled medications on the day of surgery

## 2020-10-07 ENCOUNTER — ANESTHESIA EVENT (OUTPATIENT)
Dept: SURGERY | Facility: AMBULATORY SURGERY CENTER | Age: 50
End: 2020-10-07

## 2020-10-08 ENCOUNTER — HOSPITAL ENCOUNTER (OUTPATIENT)
Facility: AMBULATORY SURGERY CENTER | Age: 50
Discharge: HOME | End: 2020-10-08
Attending: OBSTETRICS & GYNECOLOGY | Admitting: OBSTETRICS & GYNECOLOGY
Payer: COMMERCIAL

## 2020-10-08 ENCOUNTER — ANESTHESIA (OUTPATIENT)
Dept: SURGERY | Facility: AMBULATORY SURGERY CENTER | Age: 50
End: 2020-10-08

## 2020-10-08 VITALS
HEART RATE: 71 BPM | OXYGEN SATURATION: 98 % | TEMPERATURE: 97.5 F | DIASTOLIC BLOOD PRESSURE: 60 MMHG | SYSTOLIC BLOOD PRESSURE: 111 MMHG | RESPIRATION RATE: 18 BRPM

## 2020-10-08 DIAGNOSIS — N92.1 METRORRHAGIA: ICD-10-CM

## 2020-10-08 DIAGNOSIS — N88.8 CERVICAL MASS: ICD-10-CM

## 2020-10-08 LAB — B-HCG SERPL-ACNC: <1 IU/L (ref 0–5)

## 2020-10-08 PROCEDURE — 58558 HYSTEROSCOPY BIOPSY: CPT

## 2020-10-08 PROCEDURE — 87624 HPV HI-RISK TYP POOLED RSLT: CPT | Performed by: OBSTETRICS & GYNECOLOGY

## 2020-10-08 PROCEDURE — G8907 PT DOC NO EVENTS ON DISCHARG: HCPCS

## 2020-10-08 PROCEDURE — 88305 TISSUE EXAM BY PATHOLOGIST: CPT | Performed by: PATHOLOGY

## 2020-10-08 PROCEDURE — 58558 HYSTEROSCOPY BIOPSY: CPT | Performed by: OBSTETRICS & GYNECOLOGY

## 2020-10-08 PROCEDURE — 84702 CHORIONIC GONADOTROPIN TEST: CPT | Performed by: OBSTETRICS & GYNECOLOGY

## 2020-10-08 PROCEDURE — G8918 PT W/O PREOP ORDER IV AB PRO: HCPCS

## 2020-10-08 RX ORDER — FENTANYL CITRATE 50 UG/ML
25-50 INJECTION, SOLUTION INTRAMUSCULAR; INTRAVENOUS
Status: DISCONTINUED | OUTPATIENT
Start: 2020-10-08 | End: 2020-10-09 | Stop reason: HOSPADM

## 2020-10-08 RX ORDER — NALOXONE HYDROCHLORIDE 0.4 MG/ML
.1-.4 INJECTION, SOLUTION INTRAMUSCULAR; INTRAVENOUS; SUBCUTANEOUS
Status: DISCONTINUED | OUTPATIENT
Start: 2020-10-08 | End: 2020-10-09 | Stop reason: HOSPADM

## 2020-10-08 RX ORDER — PROPOFOL 10 MG/ML
INJECTION, EMULSION INTRAVENOUS CONTINUOUS PRN
Status: DISCONTINUED | OUTPATIENT
Start: 2020-10-08 | End: 2020-10-08

## 2020-10-08 RX ORDER — ONDANSETRON 4 MG/1
4 TABLET, ORALLY DISINTEGRATING ORAL EVERY 30 MIN PRN
Status: DISCONTINUED | OUTPATIENT
Start: 2020-10-08 | End: 2020-10-09 | Stop reason: HOSPADM

## 2020-10-08 RX ORDER — ONDANSETRON 2 MG/ML
4 INJECTION INTRAMUSCULAR; INTRAVENOUS EVERY 30 MIN PRN
Status: DISCONTINUED | OUTPATIENT
Start: 2020-10-08 | End: 2020-10-09 | Stop reason: HOSPADM

## 2020-10-08 RX ORDER — LIDOCAINE 40 MG/G
CREAM TOPICAL
Status: DISCONTINUED | OUTPATIENT
Start: 2020-10-08 | End: 2020-10-09 | Stop reason: HOSPADM

## 2020-10-08 RX ORDER — OXYCODONE HYDROCHLORIDE 5 MG/1
5 TABLET ORAL EVERY 4 HOURS PRN
Status: DISCONTINUED | OUTPATIENT
Start: 2020-10-08 | End: 2020-10-09 | Stop reason: HOSPADM

## 2020-10-08 RX ORDER — ACETAMINOPHEN 325 MG/1
975 TABLET ORAL ONCE
Status: DISCONTINUED | OUTPATIENT
Start: 2020-10-08 | End: 2020-10-09 | Stop reason: HOSPADM

## 2020-10-08 RX ORDER — IBUPROFEN 600 MG/1
600 TABLET, FILM COATED ORAL
Status: CANCELLED | OUTPATIENT
Start: 2020-10-08

## 2020-10-08 RX ORDER — GABAPENTIN 300 MG/1
300 CAPSULE ORAL ONCE
Status: COMPLETED | OUTPATIENT
Start: 2020-10-08 | End: 2020-10-08

## 2020-10-08 RX ORDER — PROPOFOL 10 MG/ML
INJECTION, EMULSION INTRAVENOUS PRN
Status: DISCONTINUED | OUTPATIENT
Start: 2020-10-08 | End: 2020-10-08

## 2020-10-08 RX ORDER — HYDROCODONE BITARTRATE AND ACETAMINOPHEN 5; 325 MG/1; MG/1
1-2 TABLET ORAL EVERY 4 HOURS PRN
Qty: 3 TABLET | Refills: 0 | Status: SHIPPED | OUTPATIENT
Start: 2020-10-08 | End: 2021-06-16

## 2020-10-08 RX ORDER — FENTANYL CITRATE 50 UG/ML
INJECTION, SOLUTION INTRAMUSCULAR; INTRAVENOUS PRN
Status: DISCONTINUED | OUTPATIENT
Start: 2020-10-08 | End: 2020-10-08

## 2020-10-08 RX ORDER — HYDROMORPHONE HYDROCHLORIDE 1 MG/ML
.3-.5 INJECTION, SOLUTION INTRAMUSCULAR; INTRAVENOUS; SUBCUTANEOUS EVERY 10 MIN PRN
Status: DISCONTINUED | OUTPATIENT
Start: 2020-10-08 | End: 2020-10-09 | Stop reason: HOSPADM

## 2020-10-08 RX ORDER — ONDANSETRON 2 MG/ML
INJECTION INTRAMUSCULAR; INTRAVENOUS PRN
Status: DISCONTINUED | OUTPATIENT
Start: 2020-10-08 | End: 2020-10-08

## 2020-10-08 RX ORDER — MEPERIDINE HYDROCHLORIDE 25 MG/ML
12.5 INJECTION INTRAMUSCULAR; INTRAVENOUS; SUBCUTANEOUS
Status: DISCONTINUED | OUTPATIENT
Start: 2020-10-08 | End: 2020-10-09 | Stop reason: HOSPADM

## 2020-10-08 RX ORDER — SODIUM CHLORIDE, SODIUM LACTATE, POTASSIUM CHLORIDE, CALCIUM CHLORIDE 600; 310; 30; 20 MG/100ML; MG/100ML; MG/100ML; MG/100ML
INJECTION, SOLUTION INTRAVENOUS CONTINUOUS
Status: DISCONTINUED | OUTPATIENT
Start: 2020-10-08 | End: 2020-10-09 | Stop reason: HOSPADM

## 2020-10-08 RX ORDER — ACETAMINOPHEN 325 MG/1
975 TABLET ORAL ONCE
Status: COMPLETED | OUTPATIENT
Start: 2020-10-08 | End: 2020-10-08

## 2020-10-08 RX ORDER — HYDROCODONE BITARTRATE AND ACETAMINOPHEN 5; 325 MG/1; MG/1
1 TABLET ORAL
Status: CANCELLED | OUTPATIENT
Start: 2020-10-08

## 2020-10-08 RX ORDER — KETOROLAC TROMETHAMINE 30 MG/ML
INJECTION, SOLUTION INTRAMUSCULAR; INTRAVENOUS PRN
Status: DISCONTINUED | OUTPATIENT
Start: 2020-10-08 | End: 2020-10-08

## 2020-10-08 RX ORDER — LIDOCAINE HYDROCHLORIDE 20 MG/ML
INJECTION, SOLUTION INFILTRATION; PERINEURAL PRN
Status: DISCONTINUED | OUTPATIENT
Start: 2020-10-08 | End: 2020-10-08

## 2020-10-08 RX ADMIN — PROPOFOL 50 MG: 10 INJECTION, EMULSION INTRAVENOUS at 11:40

## 2020-10-08 RX ADMIN — PROPOFOL 150 MCG/KG/MIN: 10 INJECTION, EMULSION INTRAVENOUS at 11:39

## 2020-10-08 RX ADMIN — PROPOFOL 30 MG: 10 INJECTION, EMULSION INTRAVENOUS at 11:41

## 2020-10-08 RX ADMIN — KETOROLAC TROMETHAMINE 30 MG: 30 INJECTION, SOLUTION INTRAMUSCULAR; INTRAVENOUS at 12:10

## 2020-10-08 RX ADMIN — LIDOCAINE HYDROCHLORIDE 60 MG: 20 INJECTION, SOLUTION INFILTRATION; PERINEURAL at 11:40

## 2020-10-08 RX ADMIN — GABAPENTIN 300 MG: 300 CAPSULE ORAL at 09:59

## 2020-10-08 RX ADMIN — ACETAMINOPHEN 975 MG: 325 TABLET ORAL at 09:58

## 2020-10-08 RX ADMIN — ONDANSETRON 4 MG: 2 INJECTION INTRAMUSCULAR; INTRAVENOUS at 11:45

## 2020-10-08 RX ADMIN — FENTANYL CITRATE 50 MCG: 50 INJECTION, SOLUTION INTRAMUSCULAR; INTRAVENOUS at 11:40

## 2020-10-08 RX ADMIN — SODIUM CHLORIDE, SODIUM LACTATE, POTASSIUM CHLORIDE, CALCIUM CHLORIDE: 600; 310; 30; 20 INJECTION, SOLUTION INTRAVENOUS at 10:12

## 2020-10-08 NOTE — OP NOTE
OPERATIVE REPORT:    Date of Procedure:  10/8/2020    Preoperative Diagnosis:  Metrorrhagia, cervical mass    Postoperative Diagnosis:  Metrorrhagia, cervical ectropion    Procedures:  Pap smear, hysteroscopy, D&C    Surgeon:  Sierra Lane    Assist:  OR staff    Anesthesia:  MAC    Specimens:    Endometrial curettings, pap smear    Complications:   None    Findings/Conclusions:   Proliferative endometrium, IUD present, cervical ectropion-no mass seen    Estimated Blood Loss:  Minimal    Condition on discharge from OR:  Satisfactory      Procedure in Detail:  Proper consents were obtained.  The patient and I reviewed the risks, benefits, goals and limitations.  Her questions seemed to be answered.  After consenting to the procedure, the patient was taken to the OR where she was placed into the supine position.  She was then placed under MAC anesthesia after which she was placed into the dorsal lithotomy position.  A pap smear was performed.  She was then prepped and draped into the usual sterile fashion.  I then performed the exam under anesthesia.  The speculum was placed and the cervix was grasped with a single tooth tenaculum. IUD strings were seen coming from os. The hysteroscope was inserted in through the cervix with the findings as noted above.  The hysteroscope was removed.  Sharp curettage of the uterus was performed in a circumferential manor until a gritty texture was noted.  The curettage started at the 12 o'clock position and each pass was brought out, from each of the hours of the clock.  The cornua and the fundus were curetted.  Endometrial pipelle was passed through as well.   The endometrial curettings were sent to pathology. The single tooth tenaculum was removed and hemostasis was noted.  Everything was removed from the patient's vagina.  The patient was then taken out of the dorsal lithotomy position, awakened, and taken to the recovery room in stable condition.    No apparent  complications.  Counts were correct.     Sierra Lane

## 2020-10-08 NOTE — ANESTHESIA POSTPROCEDURE EVALUATION
Anesthesia POST Procedure Evaluation    Patient: Kami Natarajan   MRN:     [id number] Gender:   female   Age:    50 year old :      1970        Preoperative Diagnosis: Metrorrhagia [N92.1]  Cervical mass [N88.8]   Procedure(s):  Hysteroscopy dilation and curettage,pap smear   Postop Comments: No value filed.     Anesthesia Type: No value filed.          Postop Pain Control: Uneventful            Sign Out: Well controlled pain   PONV: No   Neuro/Psych: Uneventful            Sign Out: Acceptable/Baseline neuro status   Airway/Respiratory: Uneventful            Sign Out: Acceptable/Baseline resp. status   CV/Hemodynamics: Uneventful            Sign Out: Acceptable CV status   Other NRE: NONE   DID A NON-ROUTINE EVENT OCCUR? No         Last Anesthesia Record Vitals:  CRNA VITALS  10/8/2020 1146 - 10/8/2020 1246      10/8/2020             Pulse:  73    Temp:  35.6  C (96.1  F)    SpO2:  95 %          Last PACU Vitals:  Vitals Value Taken Time   /59 10/08/20 1220   Temp 36.4  C (97.5  F) 10/08/20 1220   Pulse 79 10/08/20 1220   Resp 16 10/08/20 1220   SpO2 98 % 10/08/20 1220   Temp src     NIBP     Pulse     SpO2     Resp     Temp     Ht Rate     Temp 2           Electronically Signed By: Brennan King MD, 2020, 3:33 PM

## 2020-10-08 NOTE — DISCHARGE INSTRUCTIONS
[facility]   Adult Discharge Orders & Instructions     For 24 hours after surgery    1. Get plenty of rest.  A responsible adult must stay with you for at least 24 hours after you leave the hospital.   2. Do not drive or use heavy equipment.  If you have weakness or tingling, don't drive or use heavy equipment until this feeling goes away.  3. Do not drink alcohol.  4. Avoid strenuous or risky activities.  Ask for help when climbing stairs.   5. You may feel lightheaded.  IF so, sit for a few minutes before standing.  Have someone help you get up.   6. If you have nausea (feel sick to your stomach): Drink only clear liquids such as apple juice, ginger ale, broth or 7-Up.  Rest may also help.  Be sure to drink enough fluids.  Move to a regular diet as you feel able.  7. You may have a slight fever. Call the doctor if your fever is over 100 F (37.7 C) (taken under the tongue) or lasts longer than 24 hours.  8. You may have a dry mouth, a sore throat, muscle aches or trouble sleeping.  These should go away after 24 hours.  9. Do not make important or legal decisions.     Call your doctor for any of the followin.  Signs of infection (fever, growing tenderness at the surgery site, a large amount of drainage or bleeding, severe pain, foul-smelling drainage, redness, swelling).    2. It has been over 8 to 10 hours since surgery and you are still not able to urinate (pass water).    3.  Headache for over 24 hours.    4.  Numbness, tingling or weakness the day after surgery (if you had spinal anesthesia).                  5. Signs of Covid-19 infection (temperature over 100 degrees, shortness of breath, cough, loss of taste/smell, generalized body aches, persistent headache,                  chills, sore throat, nausea/vomiting/diarrhea).    Had Tylenol at 10:00 am today.  Had ibuprofen (in IV) at 12:00 noon today.

## 2020-10-08 NOTE — ANESTHESIA CARE TRANSFER NOTE
Patient: Kami Natarajan    Procedure(s):  Hysteroscopy dilation and curettage,pap smear    Diagnosis: Metrorrhagia [N92.1]  Cervical mass [N88.8]  Diagnosis Additional Information: No value filed.    Anesthesia Type:   No value filed.     Note:  Airway :Face Mask  Patient transferred to:Phase II  Handoff Report: Identifed the Patient, Identified the Reponsible Provider, Reviewed the pertinent medical history, Discussed the surgical course, Reviewed Intra-OP anesthesia mangement and issues during anesthesia, Set expectations for post-procedure period and Allowed opportunity for questions and acknowledgement of understanding      Vitals: (Last set prior to Anesthesia Care Transfer)    CRNA VITALS  10/8/2020 1146 - 10/8/2020 1220      10/8/2020             Pulse:  73    Temp:  96.1  F (35.6  C)    SpO2:  95 %                Electronically Signed By: FROY Lees CRNA  October 8, 2020  12:20 PM

## 2020-10-08 NOTE — ANESTHESIA PREPROCEDURE EVALUATION
"Anesthesia Pre-Procedure Evaluation    Patient: Kami Natarajan   MRN:     [id number] Gender:   female   Age:    50 year old :      1970        Preoperative Diagnosis: Metrorrhagia [N92.1]  Cervical mass [N88.8]   Procedure(s):  Hysteroscopy dilation and curettage,pap smear     LABS:  CBC:   Lab Results   Component Value Date    WBC 8.1 07/10/2017    WBC 8.2 2017    HGB 11.9 07/10/2017    HGB 12.6 2017    HCT 35.5 07/10/2017    HCT 36.5 2017     07/10/2017     2017     BMP:   Lab Results   Component Value Date     2019     07/10/2017    POTASSIUM 3.9 2019    POTASSIUM 3.7 07/10/2017    CHLORIDE 103 2019    CHLORIDE 106 07/10/2017    CO2 32 2019    CO2 26 07/10/2017    BUN 10 2019    BUN 8 2019    CR 0.98 2019    CR 1.11 (H) 2019    GLC 89 2019    GLC 71 07/10/2017     COAGS: No results found for: PTT, INR, FIBR  POC:   Lab Results   Component Value Date    HCGS Negative 2017     OTHER:   Lab Results   Component Value Date    A1C 5.3 2014    GRAEME 8.1 (L) 2019    PHOS 3.7 2019    ALBUMIN 3.4 2019    PROTTOTAL 7.6 2019    ALT 26 2019    AST 19 2019    ALKPHOS 93 2019    BILITOTAL 0.3 2019    TSH 0.30 (L) 2019    T4 1.48 (H) 2019        Preop Vitals    BP Readings from Last 3 Encounters:   10/08/20 111/60   20 110/84   20 120/82    Pulse Readings from Last 3 Encounters:   10/08/20 71   20 82   20 76      Resp Readings from Last 3 Encounters:   10/08/20 18   20 18   19 16    SpO2 Readings from Last 3 Encounters:   10/08/20 98%   20 100%   20 100%      Temp Readings from Last 1 Encounters:   10/08/20 36.4  C (97.5  F) (Temporal)    Ht Readings from Last 1 Encounters:   20 1.676 m (5' 6\")      Wt Readings from Last 1 Encounters:   20 112.6 kg (248 lb 3.2 oz)    Estimated body mass index " "is 40.06 kg/m  as calculated from the following:    Height as of 9/30/20: 1.676 m (5' 6\").    Weight as of 9/30/20: 112.6 kg (248 lb 3.2 oz).     LDA:        Past Medical History:   Diagnosis Date     Gastroesophageal reflux disease      Hip strain 5/10/2012     Hypocalcemia 9/25/2012     Metrorrhagia 2/14/2014     PONV (postoperative nausea and vomiting)      Thyroid nodule 6/28/2012     Uncomplicated asthma       Past Surgical History:   Procedure Laterality Date     COLONOSCOPY WITH CO2 INSUFFLATION N/A 7/3/2019    Procedure: COLONOSCOPY, WITH CO2 INSUFFLATION;  Surgeon: Fabiana Ramos MD;  Location: [facility] OR     DILATION AND CURETTAGE, HYSTEROSCOPY DIAGNOSTIC, COMBINED N/A 07/13/2017     DILATION AND CURETTAGE, OPERATIVE HYSTEROSCOPY, COMBINED N/A 7/13/2017    Procedure: COMBINED DILATION AND CURETTAGE, OPERATIVE HYSTEROSCOPY;  Hysteroscopy, D&C, Mirena insertion;  Surgeon: Sierra Lane DO;  Location: [facility] OR     HERNIA REPAIR, INGUINAL RT/LT  infant     INSERT INTRAUTERINE DEVICE N/A 7/13/2017    Procedure: INSERT INTRAUTERINE DEVICE;;  Surgeon: Sierra Lane DO;  Location: [facility] OR     THYROIDECTOMY  8/8/2012    Procedure: THYROIDECTOMY;  Total Thyroidectomy and Central Neck Dissection;  Surgeon: Philomena Ruiz MD;  Location: [facility] OR      No Known Allergies     Anesthesia Evaluation     . Pt has had prior anesthetic.     History of anesthetic complications   - PONV        ROS/MED HX    ENT/Pulmonary:     (+)sleep apnea, asthma , . .    Neurologic:       Cardiovascular:         METS/Exercise Tolerance:     Hematologic:         Musculoskeletal:         GI/Hepatic:     (+) GERD       Renal/Genitourinary:         Endo:         Psychiatric:         Infectious Disease:         Malignancy:         Other:                         PHYSICAL EXAM:   Mental Status/Neuro: A/A/O   Airway: Facies: Feasible  Mallampati: I  Mouth/Opening: Full  TM distance: > 6 cm  Neck ROM: Full   Respiratory: Auscultation: " CTAB     Resp. Rate: Normal     Resp. Effort: Normal      CV: Rhythm: Regular  Rate: Age appropriate  Heart: Normal Sounds  Edema: None   Comments:      Dental: Normal Dentition                Assessment:   ASA SCORE: 2    H&P: History and physical reviewed and following examination; no interval change.   Smoking Status:  Non-Smoker/Unknown   NPO Status: NPO Appropriate     Plan:   Anes. Type:  MAC   Pre-Medication: None   Induction:  N/a   Airway: Native Airway   Access/Monitoring: PIV   Maintenance: N/a     Postop Plan:   Postop Pain: None  Postop Sedation/Airway: Not planned  Disposition: Outpatient     PONV Management:   Adult Risk Factors: Female, H/o PONV or Motion Sickness, Non-Smoker   Prevention: Ondansetron, Propofol     CONSENT: Direct conversation   Plan and risks discussed with: Patient   Blood Products: Consent Deferred (Minimal Blood Loss)                   Brennan King MD

## 2020-10-08 NOTE — INTERVAL H&P NOTE
I discussed risks, benefits, and complications of surgery including but not limited to bleeding, infection, damage to nearby organs including but not limited to bladder, bowel, ureters, nerves, and blood vessels as well as anesthesia risks.  Injury may result at the time of surgery or in a separate procedure.  We also discussed the possibility of a reoperation if the pathology came back abnormal.  All questions answered, and accepting these risks, the patient elects to proceed with the procedure.        Sierra Lane,   HcG=Negative

## 2020-10-12 LAB — COPATH REPORT: NORMAL

## 2020-10-13 LAB
COPATH REPORT: NORMAL
PAP: NORMAL

## 2020-10-15 DIAGNOSIS — Z85.850 HX OF PAPILLARY THYROID CARCINOMA: ICD-10-CM

## 2020-10-15 DIAGNOSIS — E20.89 OTHER HYPOPARATHYROIDISM (H): ICD-10-CM

## 2020-10-15 DIAGNOSIS — E83.51 HYPOCALCEMIA: ICD-10-CM

## 2020-10-15 DIAGNOSIS — E89.0 POSTOPERATIVE HYPOTHYROIDISM: ICD-10-CM

## 2020-10-15 LAB
ALBUMIN SERPL-MCNC: 3.8 G/DL (ref 3.4–5)
ANION GAP SERPL CALCULATED.3IONS-SCNC: 6 MMOL/L (ref 3–14)
BUN SERPL-MCNC: 13 MG/DL (ref 7–30)
CALCIUM SERPL-MCNC: 8.8 MG/DL (ref 8.5–10.1)
CHLORIDE SERPL-SCNC: 107 MMOL/L (ref 94–109)
CO2 SERPL-SCNC: 25 MMOL/L (ref 20–32)
CREAT SERPL-MCNC: 0.92 MG/DL (ref 0.52–1.04)
FINAL DIAGNOSIS: NORMAL
GFR SERPL CREATININE-BSD FRML MDRD: 72 ML/MIN/{1.73_M2}
GLUCOSE SERPL-MCNC: 91 MG/DL (ref 70–99)
HPV HR 12 DNA CVX QL NAA+PROBE: NEGATIVE
HPV16 DNA SPEC QL NAA+PROBE: NEGATIVE
HPV18 DNA SPEC QL NAA+PROBE: NEGATIVE
POTASSIUM SERPL-SCNC: 4 MMOL/L (ref 3.4–5.3)
SODIUM SERPL-SCNC: 138 MMOL/L (ref 133–144)
SPECIMEN DESCRIPTION: NORMAL
SPECIMEN SOURCE CVX/VAG CYTO: NORMAL
TSH SERPL DL<=0.005 MIU/L-ACNC: 0.44 MU/L (ref 0.4–4)

## 2020-10-15 PROCEDURE — 84443 ASSAY THYROID STIM HORMONE: CPT | Performed by: INTERNAL MEDICINE

## 2020-10-15 PROCEDURE — 82306 VITAMIN D 25 HYDROXY: CPT | Performed by: INTERNAL MEDICINE

## 2020-10-15 PROCEDURE — 84432 ASSAY OF THYROGLOBULIN: CPT | Mod: 90 | Performed by: INTERNAL MEDICINE

## 2020-10-15 PROCEDURE — 36415 COLL VENOUS BLD VENIPUNCTURE: CPT | Performed by: INTERNAL MEDICINE

## 2020-10-15 PROCEDURE — 86800 THYROGLOBULIN ANTIBODY: CPT | Mod: 90 | Performed by: INTERNAL MEDICINE

## 2020-10-15 PROCEDURE — 99N1030 PR STATISTIC REMEASURE THYROGLOBULIN: Mod: 90 | Performed by: INTERNAL MEDICINE

## 2020-10-15 PROCEDURE — 80048 BASIC METABOLIC PNL TOTAL CA: CPT | Performed by: INTERNAL MEDICINE

## 2020-10-15 PROCEDURE — 99000 SPECIMEN HANDLING OFFICE-LAB: CPT | Performed by: INTERNAL MEDICINE

## 2020-10-15 PROCEDURE — 82040 ASSAY OF SERUM ALBUMIN: CPT | Performed by: INTERNAL MEDICINE

## 2020-10-20 LAB
DEPRECATED CALCIDIOL+CALCIFEROL SERPL-MC: <40 UG/L (ref 20–75)
VITAMIN D2 SERPL-MCNC: <5 UG/L
VITAMIN D3 SERPL-MCNC: 35 UG/L

## 2020-10-21 NOTE — RESULT ENCOUNTER NOTE
Kami,    # Vitamin D level is within the normal limits. Continue your current supplement.   # thyroid blood work result is within the desired range. Please continue your current therapy without any change.   # Your calcium and other related lab results are within the normal limits. Please continue your current calcium and calcitriol without any change.   Please let me know if any questions regarding these results.     Owen Handley MD

## 2020-10-23 ENCOUNTER — OFFICE VISIT (OUTPATIENT)
Dept: OBGYN | Facility: OTHER | Age: 50
End: 2020-10-23
Payer: COMMERCIAL

## 2020-10-23 VITALS — WEIGHT: 249 LBS | SYSTOLIC BLOOD PRESSURE: 126 MMHG | BODY MASS INDEX: 40.19 KG/M2 | DIASTOLIC BLOOD PRESSURE: 82 MMHG

## 2020-10-23 DIAGNOSIS — N92.1 METRORRHAGIA: Primary | ICD-10-CM

## 2020-10-23 PROCEDURE — 99213 OFFICE O/P EST LOW 20 MIN: CPT | Performed by: OBSTETRICS & GYNECOLOGY

## 2020-10-23 NOTE — PROGRESS NOTES
Subjective  50 year old non-pregnant female presents today as a post-op from a HD&C, pap smear on 10/8/2020 for metrorrhagia.  Patient states she is doing well.  No pain.  Minimal vaginal spotting.  No problems urinating.  Normal bowel movements.  No fever or chills.  Patient has not had intercourse.  We reviewed her pictures and pathology from surgery and all questions were answered.      ROS: 10 point ROS neg other than the symptoms noted above in the HPI.  Past Medical History:   Diagnosis Date     Gastroesophageal reflux disease      Hip strain 5/10/2012     Hypocalcemia 9/25/2012     Metrorrhagia 2/14/2014     PONV (postoperative nausea and vomiting)      Thyroid nodule 6/28/2012     Uncomplicated asthma      Past Surgical History:   Procedure Laterality Date     COLONOSCOPY WITH CO2 INSUFFLATION N/A 7/3/2019    Procedure: COLONOSCOPY, WITH CO2 INSUFFLATION;  Surgeon: Fabiana Ramos MD;  Location: [facility]     DILATION AND CURETTAGE, HYSTEROSCOPY DIAGNOSTIC, COMBINED N/A 07/13/2017     DILATION AND CURETTAGE, OPERATIVE HYSTEROSCOPY, COMBINED N/A 7/13/2017    Procedure: COMBINED DILATION AND CURETTAGE, OPERATIVE HYSTEROSCOPY;  Hysteroscopy, D&C, Mirena insertion;  Surgeon: Sierra Lane DO;  Location: [facility]     DILATION AND CURETTAGE, OPERATIVE HYSTEROSCOPY, COMBINED N/A 10/8/2020    Procedure: Hysteroscopy dilation and curettage, pap smear;  Surgeon: Sierra Lane DO;  Location: [facility]     HERNIA REPAIR, INGUINAL RT/LT  infant     INSERT INTRAUTERINE DEVICE N/A 7/13/2017    Procedure: INSERT INTRAUTERINE DEVICE;;  Surgeon: Sierra Lane DO;  Location: [facility]     THYROIDECTOMY  8/8/2012    Procedure: THYROIDECTOMY;  Total Thyroidectomy and Central Neck Dissection;  Surgeon: Philomena Ruiz MD;  Location: [facility] OR     Family History   Problem Relation Age of Onset     Asthma Father      Cancer Father         cirrhosis and liver cancer     Genetic Disorder Father         alpha 1  antitrypsin deficiency     Thyroid Disease Mother         also on blood thinner. unclear history     Social History     Tobacco Use     Smoking status: Former Smoker     Quit date: 1996     Years since quittin.4     Smokeless tobacco: Never Used   Substance Use Topics     Alcohol use: Yes     Comment: 0-1 per month          Objective  Vitals: /82 (BP Location: Right arm, Cuff Size: Adult Large)   Wt 112.9 kg (249 lb)   LMP  (LMP Unknown)   BMI 40.19 kg/m    BMI= Body mass index is 40.19 kg/m .       Pathology=10/8/2020:  SPECIMEN(S):   Endometrial curettings     FINAL DIAGNOSIS:   ENDOMETRIAL CURETTINGS:   - Endometrium with decidualized stroma and inactive glands, consistent   with exogenous progesterone effect   - Negative for hyperplasia or atypia       Assessment  1.)  S/p HD&C, pap smear on 10/8/2020=benign pathology  2.)  IUD in place      Plan  1.)  Follow-up as needed       Sierra Lane DO

## 2020-10-23 NOTE — PATIENT INSTRUCTIONS
Please call if you any questions.    [address]   [address]  [phone]        Sierra Lane,

## 2020-10-24 DIAGNOSIS — E89.0 S/P COMPLETE THYROIDECTOMY: ICD-10-CM

## 2020-10-24 DIAGNOSIS — E89.0 POSTOPERATIVE HYPOTHYROIDISM: ICD-10-CM

## 2020-10-24 DIAGNOSIS — E83.51 HYPOCALCEMIA: ICD-10-CM

## 2020-10-27 RX ORDER — LEVOTHYROXINE SODIUM 200 UG/1
200 TABLET ORAL DAILY
Qty: 90 TABLET | Refills: 2 | Status: SHIPPED | OUTPATIENT
Start: 2020-10-27 | End: 2021-06-28

## 2020-12-03 DIAGNOSIS — E89.0 S/P COMPLETE THYROIDECTOMY: ICD-10-CM

## 2020-12-03 DIAGNOSIS — E89.0 POSTOPERATIVE HYPOTHYROIDISM: ICD-10-CM

## 2020-12-03 DIAGNOSIS — E83.51 HYPOCALCEMIA: ICD-10-CM

## 2020-12-03 RX ORDER — CALCITRIOL 0.25 UG/1
0.25 CAPSULE, LIQUID FILLED ORAL 2 TIMES DAILY
Qty: 180 CAPSULE | Refills: 0 | Status: SHIPPED | OUTPATIENT
Start: 2020-12-03 | End: 2021-03-02

## 2020-12-03 NOTE — TELEPHONE ENCOUNTER
Pending Prescriptions:                       Disp   Refills    calcitRIOL (ROCALTROL) 0.25 MCG capsule    180 ca*0        Sig: Take 1 capsule (0.25 mcg) by mouth 2 times daily        Routing refill request to provider for review/approval because:  Drug not on the [facility] refill protocol   Last Seen: 09/30/20  Last Refilled: 9/1/20 180Tablets/Capsules  Refills: 0  Next Visit: SUBHASH Kerns RN  December 3, 2020

## 2020-12-07 LAB — LAB SCANNED RESULT: NORMAL

## 2020-12-10 NOTE — RESULT ENCOUNTER NOTE
Kami,     The thyroglobulin level, which is checked as a follow-up for papillary thyroid carcinoma, is stable and undetectable.  Please let me know if any questions regarding this result.   Owen Handley MD

## 2021-01-26 DIAGNOSIS — J45.30 MILD PERSISTENT ASTHMA WITHOUT COMPLICATION: Primary | ICD-10-CM

## 2021-01-28 NOTE — TELEPHONE ENCOUNTER
Routing refill request to provider for review/approval because:    Long-Acting Beta Agonist Inhalers Protocol [id number]/26/2021 09:56 AM   Order for Serevent, Striverdi, or Foradil and pt has steroid inhaler     Last Written Prescription Date:  7/31/2020  Last Fill Quantity: 3 inhalers,  # refills: 1   Last office visit: 9/30/2020 with prescribing provider:     Future Office Visit:      POLLY SchroederN, RN

## 2021-02-27 ENCOUNTER — HEALTH MAINTENANCE LETTER (OUTPATIENT)
Age: 51
End: 2021-02-27

## 2021-02-28 DIAGNOSIS — E83.51 HYPOCALCEMIA: ICD-10-CM

## 2021-02-28 DIAGNOSIS — E89.0 S/P COMPLETE THYROIDECTOMY: ICD-10-CM

## 2021-02-28 DIAGNOSIS — E89.0 POSTOPERATIVE HYPOTHYROIDISM: ICD-10-CM

## 2021-03-02 RX ORDER — CALCITRIOL 0.25 UG/1
0.25 CAPSULE, LIQUID FILLED ORAL 2 TIMES DAILY
Qty: 180 CAPSULE | Refills: 1 | Status: SHIPPED | OUTPATIENT
Start: 2021-03-02 | End: 2021-06-28

## 2021-03-02 NOTE — TELEPHONE ENCOUNTER
calcitRIOL (ROCALTROL) 0.25 MCG capsule      Last Written Prescription Date:  12/3/2020  Last Fill Quantity: 180 cap,   # refills: 0  Last Office Visit : 8/18/2020  Future Office visit:  6/28/2021    Routing refill request to provider for review/approval because:  Medication not on the Endocrine refill protocol.

## 2021-04-12 ENCOUNTER — TRANSFERRED RECORDS (OUTPATIENT)
Dept: HEALTH INFORMATION MANAGEMENT | Facility: CLINIC | Age: 51
End: 2021-04-12

## 2021-05-13 DIAGNOSIS — J45.30 MILD PERSISTENT ASTHMA WITHOUT COMPLICATION: ICD-10-CM

## 2021-05-13 NOTE — TELEPHONE ENCOUNTER
Pending Prescriptions:                       Disp   Refills    fluticasone-salmeterol (ADVAIR) 250-50 MCG*60 each0        Sig: INHALE ONE PUFF BY MOUTH TWICE DAILY       Routing refill request to provider for review/approval because:  Labs not current:  matt Kenny RN on 5/13/2021 at 1:37 PM

## 2021-05-14 NOTE — TELEPHONE ENCOUNTER
Spoke with pt and scheduled for 6/16, she only has enough meds for 13 days. Please send  Short refill to get her to her appt

## 2021-05-26 ENCOUNTER — TRANSFERRED RECORDS (OUTPATIENT)
Dept: HEALTH INFORMATION MANAGEMENT | Facility: CLINIC | Age: 51
End: 2021-05-26

## 2021-05-26 LAB
CREAT SERPL-MCNC: 0.89 MG/DL (ref 0.57–1.11)
GFR SERPL CREATININE-BSD FRML MDRD: >60 ML/MIN/1.73M2
GLUCOSE SERPL-MCNC: 95 MG/DL (ref 65–100)
POTASSIUM SERPL-SCNC: 3.1 MMOL/L (ref 3.5–5)

## 2021-06-15 NOTE — PROGRESS NOTES
"Assessment & Plan     Mild persistent asthma without complication  Patient reports she is doing well currently with her asthma control.  She is tolerating her medications well without concerns.  Refills given on the Advair 250-50 to use twice daily.  As well as the Singulair 10 mg once per day.  Recheck in 6 to 12 months.  Sooner as needed.  - fluticasone-salmeterol (ADVAIR) 250-50 MCG/DOSE inhaler; INHALE ONE PUFF BY MOUTH TWICE DAILY  - montelukast (SINGULAIR) 10 MG tablet; Take 1 tablet (10 mg) by mouth daily    Morbid obesity (H)  Discussed nutrition and activity level.  Patient reports that she is making some changes.    Other hypoparathyroidism (H)  Patient is currently under the care of endocrinology.  She denies any problems or concerns.  She has a follow-up next month scheduled.               BMI:   Estimated body mass index is 39.32 kg/m  as calculated from the following:    Height as of this encounter: 1.676 m (5' 6\").    Weight as of this encounter: 110.5 kg (243 lb 9.6 oz).   Weight management plan: Discussed healthy diet and exercise guidelines        Return in about 1 year (around 6/16/2022) for Routine preventive.    Fabiana Ramos MD  [facility]     Kami Natarajan is a 51 year old female who presents to clinic today for the following health issues:    History of Present Illness     Asthma:  She presents for follow up of asthma.  She has no cough, no wheezing, and no shortness of breath. She is using a relief medication a few times a month. She does not miss any doses of her controller medication throughout the week.Patient is aware of the following triggers: animal dander, cold air, dust mites, exercise or sports, humidity, mold, pollens, smoke, strong odors and fumes and upper respiratory infections. The patient has had a visit to the Emergency Room, Urgent Care or Hospital due to asthma since the last clinic visit. She has been to the Emergency Room or Urgent " "Care 0 times.She has had a Hospitalization 0 times.    She eats 4 or more servings of fruits and vegetables daily.She consumes 2 sweetened beverage(s) daily.She exercises with enough effort to increase her heart rate 30 to 60 minutes per day.  She exercises with enough effort to increase her heart rate 3 or less days per week.   She is taking medications regularly.     Recent ED visit-patient was seen in the emergency department due to abdominal pain on May 23.  She was noted to have acute diverticulitis.  This was her first episode.  She reports it was a difficult recovery.  She did have to go back to the emergency department a few days after the initial visit secondary to thrush developing.  She reports at this time she is feeling a lot better but still occasional stomach cramping.  No fevers.  Appetite is improved.  Thrush is resolved.    Hypoparathyroidism   Sees endocrinology end of the month. No concerns.           Review of Systems   CONSTITUTIONAL: NEGATIVE for fever, chills, change in weight  ENT/MOUTH: NEGATIVE for ear, mouth and throat problems  RESP: NEGATIVE for significant cough or SOB  CV: NEGATIVE for chest pain, palpitations or peripheral edema      Objective    /70   Pulse 80   Temp 98.6  F (37  C) (Temporal)   Resp 16   Ht 1.676 m (5' 6\")   Wt 110.5 kg (243 lb 9.6 oz)   SpO2 93%   BMI 39.32 kg/m    Body mass index is 39.32 kg/m .  Physical Exam   GENERAL: healthy, alert and no distress  NECK: no adenopathy, no asymmetry, masses, or scars and thyroid normal to palpation  RESP: lungs clear to auscultation - no rales, rhonchi or wheezes  CV: regular rate and rhythm, normal S1 S2, no S3 or S4, no murmur, click or rub, no peripheral edema and peripheral pulses strong  ABDOMEN: soft, mild bilateral lower quadrant tenderness, no hepatosplenomegaly, no masses and bowel sounds normal  MS: no gross musculoskeletal defects noted, no edema          "

## 2021-06-16 ENCOUNTER — OFFICE VISIT (OUTPATIENT)
Dept: FAMILY MEDICINE | Facility: CLINIC | Age: 51
End: 2021-06-16
Payer: COMMERCIAL

## 2021-06-16 VITALS
RESPIRATION RATE: 16 BRPM | WEIGHT: 243.6 LBS | BODY MASS INDEX: 39.15 KG/M2 | SYSTOLIC BLOOD PRESSURE: 124 MMHG | HEIGHT: 66 IN | OXYGEN SATURATION: 93 % | DIASTOLIC BLOOD PRESSURE: 70 MMHG | HEART RATE: 80 BPM | TEMPERATURE: 98.6 F

## 2021-06-16 DIAGNOSIS — J45.30 MILD PERSISTENT ASTHMA WITHOUT COMPLICATION: Primary | Chronic | ICD-10-CM

## 2021-06-16 DIAGNOSIS — E20.89 OTHER HYPOPARATHYROIDISM (H): ICD-10-CM

## 2021-06-16 DIAGNOSIS — E66.01 MORBID OBESITY (H): ICD-10-CM

## 2021-06-16 PROCEDURE — 90732 PPSV23 VACC 2 YRS+ SUBQ/IM: CPT | Performed by: FAMILY MEDICINE

## 2021-06-16 PROCEDURE — 90471 IMMUNIZATION ADMIN: CPT | Performed by: FAMILY MEDICINE

## 2021-06-16 PROCEDURE — 99214 OFFICE O/P EST MOD 30 MIN: CPT | Mod: 25 | Performed by: FAMILY MEDICINE

## 2021-06-16 RX ORDER — MONTELUKAST SODIUM 10 MG/1
1 TABLET ORAL DAILY
Qty: 90 TABLET | Refills: 3 | Status: SHIPPED | OUTPATIENT
Start: 2021-06-16 | End: 2022-07-19

## 2021-06-16 ASSESSMENT — MIFFLIN-ST. JEOR: SCORE: 1731.71

## 2021-06-16 ASSESSMENT — PAIN SCALES - GENERAL: PAINLEVEL: NO PAIN (0)

## 2021-06-16 NOTE — NURSING NOTE
Prior to immunization administration, verified patients identity using patient s name and date of birth. Please see Immunization Activity for additional information.     Screening Questionnaire for Adult Immunization    Are you sick today?   No   Do you have allergies to medications, food, a vaccine component or latex?   No   Have you ever had a serious reaction after receiving a vaccination?   No   Do you have a long-term health problem with heart, lung, kidney, or metabolic disease (e.g., diabetes), asthma, a blood disorder, no spleen, complement component deficiency, a cochlear implant, or a spinal fluid leak?  Are you on long-term aspirin therapy?   Yes   Do you have cancer, leukemia, HIV/AIDS, or any other immune system problem?   No   Do you have a parent, brother, or sister with an immune system problem?   No   In the past 3 months, have you taken medications that affect  your immune system, such as prednisone, other steroids, or anticancer drugs; drugs for the treatment of rheumatoid arthritis, Crohn s disease, or psoriasis; or have you had radiation treatments?   No   Have you had a seizure, or a brain or other nervous system problem?   No   During the past year, have you received a transfusion of blood or blood    products, or been given immune (gamma) globulin or antiviral drug?   No   For women: Are you pregnant or is there a chance you could become       pregnant during the next month?   No   Have you received any vaccinations in the past 4 weeks?   No     Immunization questionnaire was positive for at least one answer.  Notified provider.        Per orders of Dr. Ramos, injection of [id number] given by Leandra Madrigal CMA. Patient instructed to remain in clinic for 15 minutes afterwards, and to report any adverse reaction to me immediately.       Screening performed by Leandra Madrigal CMA on 6/16/2021 at 2:28 PM.   Retention Suture Bite Size: 3 mm

## 2021-06-17 ASSESSMENT — ASTHMA QUESTIONNAIRES: ACT_TOTALSCORE: 23

## 2021-06-28 ENCOUNTER — ANCILLARY PROCEDURE (OUTPATIENT)
Dept: ULTRASOUND IMAGING | Facility: CLINIC | Age: 51
End: 2021-06-28
Attending: INTERNAL MEDICINE
Payer: COMMERCIAL

## 2021-06-28 ENCOUNTER — OFFICE VISIT (OUTPATIENT)
Dept: ENDOCRINOLOGY | Facility: CLINIC | Age: 51
End: 2021-06-28
Payer: COMMERCIAL

## 2021-06-28 VITALS
SYSTOLIC BLOOD PRESSURE: 106 MMHG | WEIGHT: 243 LBS | DIASTOLIC BLOOD PRESSURE: 76 MMHG | HEART RATE: 79 BPM | OXYGEN SATURATION: 96 % | BODY MASS INDEX: 39.22 KG/M2

## 2021-06-28 DIAGNOSIS — C73 PAPILLARY THYROID CARCINOMA (H): ICD-10-CM

## 2021-06-28 DIAGNOSIS — E89.0 S/P COMPLETE THYROIDECTOMY: ICD-10-CM

## 2021-06-28 DIAGNOSIS — E87.6 HYPOKALEMIA: ICD-10-CM

## 2021-06-28 DIAGNOSIS — E89.0 POSTOPERATIVE HYPOTHYROIDISM: ICD-10-CM

## 2021-06-28 DIAGNOSIS — E83.51 HYPOCALCEMIA: ICD-10-CM

## 2021-06-28 DIAGNOSIS — C73 PAPILLARY THYROID CARCINOMA (H): Primary | ICD-10-CM

## 2021-06-28 LAB
ALBUMIN SERPL-MCNC: 3.8 G/DL (ref 3.4–5)
BUN SERPL-MCNC: 12 MG/DL (ref 7–30)
CALCIUM SERPL-MCNC: 8.7 MG/DL (ref 8.5–10.1)
CREAT SERPL-MCNC: 1 MG/DL (ref 0.52–1.04)
GFR SERPL CREATININE-BSD FRML MDRD: 65 ML/MIN/{1.73_M2}
POTASSIUM SERPL-SCNC: 4.4 MMOL/L (ref 3.4–5.3)
T4 FREE SERPL-MCNC: 1.38 NG/DL (ref 0.76–1.46)
TSH SERPL DL<=0.005 MIU/L-ACNC: 0.19 MU/L (ref 0.4–4)

## 2021-06-28 PROCEDURE — 84520 ASSAY OF UREA NITROGEN: CPT | Performed by: INTERNAL MEDICINE

## 2021-06-28 PROCEDURE — 82565 ASSAY OF CREATININE: CPT | Performed by: INTERNAL MEDICINE

## 2021-06-28 PROCEDURE — 84432 ASSAY OF THYROGLOBULIN: CPT | Mod: 90 | Performed by: INTERNAL MEDICINE

## 2021-06-28 PROCEDURE — 86800 THYROGLOBULIN ANTIBODY: CPT | Mod: 90 | Performed by: INTERNAL MEDICINE

## 2021-06-28 PROCEDURE — 84132 ASSAY OF SERUM POTASSIUM: CPT | Performed by: INTERNAL MEDICINE

## 2021-06-28 PROCEDURE — 99000 SPECIMEN HANDLING OFFICE-LAB: CPT | Performed by: INTERNAL MEDICINE

## 2021-06-28 PROCEDURE — 82040 ASSAY OF SERUM ALBUMIN: CPT | Performed by: INTERNAL MEDICINE

## 2021-06-28 PROCEDURE — 99215 OFFICE O/P EST HI 40 MIN: CPT | Performed by: INTERNAL MEDICINE

## 2021-06-28 PROCEDURE — 36415 COLL VENOUS BLD VENIPUNCTURE: CPT | Performed by: INTERNAL MEDICINE

## 2021-06-28 PROCEDURE — 76536 US EXAM OF HEAD AND NECK: CPT | Performed by: RADIOLOGY

## 2021-06-28 PROCEDURE — 84443 ASSAY THYROID STIM HORMONE: CPT | Performed by: INTERNAL MEDICINE

## 2021-06-28 PROCEDURE — 84439 ASSAY OF FREE THYROXINE: CPT | Performed by: INTERNAL MEDICINE

## 2021-06-28 PROCEDURE — 82310 ASSAY OF CALCIUM: CPT | Performed by: INTERNAL MEDICINE

## 2021-06-28 RX ORDER — IBUPROFEN 200 MG
2 CAPSULE ORAL 2 TIMES DAILY
Qty: 360 TABLET | Refills: 3 | Status: SHIPPED | OUTPATIENT
Start: 2021-06-28

## 2021-06-28 RX ORDER — LEVOTHYROXINE SODIUM 200 UG/1
200 TABLET ORAL DAILY
Qty: 90 TABLET | Refills: 2 | Status: SHIPPED | OUTPATIENT
Start: 2021-06-28 | End: 2022-03-31

## 2021-06-28 RX ORDER — CALCITRIOL 0.25 UG/1
0.25 CAPSULE, LIQUID FILLED ORAL 2 TIMES DAILY
Qty: 180 CAPSULE | Refills: 3 | Status: SHIPPED | OUTPATIENT
Start: 2021-06-28 | End: 2022-08-08

## 2021-06-28 NOTE — NURSING NOTE
Kami Natarajan's goals for this visit include:   Chief Complaint   Patient presents with     Thyroid Problem       She requests these members of her care team be copied on today's visit information: Yes     PCP: Fabiana Ramos    Referring Provider:  No referring provider defined for this encounter.    There were no vitals taken for this visit.    Do you need any medication refills at today's visit? yes

## 2021-06-28 NOTE — PATIENT INSTRUCTIONS
Kami,    #1 please separate levothyroxine tablet from calcium supplements by at least 4 hours.  2.  Please continue current dose of levothyroxine, calcium supplement, calcitriol, and vitamin D.  #3 please check blood work today and will contact you with results.   Please let me know if any questions in the meantime.

## 2021-06-28 NOTE — LETTER
"    6/28/2021         RE: Kami Natarajan  [address]  [place] [address]        Dear Colleague,    Thank you for referring your patient, Kami Natarajan, to the [facility]. Please see a copy of my visit note below.    Endocrinology Clinic Visit    Chief Complaint: Thyroid Problem     Information obtained from:Patient    Subjective:         HPI: Kami Natarajan is a 50 year old female with history of papillary thyroid carcinoma with follicular variant who is here for follow-up of the same.      She was diagnosed with papillary thyroid cancer in 2012 and underwent total thyroidectomy on 8/8/2012.  The full pathology report is documented below.  Pathology was well-differentiated invasive papillary carcinoma, follicular variant with a 2.1 cm on the left side and 0.8 cm on the right side.  There was lymphovascular invasion bilaterally.  \"A.  Thyroid, left lobe and isthmus, thyroidectomy:       -  Well-differentiated invasive papillary carcinoma, follicular  variant.       -  Tumor size:  2.1 cm.       -  Tumor is confined to the thyroid and extends to 0.2 cm to the  closest resection margin.       -  Lymphovascular invasion .       -  Two hyperplastic nodules, 0.4 and 0.3 cm in diameter.    B.  Thyroid, right lobe, thyroidectomy:       -  Well-differentiated invasive papillary thyroid carcinoma,  follicular variant.       -  Tumor size:  0.8 cm.       -  Tumor is confined to the thyroid and is 0.1 cm from the nearest  surgical margin.       -  No evidence of lymphovascular invasion.       -  Hyperplastic nodule, 0.2 cm in diameter.  C.  Lymph node and parathyroid, left neck, excision:       -  No evidence of malignancy in three lymph nodes (0/3).       -  Benign parathyroid tissue.  D.  Lymph node, right neck, excision:       -  No evidence of malignancy in one lymph node (0/1).\"    She underwent radioiodine ablation with 131 mCi of I-131 on 1/24/2013.  Post scan showed 2 foci of uptake in the " anterior neck which was consistent with residual thyroid tissue.  There was no significant uptake in other parts of the body.    Over the years she has had a thyroglobulin level which was undetectable.  Neck ultrasound also did not show any worrisome lymphadenopathy.    She reports that she is currently on levothyroxine 200 mcg daily.  Reports compliance with medication.  Denies hyper or hypothyroid symptoms including palpitation, GI symptoms, tremor, weight loss or hair/ skin changes.    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  She is currently on calcium 1200 mg twice daily and also calcitriol 0.25 mg twice daily.  Her last calcium labs indicated calcium was normal.     Recently she was diagnosed with diverticulosis.  Completed antibiotics.  Noted has had hypokalemia.    She also takes vitamin D supplement 1000 international unit(s) daily.  Denies any numbness or tingling.  Denies any muscle cramps.    Reports that she is physically active at home.     No Known Allergies    Current Outpatient Medications   Medication Sig Dispense Refill     albuterol (PROAIR HFA/PROVENTIL HFA/VENTOLIN HFA) 108 (90 Base) MCG/ACT inhaler Inhale 2 puffs into the lungs every 4 hours as needed for shortness of breath / dyspnea 1 Inhaler 6     calcitRIOL (ROCALTROL) 0.25 MCG capsule Take 1 capsule (0.25 mcg) by mouth 2 times daily 180 capsule 1     calcium 600 MG tablet Take 2 tablets by mouth 2 times daily       Fluticasone Propionate (FLONASE NA) Spray 1 spray in nostril daily       fluticasone-salmeterol (ADVAIR) 250-50 MCG/DOSE inhaler INHALE ONE PUFF BY MOUTH TWICE DAILY 180 each 3     ibuprofen (ADVIL/MOTRIN) 200 MG tablet Take 200 mg by mouth every 4 hours as needed for mild pain       levonorgestrel (MIRENA (52 MG)) 20 MCG/24HR IUD 1 each by Intrauterine route once        levothyroxine (SYNTHROID/LEVOTHROID) 200 MCG tablet Take 1 tablet (200 mcg) by mouth daily 90 tablet 2      loratadine (CLARITIN) 10 MG tablet Take 10 mg by mouth daily       montelukast (SINGULAIR) 10 MG tablet Take 1 tablet (10 mg) by mouth daily 90 tablet 3     omeprazole (PRILOSEC) 10 MG DR capsule Take 20 mg by mouth every other day       Probiotic Product (PROBIOTIC DAILY PO)        Vitamin D, Cholecalciferol, 1000 units CAPS          Review of Systems     as per HPI above        Objective:   /76 (BP Location: Left arm, Patient Position: Chair, Cuff Size: Adult Large)   Pulse 79   Wt 110.2 kg (243 lb)   SpO2 96%   BMI 39.22 kg/m    Constitutional: Pleasant no acute cardiopulmonary distress.   EYES: anicteric, normal extra-ocular movements, no lid lag or retraction, is equal and reactive to light bilaterally.  Cardiovascular: RRR, S1, S2 normal.   Pulmonary/Chest: CTAB. No wheezing or rales.   Neurological: Alert and oriented.  No tremor and Muscle strength 5/5.   Extremities: No edema.  Psychological: appropriate mood and affect     In House Labs:   Lab Results   Component Value Date    A1C 5.3 11/05/2014       TSH   Date Value Ref Range Status   10/15/2020 0.44 0.40 - 4.00 mU/L Final   12/23/2019 0.30 (L) 0.40 - 4.00 mU/L Final   06/24/2019 0.66 0.40 - 4.00 mU/L Final   01/04/2019 0.72 0.40 - 4.00 mU/L Final   01/15/2018 2.14 0.40 - 4.00 mU/L Final     T4 Free   Date Value Ref Range Status   12/23/2019 1.48 (H) 0.76 - 1.46 ng/dL Final   06/24/2019 1.53 (H) 0.76 - 1.46 ng/dL Final   01/04/2019 1.32 0.76 - 1.46 ng/dL Final   01/15/2018 1.48 (H) 0.76 - 1.46 ng/dL Final   11/21/2016 1.36 0.76 - 1.46 ng/dL Final       Creatinine   Date Value Ref Range Status   05/26/2021 0.89 0.57 - 1.11 mg/dL Final   ]    ENDO CALCIUM LABS-UMP Latest Ref Rng & Units 1/4/2019 1/15/2018   CALCIUM 8.5 - 10.1 mg/dL 8.5 8.3 (L)   PHOSPHOROUS 2.5 - 4.5 mg/dL 4.2 3.6   ALBUMIN 3.4 - 5.0 g/dL 3.5 3.8   BUN 7 - 30 mg/dL 8 9   CREATININE 0.52 - 1.04 mg/dL 1.11 (H) 1.02   PARATHYROID HORMONE INTACT 12 - 72 pg/mL       ENDO CALCIUM  LABS-UMP Latest Ref Rng & Units 7/10/2017 4/14/2017   CALCIUM 8.5 - 10.1 mg/dL 9.1 9.1   PHOSPHOROUS 2.5 - 4.5 mg/dL     ALBUMIN 3.4 - 5.0 g/dL     BUN 7 - 30 mg/dL 11 11   CREATININE 0.52 - 1.04 mg/dL 1.03 0.94   PARATHYROID HORMONE INTACT 12 - 72 pg/mL       ENDO CALCIUM LABS-UMP Latest Ref Rng & Units 11/21/2016 5/9/2016   CALCIUM 8.5 - 10.1 mg/dL 8.1 (L) 8.3 (L)   PHOSPHOROUS 2.5 - 4.5 mg/dL 3.7    ALBUMIN 3.4 - 5.0 g/dL 3.8    BUN 7 - 30 mg/dL     CREATININE 0.52 - 1.04 mg/dL     PARATHYROID HORMONE INTACT 12 - 72 pg/mL 7 (L)      Results for FARIDEH MALLOY (MRN [id number]) as of 6/28/2021 14:56   Ref. Range 6/28/2021 12:08   Potassium Latest Ref Range: 3.4 - 5.3 mmol/L 4.4   Urea Nitrogen Latest Ref Range: 7 - 30 mg/dL 12   Creatinine Latest Ref Range: 0.52 - 1.04 mg/dL 1.00   GFR Estimate Latest Ref Range: >60 mL/min/1.73_m2 65   GFR Estimate If Black Latest Ref Range: >60 mL/min/1.73_m2 75   Calcium Latest Ref Range: 8.5 - 10.1 mg/dL 8.7   Albumin Latest Ref Range: 3.4 - 5.0 g/dL 3.8   T4 Free Latest Ref Range: 0.76 - 1.46 ng/dL 1.38   TSH Latest Ref Range: 0.40 - 4.00 mU/L 0.19 (L)   EXAMINATION: US HEAD NECK SOFT TISSUE, 6/28/2021 12:12 PM      COMPARISON: 6/24/2019     HISTORY:  Papillary thyroid carcinoma (H)     FINDINGS:  Lymph nodes throughout the neck demonstrate normal morphology. Lymph  nodes are measured bilaterally, at Levels 2 to 7, with measurements as  follows:     Right:  Level 2: 1.1 x 0.8 x 0.6 cm  Level 3: 0.7 x 0.5 x 0.3 cm, 0.6 x 0.4 x 0.3 cm  Level 4: 1.1 x 0.8 x 0.3 cm, 0.7 x 0.6 x 0.2 cm, 1.1 x 0.8 x 0.6 cm  Level 5: 0.9 x 0.7 x 0.4 cm, 1.0 x 0.8 x 0.5 cm  Level 6: None  Level 7: None     Left:  Level 2: 1.1 x 0.9 x 0.7 cm, 0.7 x 0.6 x 0.3 cm  Level 3: 1.0 x 0.7 x 0.4 cm, 0.8 x 0.5 x 0.2 cm  Level 4: 0.6 x 0.6 x 0.3 cm, 1.4 x 1.0 x 0.6 cm, 0.7 x 0.6 x 0.4 cm  Level 5: 0.6 x 0.6 x 0.5 cm  Level 6: None  Level 7: None                                                                       IMPRESSION:  1.  Soft tissue neck ultrasound with lymph node measurements as  described above. No abnormal lymphadenopathy.    Assessment/Treatment Plan:      History of papillary thyroid carcinoma; follicular variant: diagnosed with papillary thyroid cancer in 2012 and underwent total thyroidectomy on 8/8/2012.  Pathology was well-differentiated invasive papillary carcinoma, follicular variant with a 2.1 cm on the left side and 0.8 cm on the right side.  There was lymphovascular invasion bilaterally. She underwent radioiodine ablation with 131 mCi of I-131 on 1/24/2013.  Post scan showed 2 foci of uptake in the anterior neck which was consistent with residual thyroid tissue.  There was no significant uptake in other parts of the body.  Thyroglobulin level has been undetectable after surgery.  Unremarkable neck ultrasound finding-last done 6/2019.    Check TSH - TSH suppressed. Levothyroxine 200 mcg six days a week and half a tablet one day a week.     Check thyroglobulin level    Follow up neck ultrasound ordered today.      No evidence of biochemical or structural recurrence therefore TSH goal around 0.5.      Hypocalcemia secondary to hypoparathyroidism: She is currently on calcium 1200 mg twice daily and also calcitriol 0.25 mg twice daily.  We will check to follow-up calcium level which was stable today.  Continue current calcitriol and calcium supplement.    Hypokalemia - follow up potasium was normal today.      Patient Instructions   Kami,    #1 please separate levothyroxine tablet from calcium supplements by at least 4 hours.  2.  Please continue current dose of levothyroxine, calcium supplement, calcitriol, and vitamin D.  #3 please check blood work today and will contact you with results.   Please let me know if any questions in the meantime.      I will contact the patient with the test results.  Return to clinic in 12 months.    Test and/or medications prescribed today:  Orders Placed This Encounter    Procedures     US head neck soft tissue     TSH with free T4 reflex     Thyroglobulin and Antibody (Sendout to [facility])     Albumin level     Calcium     Urea nitrogen     Creatinine     Potassium       40 minutes spent on the date of the encounter doing chart review, history and exam, documentation and further activities per the note.    Owen Handley MD  Staff Endocrinologist    Division of Endocrinology and Diabetes              Again, thank you for allowing me to participate in the care of your patient.        Sincerely,        Owen Handley MD

## 2021-06-28 NOTE — PROGRESS NOTES
"Endocrinology Clinic Visit    Chief Complaint: Thyroid Problem     Information obtained from:Patient    Subjective:         HPI: Kami Natarajan is a 50 year old female with history of papillary thyroid carcinoma with follicular variant who is here for follow-up of the same.      She was diagnosed with papillary thyroid cancer in 2012 and underwent total thyroidectomy on 8/8/2012.  The full pathology report is documented below.  Pathology was well-differentiated invasive papillary carcinoma, follicular variant with a 2.1 cm on the left side and 0.8 cm on the right side.  There was lymphovascular invasion bilaterally.  \"A.  Thyroid, left lobe and isthmus, thyroidectomy:       -  Well-differentiated invasive papillary carcinoma, follicular  variant.       -  Tumor size:  2.1 cm.       -  Tumor is confined to the thyroid and extends to 0.2 cm to the  closest resection margin.       -  Lymphovascular invasion .       -  Two hyperplastic nodules, 0.4 and 0.3 cm in diameter.    B.  Thyroid, right lobe, thyroidectomy:       -  Well-differentiated invasive papillary thyroid carcinoma,  follicular variant.       -  Tumor size:  0.8 cm.       -  Tumor is confined to the thyroid and is 0.1 cm from the nearest  surgical margin.       -  No evidence of lymphovascular invasion.       -  Hyperplastic nodule, 0.2 cm in diameter.  C.  Lymph node and parathyroid, left neck, excision:       -  No evidence of malignancy in three lymph nodes (0/3).       -  Benign parathyroid tissue.  D.  Lymph node, right neck, excision:       -  No evidence of malignancy in one lymph node (0/1).\"    She underwent radioiodine ablation with 131 mCi of I-131 on 1/24/2013.  Post scan showed 2 foci of uptake in the anterior neck which was consistent with residual thyroid tissue.  There was no significant uptake in other parts of the body.    Over the years she has had a thyroglobulin level which was undetectable.  Neck ultrasound also did not show any " worrisome lymphadenopathy.    She reports that she is currently on levothyroxine 200 mcg daily.  Reports compliance with medication.  Denies hyper or hypothyroid symptoms including palpitation, GI symptoms, tremor, weight loss or hair/ skin changes.    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  She is currently on calcium 1200 mg twice daily and also calcitriol 0.25 mg twice daily.  Her last calcium labs indicated calcium was normal.     Recently she was diagnosed with diverticulosis.  Completed antibiotics.  Noted has had hypokalemia.    She also takes vitamin D supplement 1000 international unit(s) daily.  Denies any numbness or tingling.  Denies any muscle cramps.    Reports that she is physically active at home.     No Known Allergies    Current Outpatient Medications   Medication Sig Dispense Refill     albuterol (PROAIR HFA/PROVENTIL HFA/VENTOLIN HFA) 108 (90 Base) MCG/ACT inhaler Inhale 2 puffs into the lungs every 4 hours as needed for shortness of breath / dyspnea 1 Inhaler 6     calcitRIOL (ROCALTROL) 0.25 MCG capsule Take 1 capsule (0.25 mcg) by mouth 2 times daily 180 capsule 1     calcium 600 MG tablet Take 2 tablets by mouth 2 times daily       Fluticasone Propionate (FLONASE NA) Spray 1 spray in nostril daily       fluticasone-salmeterol (ADVAIR) 250-50 MCG/DOSE inhaler INHALE ONE PUFF BY MOUTH TWICE DAILY 180 each 3     ibuprofen (ADVIL/MOTRIN) 200 MG tablet Take 200 mg by mouth every 4 hours as needed for mild pain       levonorgestrel (MIRENA (52 MG)) 20 MCG/24HR IUD 1 each by Intrauterine route once        levothyroxine (SYNTHROID/LEVOTHROID) 200 MCG tablet Take 1 tablet (200 mcg) by mouth daily 90 tablet 2     loratadine (CLARITIN) 10 MG tablet Take 10 mg by mouth daily       montelukast (SINGULAIR) 10 MG tablet Take 1 tablet (10 mg) by mouth daily 90 tablet 3     omeprazole (PRILOSEC) 10 MG DR capsule Take 20 mg by mouth every other day        Probiotic Product (PROBIOTIC DAILY PO)        Vitamin D, Cholecalciferol, 1000 units CAPS          Review of Systems     as per HPI above        Objective:   /76 (BP Location: Left arm, Patient Position: Chair, Cuff Size: Adult Large)   Pulse 79   Wt 110.2 kg (243 lb)   SpO2 96%   BMI 39.22 kg/m    Constitutional: Pleasant no acute cardiopulmonary distress.   EYES: anicteric, normal extra-ocular movements, no lid lag or retraction, is equal and reactive to light bilaterally.  Cardiovascular: RRR, S1, S2 normal.   Pulmonary/Chest: CTAB. No wheezing or rales.   Neurological: Alert and oriented.  No tremor and Muscle strength 5/5.   Extremities: No edema.  Psychological: appropriate mood and affect     In House Labs:   Lab Results   Component Value Date    A1C 5.3 11/05/2014       TSH   Date Value Ref Range Status   10/15/2020 0.44 0.40 - 4.00 mU/L Final   12/23/2019 0.30 (L) 0.40 - 4.00 mU/L Final   06/24/2019 0.66 0.40 - 4.00 mU/L Final   01/04/2019 0.72 0.40 - 4.00 mU/L Final   01/15/2018 2.14 0.40 - 4.00 mU/L Final     T4 Free   Date Value Ref Range Status   12/23/2019 1.48 (H) 0.76 - 1.46 ng/dL Final   06/24/2019 1.53 (H) 0.76 - 1.46 ng/dL Final   01/04/2019 1.32 0.76 - 1.46 ng/dL Final   01/15/2018 1.48 (H) 0.76 - 1.46 ng/dL Final   11/21/2016 1.36 0.76 - 1.46 ng/dL Final       Creatinine   Date Value Ref Range Status   05/26/2021 0.89 0.57 - 1.11 mg/dL Final   ]    ENDO CALCIUM LABS-UMP Latest Ref Rng & Units 1/4/2019 1/15/2018   CALCIUM 8.5 - 10.1 mg/dL 8.5 8.3 (L)   PHOSPHOROUS 2.5 - 4.5 mg/dL 4.2 3.6   ALBUMIN 3.4 - 5.0 g/dL 3.5 3.8   BUN 7 - 30 mg/dL 8 9   CREATININE 0.52 - 1.04 mg/dL 1.11 (H) 1.02   PARATHYROID HORMONE INTACT 12 - 72 pg/mL       ENDO CALCIUM LABS-P Latest Ref Rng & Units 7/10/2017 4/14/2017   CALCIUM 8.5 - 10.1 mg/dL 9.1 9.1   PHOSPHOROUS 2.5 - 4.5 mg/dL     ALBUMIN 3.4 - 5.0 g/dL     BUN 7 - 30 mg/dL 11 11   CREATININE 0.52 - 1.04 mg/dL 1.03 0.94   PARATHYROID HORMONE INTACT 12  - 72 pg/mL       ENDO CALCIUM LABS-UMP Latest Ref Rng & Units 11/21/2016 5/9/2016   CALCIUM 8.5 - 10.1 mg/dL 8.1 (L) 8.3 (L)   PHOSPHOROUS 2.5 - 4.5 mg/dL 3.7    ALBUMIN 3.4 - 5.0 g/dL 3.8    BUN 7 - 30 mg/dL     CREATININE 0.52 - 1.04 mg/dL     PARATHYROID HORMONE INTACT 12 - 72 pg/mL 7 (L)      Results for FARIDEH MALLOY (MRN [id number]) as of 6/28/2021 14:56   Ref. Range 6/28/2021 12:08   Potassium Latest Ref Range: 3.4 - 5.3 mmol/L 4.4   Urea Nitrogen Latest Ref Range: 7 - 30 mg/dL 12   Creatinine Latest Ref Range: 0.52 - 1.04 mg/dL 1.00   GFR Estimate Latest Ref Range: >60 mL/min/1.73_m2 65   GFR Estimate If Black Latest Ref Range: >60 mL/min/1.73_m2 75   Calcium Latest Ref Range: 8.5 - 10.1 mg/dL 8.7   Albumin Latest Ref Range: 3.4 - 5.0 g/dL 3.8   T4 Free Latest Ref Range: 0.76 - 1.46 ng/dL 1.38   TSH Latest Ref Range: 0.40 - 4.00 mU/L 0.19 (L)   EXAMINATION: US HEAD NECK SOFT TISSUE, 6/28/2021 12:12 PM      COMPARISON: 6/24/2019     HISTORY:  Papillary thyroid carcinoma (H)     FINDINGS:  Lymph nodes throughout the neck demonstrate normal morphology. Lymph  nodes are measured bilaterally, at Levels 2 to 7, with measurements as  follows:     Right:  Level 2: 1.1 x 0.8 x 0.6 cm  Level 3: 0.7 x 0.5 x 0.3 cm, 0.6 x 0.4 x 0.3 cm  Level 4: 1.1 x 0.8 x 0.3 cm, 0.7 x 0.6 x 0.2 cm, 1.1 x 0.8 x 0.6 cm  Level 5: 0.9 x 0.7 x 0.4 cm, 1.0 x 0.8 x 0.5 cm  Level 6: None  Level 7: None     Left:  Level 2: 1.1 x 0.9 x 0.7 cm, 0.7 x 0.6 x 0.3 cm  Level 3: 1.0 x 0.7 x 0.4 cm, 0.8 x 0.5 x 0.2 cm  Level 4: 0.6 x 0.6 x 0.3 cm, 1.4 x 1.0 x 0.6 cm, 0.7 x 0.6 x 0.4 cm  Level 5: 0.6 x 0.6 x 0.5 cm  Level 6: None  Level 7: None                                                                      IMPRESSION:  1.  Soft tissue neck ultrasound with lymph node measurements as  described above. No abnormal lymphadenopathy.    Assessment/Treatment Plan:      History of papillary thyroid carcinoma; follicular variant: diagnosed with papillary  thyroid cancer in 2012 and underwent total thyroidectomy on 8/8/2012.  Pathology was well-differentiated invasive papillary carcinoma, follicular variant with a 2.1 cm on the left side and 0.8 cm on the right side.  There was lymphovascular invasion bilaterally. She underwent radioiodine ablation with 131 mCi of I-131 on 1/24/2013.  Post scan showed 2 foci of uptake in the anterior neck which was consistent with residual thyroid tissue.  There was no significant uptake in other parts of the body.  Thyroglobulin level has been undetectable after surgery.  Unremarkable neck ultrasound finding-last done 6/2019.    Check TSH - TSH suppressed. Levothyroxine 200 mcg six days a week and half a tablet one day a week.     Check thyroglobulin level    Follow up neck ultrasound ordered today.      No evidence of biochemical or structural recurrence therefore TSH goal around 0.5.      Hypocalcemia secondary to hypoparathyroidism: She is currently on calcium 1200 mg twice daily and also calcitriol 0.25 mg twice daily.  We will check to follow-up calcium level which was stable today.  Continue current calcitriol and calcium supplement.    Hypokalemia - follow up potasium was normal today.      Patient Instructions   Kami,    #1 please separate levothyroxine tablet from calcium supplements by at least 4 hours.  2.  Please continue current dose of levothyroxine, calcium supplement, calcitriol, and vitamin D.  #3 please check blood work today and will contact you with results.   Please let me know if any questions in the meantime.      I will contact the patient with the test results.  Return to clinic in 12 months.    Test and/or medications prescribed today:  Orders Placed This Encounter   Procedures     US head neck soft tissue     TSH with free T4 reflex     Thyroglobulin and Antibody (Sendout to [facility])     Albumin level     Calcium     Urea nitrogen     Creatinine     Potassium       40 minutes spent on the date of the encounter  doing chart review, history and exam, documentation and further activities per the note.    Owen Handley MD  Staff Endocrinologist    Division of Endocrinology and Diabetes

## 2021-06-29 NOTE — RESULT ENCOUNTER NOTE
Kami,    Neck ultrasound findings are stable.  The ultrasound showed lymph nodes on both sides of the neck which have normal characteristics.  Please refer to the attached report for additional information and let me know if any questions.  Owen Handley MD

## 2021-07-02 NOTE — RESULT ENCOUNTER NOTE
Kami,    TSH slightly lower than the recommended range.  I recommend slightly reducing the levothyroxine dose to 200 mcg 6 days a week and half a tablet or 100 mcg 1 day a week.  This is about 7% dose reduction from the weekly dose.  Please let me know if any questions.  Owen Handley MD

## 2021-07-06 LAB — LAB SCANNED RESULT: NORMAL

## 2021-07-09 NOTE — RESULT ENCOUNTER NOTE
Kami,    The thyroglobulin level (monitoring for thyroid cancer ) is stable compared to previous results.   Please let me know if any questions.   Owen Handley MD

## 2021-08-06 NOTE — TELEPHONE ENCOUNTER
Associated Diagnoses     Metrorrhagia [N92.1]        Cervical mass [N88.8]          Order Questions     Question  Answer  Comment    Procedure name(s) - multi select  Hysteroscopy dilation and curettage, cervical biopsy     Is this a multi surgeon case?  No     Laterality  N/A     Reason for procedure  Metrorrhagia, cervical mass     Location of Case:  [facility]     Surgeon Procedure Time (incision to closure) in minutes (per procedure as applicable)  30     Note:  Surgical Case Time Needed (in minutes)    Patient Class (for admit prior to surgery, specify number of days in comments):  Same day (surgery center outpatient)     Anesthesia  MAC     H&P To Be Completed By:  PCP     Post-Op Appointment  2 weeks     Vendor Needed?  No       Surgery Scheduled    Date of Surgery 10/8 Time of Surgery 11:30 a.m.  Type of Anesthesia Anticipated: Local with MAC  Pre-Op: On 9/30 with Dr. Ramos at 4:40  p.m.  Post-Op:  2 weeks on 10/23 with Dr. Lane at 4:30 p.m.  Pre-certification routed to [employer]:  Yes automatically.    Surgery packet mailed to patient's home address: Yes  Patient instructed NPO 12 hours prior to surgery, arrive 1 hr 15 min hours prior to surgery, must have a  drop her off at the [place] [facility].  Patient understood and agrees to the plan.      COVID testing:  10/5 8:15 at [facility].    Surgery Pre-Certification    Medical Record Number: [id number]  Kami Natarajan  YOB: 1970   Phone: [phone] (home) [phone] (work)  Primary Provider: Fabiana Ramos    Reason for Admit:  Metrorrhagia, cervical mass     Surgeon: PADMINI Lane DO  Surgical Procedure: Hysteroscopy dilation and curettage, cervical biopsy   ICD-9 Coded:   Metrorrhagia [N92.1]        Cervical mass [N88.8]            Date of Surgery: 10/8  Consent signed? N/A      Hospital: [facility]  Outpatient    Requestor:  Suad Odonnell     Location:  [place]    
Not applicable

## 2021-10-02 ENCOUNTER — HEALTH MAINTENANCE LETTER (OUTPATIENT)
Age: 51
End: 2021-10-02

## 2022-03-29 DIAGNOSIS — E89.0 POSTOPERATIVE HYPOTHYROIDISM: ICD-10-CM

## 2022-03-31 RX ORDER — LEVOTHYROXINE SODIUM 200 UG/1
200 TABLET ORAL DAILY
Qty: 90 TABLET | Refills: 1 | Status: SHIPPED | OUTPATIENT
Start: 2022-03-31 | End: 2022-08-08

## 2022-03-31 NOTE — TELEPHONE ENCOUNTER
Levothyroxine Sodium Oral Tablet 200 MCG  Last Written Prescription Date:   6/28/2021  Last Fill Quantity: 90,   # refills: 2  Last Office Visit :  6/28/2021  Future Office visit:   6/18/2022    Routing refill request to provider for review/approval because:  Abnormal TSH  Refer to Provider for review    Recent Labs   Lab Test 06/28/21  1208   TSH 0.19*       Faviola Thompson RN  Central Triage Red Flags/Med Refills

## 2022-05-25 PROBLEM — C73 PAPILLARY THYROID CARCINOMA (H): Status: ACTIVE | Noted: 2022-05-25

## 2022-05-26 ENCOUNTER — OFFICE VISIT (OUTPATIENT)
Dept: FAMILY MEDICINE | Facility: OTHER | Age: 52
End: 2022-05-26
Payer: COMMERCIAL

## 2022-05-26 VITALS
DIASTOLIC BLOOD PRESSURE: 80 MMHG | SYSTOLIC BLOOD PRESSURE: 120 MMHG | OXYGEN SATURATION: 96 % | RESPIRATION RATE: 22 BRPM | BODY MASS INDEX: 40.05 KG/M2 | HEART RATE: 80 BPM | WEIGHT: 249.2 LBS | HEIGHT: 66 IN | TEMPERATURE: 97 F

## 2022-05-26 DIAGNOSIS — J20.8 VIRAL BRONCHITIS: Primary | ICD-10-CM

## 2022-05-26 DIAGNOSIS — K57.32 DIVERTICULITIS OF COLON: ICD-10-CM

## 2022-05-26 DIAGNOSIS — J45.31 MILD PERSISTENT ASTHMA WITH ACUTE EXACERBATION: ICD-10-CM

## 2022-05-26 DIAGNOSIS — L03.011 PARONYCHIA, FINGER, RIGHT: ICD-10-CM

## 2022-05-26 PROCEDURE — 99214 OFFICE O/P EST MOD 30 MIN: CPT | Performed by: PHYSICIAN ASSISTANT

## 2022-05-26 RX ORDER — METRONIDAZOLE 500 MG/1
500 TABLET ORAL 3 TIMES DAILY
Qty: 21 TABLET | Refills: 0 | Status: SHIPPED | OUTPATIENT
Start: 2022-05-26 | End: 2022-06-02

## 2022-05-26 RX ORDER — CIPROFLOXACIN 500 MG/1
500 TABLET, FILM COATED ORAL 2 TIMES DAILY
Qty: 14 TABLET | Refills: 0 | Status: SHIPPED | OUTPATIENT
Start: 2022-05-26 | End: 2022-06-02

## 2022-05-26 RX ORDER — BENZONATATE 100 MG/1
100 CAPSULE ORAL 3 TIMES DAILY PRN
Qty: 30 CAPSULE | Refills: 0 | Status: SHIPPED | OUTPATIENT
Start: 2022-05-26 | End: 2022-10-21

## 2022-05-26 ASSESSMENT — ENCOUNTER SYMPTOMS
COUGH: 1
SHORTNESS OF BREATH: 1

## 2022-05-26 ASSESSMENT — LIFESTYLE VARIABLES: SMOKING_STATUS: 0

## 2022-05-26 NOTE — PROGRESS NOTES
Assessment & Plan          ICD-10-CM    1. Viral bronchitis  J20.8 benzonatate (TESSALON) 100 MG capsule   2. Mild persistent asthma with acute exacerbation  J45.31    3. Diverticulitis of colon  K57.32 metroNIDAZOLE (FLAGYL) 500 MG tablet     ciprofloxacin (CIPRO) 500 MG tablet   4. Paronychia, finger, right  L03.011        1-2. Fairly frequent cough throughout the visit but she states she actually feels better today compared to the past week. Normal vitals and lungs are clear. I do not feel antibiotics are necessary and will avoid prednisone given her diverticulitis flare. Continue with supportive care including plenty of fluids, albuterol inhaler and Tessalon pearls which will be sent to her pharmacy. If not improving, contact the clinic.    3. Lower abdominal tenderness noted on exam. She certainly could be experiencing an early diverticulitis flare and given her flare last year that led to 2 different ED visits, will treat with Flagyl and Cipro given that Augmentin causes stomach upset. Continue with a bland diet with plenty of clear liquids and if worsening, she should be seen in the ED. I do not feel imaging is currently necessary.    4. Right paronychia improving with frequent warm, soapy water soaks so she will continue with these.           Ru Rivas PA-C  [facility] KALEIGH Mcclure is a 52 year old who presents for the following health issues     Cough  This is a new problem. The current episode started more than 1 week ago. The problem occurs constantly. The problem has not changed since onset.The cough is productive of sputum. There has been no fever. Associated symptoms include chest pain and shortness of breath. She has tried cough syrup for the symptoms. She is not a smoker.      She had a phone visit with Dr. Cruz last night who recommended patient be seen in person for further evaluation. She has had a productive cough present since last Thursday but seems  "to be slightly improving today. She also has some nasal congestion. She had a negative home COVID test last night. She has had some slight wheezing but not much shortness of breath. She has been using her rescue inhaler daily for the past week which helps. She also has generalized abdominal pain with a history of diverticulitis last year. This pain feels similar to when her diverticulitis started so she wants to know if she needs to be treated. She denies nausea, vomiting, fevers or chills. No bloody stools. She also has a infection near the nail of her right finger. She has been soaking in daily and it is improving. It did open up and drain some thin, purulent fluid earlier this week.     ROS:   Constitutional, HEENT, cardiovascular, pulmonary, gi and gu systems are negative, except as otherwise noted.      Objective    /80   Pulse 80   Temp 97  F (36.1  C) (Temporal)   Resp 22   Ht 1.676 m (5' 6\")   Wt 113 kg (249 lb 3.2 oz)   SpO2 96%   BMI 40.22 kg/m    Body mass index is 40.22 kg/m .  Physical Exam   GENERAL: healthy, alert and no distress  EYES: Eyes grossly normal to inspection, PERRL and conjunctivae and sclerae normal  HENT: ear canals and TM's normal, nose and mouth without ulcers or lesions, no sinus tenderness.  NECK: no adenopathy, no asymmetry, masses, or scars and thyroid normal to palpation  RESP: lungs clear to auscultation - no rales, rhonchi or wheezes  CV: regular rate and rhythm, normal S1 S2, no S3 or S4, no murmur, click or rub, no peripheral edema  ABDOMEN: soft, mild tenderness throughout the lower abdomen without rebound or guarding, no hepatosplenomegaly, no masses and bowel sounds normal  SKIN: mild erythema, swelling and tenderness over skin just medial to right 3rd fingernail. No discharge or fluctuance.   NEURO: Normal strength and tone, mentation intact and speech normal. Gait is stable.   PSYCH: mentation appears normal, affect normal/bright        "

## 2022-05-26 NOTE — PATIENT INSTRUCTIONS
Will treat you with cipro and Flagyl for the diverticulitis.  Stay off calcium while on this.  Continue with a bland diet and plenty of fluids.  If symptoms worsen, you should be seen right away.    Your bronchitis is likely viral.  Continue with your inhaler as needed along with rest.  Can use Tessalon pearls for the cough.    Continue soaking your finger.

## 2022-06-22 ENCOUNTER — TRANSFERRED RECORDS (OUTPATIENT)
Dept: HEALTH INFORMATION MANAGEMENT | Facility: CLINIC | Age: 52
End: 2022-06-22

## 2022-07-18 DIAGNOSIS — J45.30 MILD PERSISTENT ASTHMA WITHOUT COMPLICATION: Chronic | ICD-10-CM

## 2022-07-18 NOTE — LETTER
July 22, 2022      Kami MENDOZA organ  [address] [place] [address]              Dear Kmai,    Your [facility] Care Team works hard to make sure that you and your family receive exceptional care. Enclosed you will find a copy of the Asthma Control Test (ACT) that our clinic uses to monitor and manage your asthma. This test is an assessment tool that we use to determine how well your asthma is controlled.  Please keep a copy of the ACT for your reference when we call you to complete this assessment.   If you have [facility] we can send you a link to complete the Asthma Control Test through [employer]. If you are interested in signing up for [employer], please stop in at your nearest [facility] and any staff member will be able to assist you.    Sincerely,    Your [facility] Care Team

## 2022-07-19 RX ORDER — MONTELUKAST SODIUM 10 MG/1
1 TABLET ORAL DAILY
Qty: 90 TABLET | Refills: 0 | Status: SHIPPED | OUTPATIENT
Start: 2022-07-19 | End: 2022-10-17

## 2022-07-19 NOTE — TELEPHONE ENCOUNTER
"Pending Prescriptions:                       Disp   Refills    montelukast (SINGULAIR) 10 MG tablet [Phar*90 tab*0        Sig: Take 1 tablet (10 mg) by mouth daily    Routing refill request to provider for review/approval because:    ACT and ATAQ Scores  8/23/2019   12/10/2019   1/27/2020   7/31/2020   8/17/2020   6/16/2021   5/25/2022    ACT TOTAL SCORE (Goal Greater than or Equal to 20) 23 19 21 23 23 23 18       Requested Prescriptions   Pending Prescriptions Disp Refills    montelukast (SINGULAIR) 10 MG tablet [Pharmacy Med Name: Montelukast Sodium Oral Tablet 10 MG] 90 tablet 0     Sig: Take 1 tablet (10 mg) by mouth daily        Leukotriene Inhibitors Protocol Failed - 7/18/2022  2:01 AM        Failed - Asthma control assessment score within normal limits in last 6 months     Please review ACT score.           Passed - Patient is age 12 or older     If patient is under 16, ok to refill using age based dosing.           Passed - Medication is active on med list        Passed - Recent (6 mo) or future (30 days) visit within the authorizing provider's specialty     Patient had office visit in the last 6 months or has a visit in the next 30 days with authorizing provider or within the authorizing provider's specialty.  See \"Patient Info\" tab in inbasket, or \"Choose Columns\" in Meds & Orders section of the refill encounter.                  "

## 2022-07-20 ENCOUNTER — MYC MEDICAL ADVICE (OUTPATIENT)
Dept: FAMILY MEDICINE | Facility: CLINIC | Age: 52
End: 2022-07-20

## 2022-07-22 ASSESSMENT — ENCOUNTER SYMPTOMS
LIGHT-HEADEDNESS: 1
HYPOTENSION: 0
HEMOPTYSIS: 0
CONSTIPATION: 0
BLOOD IN STOOL: 0
SLEEP DISTURBANCES DUE TO BREATHING: 0
EYE IRRITATION: 0
EYE REDNESS: 0
PALPITATIONS: 0
COUGH: 1
NAUSEA: 0
POSTURAL DYSPNEA: 0
BLOATING: 0
JAUNDICE: 0
EYE WATERING: 0
DIARRHEA: 1
VOMITING: 0
EXERCISE INTOLERANCE: 0
LEG PAIN: 0
HEARTBURN: 1
ORTHOPNEA: 0
EYE PAIN: 0
RECTAL PAIN: 0
SNORES LOUDLY: 1
COUGH DISTURBING SLEEP: 0
WHEEZING: 0
DYSPNEA ON EXERTION: 0
BOWEL INCONTINENCE: 0
SYNCOPE: 0
ABDOMINAL PAIN: 0
HYPERTENSION: 0
DOUBLE VISION: 0
SPUTUM PRODUCTION: 1
SHORTNESS OF BREATH: 0

## 2022-07-22 ASSESSMENT — ASTHMA QUESTIONNAIRES: ACT_TOTALSCORE: 23

## 2022-07-22 NOTE — TELEPHONE ENCOUNTER
Letter sent with ACT-pt has not read [employer].    Leandra Madrigal CMA ([facility])

## 2022-07-23 DIAGNOSIS — J45.30 MILD PERSISTENT ASTHMA WITHOUT COMPLICATION: Chronic | ICD-10-CM

## 2022-07-25 RX ORDER — FLUTICASONE PROPIONATE AND SALMETEROL 250; 50 UG/1; UG/1
POWDER RESPIRATORY (INHALATION)
Qty: 180 EACH | Refills: 0 | Status: SHIPPED | OUTPATIENT
Start: 2022-07-25 | End: 2022-10-21

## 2022-08-02 ENCOUNTER — VIRTUAL VISIT (OUTPATIENT)
Dept: ENDOCRINOLOGY | Facility: CLINIC | Age: 52
End: 2022-08-02
Payer: COMMERCIAL

## 2022-08-02 DIAGNOSIS — E20.89 OTHER HYPOPARATHYROIDISM (H): ICD-10-CM

## 2022-08-02 DIAGNOSIS — C73 PAPILLARY THYROID CARCINOMA (H): Primary | ICD-10-CM

## 2022-08-02 DIAGNOSIS — E66.812 CLASS 2 OBESITY DUE TO EXCESS CALORIES WITHOUT SERIOUS COMORBIDITY WITH BODY MASS INDEX (BMI) OF 39.0 TO 39.9 IN ADULT: ICD-10-CM

## 2022-08-02 DIAGNOSIS — E89.0 POSTOPERATIVE HYPOTHYROIDISM: ICD-10-CM

## 2022-08-02 DIAGNOSIS — E66.09 CLASS 2 OBESITY DUE TO EXCESS CALORIES WITHOUT SERIOUS COMORBIDITY WITH BODY MASS INDEX (BMI) OF 39.0 TO 39.9 IN ADULT: ICD-10-CM

## 2022-08-02 DIAGNOSIS — E83.51 HYPOCALCEMIA: ICD-10-CM

## 2022-08-02 PROCEDURE — 99215 OFFICE O/P EST HI 40 MIN: CPT | Mod: 95 | Performed by: INTERNAL MEDICINE

## 2022-08-02 NOTE — PROGRESS NOTES
"Endocrinology Clinic Visit    Chief Complaint: RECHECK (1 year follow up/follow up thyroid)     Information obtained from:Patient    Subjective:         HPI: Kami Natarajan is a 52 year old female with history of papillary thyroid carcinoma with follicular variant who is here for follow-up of the same.      She was diagnosed with papillary thyroid cancer in 2012 and underwent total thyroidectomy on 8/8/2012.  The full pathology report is documented below.  Pathology was well-differentiated invasive papillary carcinoma, follicular variant with a 2.1 cm on the left side and 0.8 cm on the right side.  There was lymphovascular invasion bilaterally.  \"A.  Thyroid, left lobe and isthmus, thyroidectomy:       -  Well-differentiated invasive papillary carcinoma, follicular  variant.       -  Tumor size:  2.1 cm.       -  Tumor is confined to the thyroid and extends to 0.2 cm to the  closest resection margin.       -  Lymphovascular invasion .       -  Two hyperplastic nodules, 0.4 and 0.3 cm in diameter.    B.  Thyroid, right lobe, thyroidectomy:       -  Well-differentiated invasive papillary thyroid carcinoma,  follicular variant.       -  Tumor size:  0.8 cm.       -  Tumor is confined to the thyroid and is 0.1 cm from the nearest  surgical margin.       -  No evidence of lymphovascular invasion.       -  Hyperplastic nodule, 0.2 cm in diameter.  C.  Lymph node and parathyroid, left neck, excision:       -  No evidence of malignancy in three lymph nodes (0/3).       -  Benign parathyroid tissue.  D.  Lymph node, right neck, excision:       -  No evidence of malignancy in one lymph node (0/1).\"    She underwent radioiodine ablation with 131 mCi of I-131 on 1/24/2013.  Post scan showed 2 foci of uptake in the anterior neck which was consistent with residual thyroid tissue.  There was no significant uptake in other parts of the body.    Over the years she has had a thyroglobulin level which was undetectable.  Neck ultrasound " also did not show any worrisome lymphadenopathy.    She reports that she is currently on levothyroxine 200 mcg daily.  Reports compliance with medication.  Denies hyper or hypothyroid symptoms including palpitation, GI symptoms, tremor, weight loss or hair/ skin changes.    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  She is currently on calcium 1200 mg twice daily and also calcitriol 0.25 mg twice daily.  Her last calcium labs indicated calcium was normal.     Recently she was diagnosed with diverticulosis.    She also takes vitamin D supplement 1000 international unit(s) daily.  Denies any numbness or tingling.  Denies any muscle cramps.    Reports that she is physically active at home.  Working on life style changes for weight.      No Known Allergies    Current Outpatient Medications   Medication Sig Dispense Refill     albuterol (PROAIR HFA/PROVENTIL HFA/VENTOLIN HFA) 108 (90 Base) MCG/ACT inhaler Inhale 2 puffs into the lungs every 4 hours as needed for shortness of breath / dyspnea 1 Inhaler 6     calcitRIOL (ROCALTROL) 0.25 MCG capsule Take 1 capsule (0.25 mcg) by mouth 2 times daily 180 capsule 3     calcium 600 MG tablet Take 2 tablets (1,200 mg) by mouth 2 times daily 360 tablet 3     Fluticasone Propionate (FLONASE NA) Spray 1 spray in nostril daily       fluticasone-salmeterol (ADVAIR) 250-50 MCG/ACT inhaler INHALE ONE PUFF BY MOUTH TWICE DAILY 180 each 0     ibuprofen (ADVIL/MOTRIN) 200 MG tablet Take 200 mg by mouth every 4 hours as needed for mild pain       levonorgestrel (MIRENA (52 MG)) 20 MCG/24HR IUD 1 each by Intrauterine route once        levothyroxine (SYNTHROID/LEVOTHROID) 200 MCG tablet Take 1 tablet (200 mcg) by mouth daily 90 tablet 1     loratadine (CLARITIN) 10 MG tablet Take 10 mg by mouth daily       montelukast (SINGULAIR) 10 MG tablet Take 1 tablet (10 mg) by mouth daily 90 tablet 0     omeprazole (PRILOSEC) 10 MG DR capsule Take 20 mg by  mouth every other day       Probiotic Product (PROBIOTIC DAILY PO)        Vitamin D, Cholecalciferol, 1000 units CAPS        benzonatate (TESSALON) 100 MG capsule Take 1 capsule (100 mg) by mouth 3 times daily as needed for cough 30 capsule 0       Review of Systems     as per HPI above    Answers for HPI/ROS submitted by the patient on 7/22/2022  General Symptoms: No  Skin Symptoms: No  HENT Symptoms: No  EYE SYMPTOMS: Yes  HEART SYMPTOMS: Yes  LUNG SYMPTOMS: Yes  INTESTINAL SYMPTOMS: Yes  URINARY SYMPTOMS: No  GYNECOLOGIC SYMPTOMS: No  BREAST SYMPTOMS: No  SKELETAL SYMPTOMS: No  BLOOD SYMPTOMS: No  NERVOUS SYSTEM SYMPTOMS: No  MENTAL HEALTH SYMPTOMS: No  Eye pain: No  Vision loss: No  Dry eyes: No  Watery eyes: No  Eye bulging: No  Double vision: No  Flashing of lights: No  Floaters: Yes  Redness: No  Crossed eyes: No  Tunnel Vision: No  Yellowing of eyes: No  Eye irritation: No  Chest pain or pressure: No  Fast or irregular heartbeat: No  Pain in legs with walking: No  Trouble breathing while lying down: No  Fingers or toes appear blue: No  High blood pressure: No  Low blood pressure: No  Fainting: No  Murmurs: No  Pacemaker: No  Varicose veins: No  Edema or swelling: Yes  Wake up at night with shortness of breath: No  Light-headedness: Yes - positional and occasional   Exercise intolerance: No  Cough: Yes - asthma   Sputum or phlegm: Yes - asthma   Coughing up blood: No  Difficulty breating or shortness of breath: No  Snoring: Yes  Wheezing: No  Difficulty breathing on exertion: No  Nighttime Cough: No  Difficulty breathing when lying flat: No  Heart burn or indigestion: Yes  Nausea: No  Vomiting: No  Abdominal pain: No  Bloating: No  Constipation: No  Diarrhea: Yes  Blood in stool: No  Black stools: No  Rectal or Anal pain: No  Fecal incontinence: No  Yellowing of skin or eyes: No  Vomit with blood: No  Change in stools: No        Objective:   There were no vitals taken for this visit.     Constitutional:  Pleasant no acute cardiopulmonary distress.   EYES: anicteric, normal extra-ocular movements, no lid lag or retraction.  Neurological: Alert and oriented. Psychological: appropriate mood and affect     In House Labs:   Lab Results   Component Value Date    A1C 5.3 11/05/2014       TSH   Date Value Ref Range Status   06/28/2021 0.19 (L) 0.40 - 4.00 mU/L Final   10/15/2020 0.44 0.40 - 4.00 mU/L Final   12/23/2019 0.30 (L) 0.40 - 4.00 mU/L Final   06/24/2019 0.66 0.40 - 4.00 mU/L Final   01/04/2019 0.72 0.40 - 4.00 mU/L Final     T4 Free   Date Value Ref Range Status   06/28/2021 1.38 0.76 - 1.46 ng/dL Final   12/23/2019 1.48 (H) 0.76 - 1.46 ng/dL Final   06/24/2019 1.53 (H) 0.76 - 1.46 ng/dL Final   01/04/2019 1.32 0.76 - 1.46 ng/dL Final   01/15/2018 1.48 (H) 0.76 - 1.46 ng/dL Final       Creatinine   Date Value Ref Range Status   06/28/2021 1.00 0.52 - 1.04 mg/dL Final   ]  EXAMINATION: US HEAD NECK SOFT TISSUE, 6/28/2021 12:12 PM      COMPARISON: 6/24/2019     HISTORY:  Papillary thyroid carcinoma (H)     FINDINGS:  Lymph nodes throughout the neck demonstrate normal morphology. Lymph  nodes are measured bilaterally, at Levels 2 to 7, with measurements as  follows:     Right:  Level 2: 1.1 x 0.8 x 0.6 cm  Level 3: 0.7 x 0.5 x 0.3 cm, 0.6 x 0.4 x 0.3 cm  Level 4: 1.1 x 0.8 x 0.3 cm, 0.7 x 0.6 x 0.2 cm, 1.1 x 0.8 x 0.6 cm  Level 5: 0.9 x 0.7 x 0.4 cm, 1.0 x 0.8 x 0.5 cm  Level 6: None  Level 7: None     Left:  Level 2: 1.1 x 0.9 x 0.7 cm, 0.7 x 0.6 x 0.3 cm  Level 3: 1.0 x 0.7 x 0.4 cm, 0.8 x 0.5 x 0.2 cm  Level 4: 0.6 x 0.6 x 0.3 cm, 1.4 x 1.0 x 0.6 cm, 0.7 x 0.6 x 0.4 cm  Level 5: 0.6 x 0.6 x 0.5 cm  Level 6: None  Level 7: None                                                                      IMPRESSION:  1.  Soft tissue neck ultrasound with lymph node measurements as  described above. No abnormal lymphadenopathy.    Assessment/Treatment Plan:      History of papillary thyroid carcinoma; follicular variant:  diagnosed with papillary thyroid cancer in 2012 and underwent total thyroidectomy on 8/8/2012.  Pathology was well-differentiated invasive papillary carcinoma, follicular variant with a 2.1 cm on the left side and 0.8 cm on the right side.  There was lymphovascular invasion bilaterally. She underwent radioiodine ablation with 131 mCi of I-131 on 1/24/2013.  Post scan showed 2 foci of uptake in the anterior neck which was consistent with residual thyroid tissue.  There was no significant uptake in other parts of the body.  Thyroglobulin level has been undetectable after surgery.  Unremarkable neck ultrasound finding-last done 6/2019.    Check TSH - TSH suppressed. Levothyroxine 200 mcg daily.     Check thyroglobulin level    No evidence of biochemical or structural recurrence therefore TSH goal around 0.4 will adjust levothyroxine dose for this.       Hypocalcemia secondary to hypoparathyroidism: She is currently on calcium 1200 mg twice daily and also calcitriol 0.25 mg twice daily.  We will check to follow-up calcium level which was stable today.  Continue current calcitriol and calcium supplement. Adjust calcitriol dose.   Return to clinic in 12 months.    Test and/or medications prescribed today:  Orders Placed This Encounter   Procedures     TSH with free T4 reflex     Thyroglobulin and Antibody (Sendout to [facility])     Hemoglobin A1c     Albumin level     Calcium     Vitamin D Deficiency (D3 Only)     Parathyroid Hormone Intact       Owen Handley MD  Staff Endocrinologist    Division of Endocrinology and Diabetes    Video= 28 minutes.   Nba. 48 minutes spent on the date of the encounter doing chart review, history, documentation and further activities per the note.

## 2022-08-02 NOTE — LETTER
"    8/2/2022         RE: Kami Natarajan  [address] [facility]  [place] [address]        Dear Colleague,    Thank you for referring your patient, Kami Natarajan, to the [facility]. Please see a copy of my visit note below.    Kami is a 52 year old who is being evaluated via a billable video visit.    Patient is   How would you like to obtain your AVS? MyChart  If the video visit is dropped, the invitation should be resent by: Send to e-mail at: [email or web address]  Will anyone else be joining your video visit? No            Endocrinology Clinic Visit    Chief Complaint: RECHECK (1 year follow up/follow up thyroid)     Information obtained from:Patient    Subjective:         HPI: Kami Natarajan is a 52 year old female with history of papillary thyroid carcinoma with follicular variant who is here for follow-up of the same.      She was diagnosed with papillary thyroid cancer in 2012 and underwent total thyroidectomy on 8/8/2012.  The full pathology report is documented below.  Pathology was well-differentiated invasive papillary carcinoma, follicular variant with a 2.1 cm on the left side and 0.8 cm on the right side.  There was lymphovascular invasion bilaterally.  \"A.  Thyroid, left lobe and isthmus, thyroidectomy:       -  Well-differentiated invasive papillary carcinoma, follicular  variant.       -  Tumor size:  2.1 cm.       -  Tumor is confined to the thyroid and extends to 0.2 cm to the  closest resection margin.       -  Lymphovascular invasion .       -  Two hyperplastic nodules, 0.4 and 0.3 cm in diameter.    B.  Thyroid, right lobe, thyroidectomy:       -  Well-differentiated invasive papillary thyroid carcinoma,  follicular variant.       -  Tumor size:  0.8 cm.       -  Tumor is confined to the thyroid and is 0.1 cm from the nearest  surgical margin.       -  No evidence of lymphovascular invasion.       -  Hyperplastic nodule, 0.2 cm in diameter.  C.  Lymph node and parathyroid, " "left neck, excision:       -  No evidence of malignancy in three lymph nodes (0/3).       -  Benign parathyroid tissue.  D.  Lymph node, right neck, excision:       -  No evidence of malignancy in one lymph node (0/1).\"    She underwent radioiodine ablation with 131 mCi of I-131 on 1/24/2013.  Post scan showed 2 foci of uptake in the anterior neck which was consistent with residual thyroid tissue.  There was no significant uptake in other parts of the body.    Over the years she has had a thyroglobulin level which was undetectable.  Neck ultrasound also did not show any worrisome lymphadenopathy.    She reports that she is currently on levothyroxine 200 mcg daily.  Reports compliance with medication.  Denies hyper or hypothyroid symptoms including palpitation, GI symptoms, tremor, weight loss or hair/ skin changes.    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  She is currently on calcium 1200 mg twice daily and also calcitriol 0.25 mg twice daily.  Her last calcium labs indicated calcium was normal.     Recently she was diagnosed with diverticulosis.    She also takes vitamin D supplement 1000 international unit(s) daily.  Denies any numbness or tingling.  Denies any muscle cramps.    Reports that she is physically active at home.  Working on life style changes for weight.      No Known Allergies    Current Outpatient Medications   Medication Sig Dispense Refill     albuterol (PROAIR HFA/PROVENTIL HFA/VENTOLIN HFA) 108 (90 Base) MCG/ACT inhaler Inhale 2 puffs into the lungs every 4 hours as needed for shortness of breath / dyspnea 1 Inhaler 6     calcitRIOL (ROCALTROL) 0.25 MCG capsule Take 1 capsule (0.25 mcg) by mouth 2 times daily 180 capsule 3     calcium 600 MG tablet Take 2 tablets (1,200 mg) by mouth 2 times daily 360 tablet 3     Fluticasone Propionate (FLONASE NA) Spray 1 spray in nostril daily       fluticasone-salmeterol (ADVAIR) 250-50 MCG/ACT inhaler INHALE " ONE PUFF BY MOUTH TWICE DAILY 180 each 0     ibuprofen (ADVIL/MOTRIN) 200 MG tablet Take 200 mg by mouth every 4 hours as needed for mild pain       levonorgestrel (MIRENA (52 MG)) 20 MCG/24HR IUD 1 each by Intrauterine route once        levothyroxine (SYNTHROID/LEVOTHROID) 200 MCG tablet Take 1 tablet (200 mcg) by mouth daily 90 tablet 1     loratadine (CLARITIN) 10 MG tablet Take 10 mg by mouth daily       montelukast (SINGULAIR) 10 MG tablet Take 1 tablet (10 mg) by mouth daily 90 tablet 0     omeprazole (PRILOSEC) 10 MG DR capsule Take 20 mg by mouth every other day       Probiotic Product (PROBIOTIC DAILY PO)        Vitamin D, Cholecalciferol, 1000 units CAPS        benzonatate (TESSALON) 100 MG capsule Take 1 capsule (100 mg) by mouth 3 times daily as needed for cough 30 capsule 0       Review of Systems     as per HPI above    Answers for HPI/ROS submitted by the patient on 7/22/2022  General Symptoms: No  Skin Symptoms: No  HENT Symptoms: No  EYE SYMPTOMS: Yes  HEART SYMPTOMS: Yes  LUNG SYMPTOMS: Yes  INTESTINAL SYMPTOMS: Yes  URINARY SYMPTOMS: No  GYNECOLOGIC SYMPTOMS: No  BREAST SYMPTOMS: No  SKELETAL SYMPTOMS: No  BLOOD SYMPTOMS: No  NERVOUS SYSTEM SYMPTOMS: No  MENTAL HEALTH SYMPTOMS: No  Eye pain: No  Vision loss: No  Dry eyes: No  Watery eyes: No  Eye bulging: No  Double vision: No  Flashing of lights: No  Floaters: Yes  Redness: No  Crossed eyes: No  Tunnel Vision: No  Yellowing of eyes: No  Eye irritation: No  Chest pain or pressure: No  Fast or irregular heartbeat: No  Pain in legs with walking: No  Trouble breathing while lying down: No  Fingers or toes appear blue: No  High blood pressure: No  Low blood pressure: No  Fainting: No  Murmurs: No  Pacemaker: No  Varicose veins: No  Edema or swelling: Yes  Wake up at night with shortness of breath: No  Light-headedness: Yes - positional and occasional   Exercise intolerance: No  Cough: Yes - asthma   Sputum or phlegm: Yes - asthma   Coughing up blood:  No  Difficulty breating or shortness of breath: No  Snoring: Yes  Wheezing: No  Difficulty breathing on exertion: No  Nighttime Cough: No  Difficulty breathing when lying flat: No  Heart burn or indigestion: Yes  Nausea: No  Vomiting: No  Abdominal pain: No  Bloating: No  Constipation: No  Diarrhea: Yes  Blood in stool: No  Black stools: No  Rectal or Anal pain: No  Fecal incontinence: No  Yellowing of skin or eyes: No  Vomit with blood: No  Change in stools: No        Objective:   There were no vitals taken for this visit.     Constitutional: Pleasant no acute cardiopulmonary distress.   EYES: anicteric, normal extra-ocular movements, no lid lag or retraction.  Neurological: Alert and oriented. Psychological: appropriate mood and affect     In House Labs:   Lab Results   Component Value Date    A1C 5.3 11/05/2014       TSH   Date Value Ref Range Status   06/28/2021 0.19 (L) 0.40 - 4.00 mU/L Final   10/15/2020 0.44 0.40 - 4.00 mU/L Final   12/23/2019 0.30 (L) 0.40 - 4.00 mU/L Final   06/24/2019 0.66 0.40 - 4.00 mU/L Final   01/04/2019 0.72 0.40 - 4.00 mU/L Final     T4 Free   Date Value Ref Range Status   06/28/2021 1.38 0.76 - 1.46 ng/dL Final   12/23/2019 1.48 (H) 0.76 - 1.46 ng/dL Final   06/24/2019 1.53 (H) 0.76 - 1.46 ng/dL Final   01/04/2019 1.32 0.76 - 1.46 ng/dL Final   01/15/2018 1.48 (H) 0.76 - 1.46 ng/dL Final       Creatinine   Date Value Ref Range Status   06/28/2021 1.00 0.52 - 1.04 mg/dL Final   ]  EXAMINATION: US HEAD NECK SOFT TISSUE, 6/28/2021 12:12 PM      COMPARISON: 6/24/2019     HISTORY:  Papillary thyroid carcinoma (H)     FINDINGS:  Lymph nodes throughout the neck demonstrate normal morphology. Lymph  nodes are measured bilaterally, at Levels 2 to 7, with measurements as  follows:     Right:  Level 2: 1.1 x 0.8 x 0.6 cm  Level 3: 0.7 x 0.5 x 0.3 cm, 0.6 x 0.4 x 0.3 cm  Level 4: 1.1 x 0.8 x 0.3 cm, 0.7 x 0.6 x 0.2 cm, 1.1 x 0.8 x 0.6 cm  Level 5: 0.9 x 0.7 x 0.4 cm, 1.0 x 0.8 x 0.5 cm  Level  6: None  Level 7: None     Left:  Level 2: 1.1 x 0.9 x 0.7 cm, 0.7 x 0.6 x 0.3 cm  Level 3: 1.0 x 0.7 x 0.4 cm, 0.8 x 0.5 x 0.2 cm  Level 4: 0.6 x 0.6 x 0.3 cm, 1.4 x 1.0 x 0.6 cm, 0.7 x 0.6 x 0.4 cm  Level 5: 0.6 x 0.6 x 0.5 cm  Level 6: None  Level 7: None                                                                      IMPRESSION:  1.  Soft tissue neck ultrasound with lymph node measurements as  described above. No abnormal lymphadenopathy.    Assessment/Treatment Plan:      History of papillary thyroid carcinoma; follicular variant: diagnosed with papillary thyroid cancer in 2012 and underwent total thyroidectomy on 8/8/2012.  Pathology was well-differentiated invasive papillary carcinoma, follicular variant with a 2.1 cm on the left side and 0.8 cm on the right side.  There was lymphovascular invasion bilaterally. She underwent radioiodine ablation with 131 mCi of I-131 on 1/24/2013.  Post scan showed 2 foci of uptake in the anterior neck which was consistent with residual thyroid tissue.  There was no significant uptake in other parts of the body.  Thyroglobulin level has been undetectable after surgery.  Unremarkable neck ultrasound finding-last done 6/2019.    Check TSH - TSH suppressed. Levothyroxine 200 mcg daily.     Check thyroglobulin level    No evidence of biochemical or structural recurrence therefore TSH goal around 0.4 will adjust levothyroxine dose for this.       Hypocalcemia secondary to hypoparathyroidism: She is currently on calcium 1200 mg twice daily and also calcitriol 0.25 mg twice daily.  We will check to follow-up calcium level which was stable today.  Continue current calcitriol and calcium supplement. Adjust calcitriol dose.   Return to clinic in 12 months.    Test and/or medications prescribed today:  Orders Placed This Encounter   Procedures     TSH with free T4 reflex     Thyroglobulin and Antibody (Sendout to [facility])     Hemoglobin A1c     Albumin level     Calcium     Vitamin D  Deficiency (D3 Only)     Parathyroid Hormone Intact       Owen Handley MD  Staff Endocrinologist    Division of Endocrinology and Diabetes    Video= 28 minutes.   AMwell. 48 minutes spent on the date of the encounter doing chart review, history, documentation and further activities per the note.          Again, thank you for allowing me to participate in the care of your patient.        Sincerely,        Owen Handley MD

## 2022-08-02 NOTE — PATIENT INSTRUCTIONS
Kami,    Jesus Placed This Encounter   Procedures    TSH with free T4 reflex    Thyroglobulin and Antibody (Sendout to [facility])    Hemoglobin A1c    Albumin level    Calcium    Vitamin D Deficiency (D3 Only)    Parathyroid Hormone Intact     Lab scheduling to schedule at any [place] lab locations: [phone] select option 1    Levothyroxine should be taken on empty stomach and wait at least 30 minutes before eating. Don't take supplements or multivitamins containing iron and calcium within 4 hours of taking levothyroxine tablet.       We will contact you after the results.

## 2022-08-04 ENCOUNTER — LAB (OUTPATIENT)
Dept: LAB | Facility: CLINIC | Age: 52
End: 2022-08-04
Payer: COMMERCIAL

## 2022-08-04 DIAGNOSIS — E83.51 HYPOCALCEMIA: ICD-10-CM

## 2022-08-04 DIAGNOSIS — E66.09 CLASS 2 OBESITY DUE TO EXCESS CALORIES WITHOUT SERIOUS COMORBIDITY WITH BODY MASS INDEX (BMI) OF 39.0 TO 39.9 IN ADULT: ICD-10-CM

## 2022-08-04 DIAGNOSIS — E89.0 POSTOPERATIVE HYPOTHYROIDISM: ICD-10-CM

## 2022-08-04 DIAGNOSIS — E20.89 OTHER HYPOPARATHYROIDISM (H): ICD-10-CM

## 2022-08-04 DIAGNOSIS — C73 PAPILLARY THYROID CARCINOMA (H): ICD-10-CM

## 2022-08-04 DIAGNOSIS — E66.812 CLASS 2 OBESITY DUE TO EXCESS CALORIES WITHOUT SERIOUS COMORBIDITY WITH BODY MASS INDEX (BMI) OF 39.0 TO 39.9 IN ADULT: ICD-10-CM

## 2022-08-04 LAB
ALBUMIN SERPL-MCNC: 4 G/DL (ref 3.4–5)
CALCIUM SERPL-MCNC: 8.7 MG/DL (ref 8.5–10.1)
HBA1C MFR BLD: 5.8 % (ref 0–5.6)
PTH-INTACT SERPL-MCNC: 23 PG/ML (ref 15–65)
T4 FREE SERPL-MCNC: 1.69 NG/DL (ref 0.76–1.46)
TSH SERPL DL<=0.005 MIU/L-ACNC: 0.22 MU/L (ref 0.4–4)

## 2022-08-04 PROCEDURE — 82310 ASSAY OF CALCIUM: CPT

## 2022-08-04 PROCEDURE — 83036 HEMOGLOBIN GLYCOSYLATED A1C: CPT

## 2022-08-04 PROCEDURE — 83970 ASSAY OF PARATHORMONE: CPT

## 2022-08-04 PROCEDURE — 36415 COLL VENOUS BLD VENIPUNCTURE: CPT

## 2022-08-04 PROCEDURE — 82040 ASSAY OF SERUM ALBUMIN: CPT

## 2022-08-04 PROCEDURE — 84432 ASSAY OF THYROGLOBULIN: CPT | Mod: 90

## 2022-08-04 PROCEDURE — 84439 ASSAY OF FREE THYROXINE: CPT

## 2022-08-04 PROCEDURE — 86800 THYROGLOBULIN ANTIBODY: CPT | Mod: 90

## 2022-08-04 PROCEDURE — 82306 VITAMIN D 25 HYDROXY: CPT

## 2022-08-04 PROCEDURE — 84443 ASSAY THYROID STIM HORMONE: CPT

## 2022-08-05 LAB — DEPRECATED CALCIDIOL+CALCIFEROL SERPL-MC: 45 UG/L (ref 20–75)

## 2022-08-08 DIAGNOSIS — E89.0 POSTOPERATIVE HYPOTHYROIDISM: ICD-10-CM

## 2022-08-08 DIAGNOSIS — E89.0 S/P COMPLETE THYROIDECTOMY: ICD-10-CM

## 2022-08-08 DIAGNOSIS — E83.51 HYPOCALCEMIA: ICD-10-CM

## 2022-08-08 RX ORDER — LEVOTHYROXINE SODIUM 200 UG/1
200 TABLET ORAL DAILY
Qty: 90 TABLET | Refills: 1 | Status: SHIPPED | OUTPATIENT
Start: 2022-08-08 | End: 2023-03-09

## 2022-08-08 RX ORDER — CALCITRIOL 0.25 UG/1
0.25 CAPSULE, LIQUID FILLED ORAL DAILY
Qty: 90 CAPSULE | Refills: 3 | Status: SHIPPED | OUTPATIENT
Start: 2022-08-08 | End: 2023-08-08

## 2022-08-08 NOTE — RESULT ENCOUNTER NOTE
PLEASE CALL; PREFER CALL WITH RESULTS. DIDN'T SEE [email or web address] MESSAGE SENT WITH RESULTS PREVIOUSLY.     #1 thyroid blood work suggested that we need to reduce the dose of thyroid medication.  Please take his thyroid medication to levothyroxine 200 MCG 6 days a week and take half tablet [100 MCG] 1 day a week.  Please check follow-up thyroid blood work in 3 months.  #2 based on the calcium, parathyroid results -I recommend reducing the calcitriol to 0.25 MCG once daily [previously twice daily].  3.  Continue calcium at the same dose  #4 A1c indicates prediabetes-weight loss, regular exercise and healthy eating habits are the first steps to treat this condition.  Medication refills sent to pharmacy.     Please let us know if any questions.    Owen Handley MD

## 2022-08-10 ENCOUNTER — TELEPHONE (OUTPATIENT)
Dept: ENDOCRINOLOGY | Facility: CLINIC | Age: 52
End: 2022-08-10

## 2022-08-10 DIAGNOSIS — E83.51 HYPOCALCEMIA: Primary | ICD-10-CM

## 2022-08-10 DIAGNOSIS — E89.0 POSTOPERATIVE HYPOTHYROIDISM: ICD-10-CM

## 2022-08-10 NOTE — TELEPHONE ENCOUNTER
Patient advised of results and recommendations. Patient verbalizes understanding and agrees to plan. Patient will decrease down to levothyroxine 200 mcg 6 days weekly and 100 mcg once weekly. Patient will also decrease her Calcitrol to 0.25 mcg once daily. Patient will plan on labs in 3 months as recommended by Dr. Handley. Patient will schedule lab appointment in future.     Writer will send message to Dr. Handley to confirm lab orders needed.       Johanna Rodríguez RN  Endocrine Care Coordinator  [facility]

## 2022-08-10 NOTE — TELEPHONE ENCOUNTER
----- Message from Abida Rodríguez RN sent at 8/10/2022  3:15 PM CDT -----    ----- Message -----  From: Owen Handley MD  Sent: 8/8/2022  12:35 PM CDT  To: Abida Rodríguez, RN    PLEASE CALL; PREFER CALL WITH RESULTS. DIDN'T SEE [employer] MESSAGE SENT WITH RESULTS PREVIOUSLY.     #1 thyroid blood work suggested that we need to reduce the dose of thyroid medication.  Please take his thyroid medication to levothyroxine 200 MCG 6 days a week and take half tablet [100 MCG] 1 day a week.  Please check follow-up thyroid blood work in 3 months.  #2 based on the calcium, parathyroid results -I recommend reducing the calcitriol to 0.25 MCG once daily [previously twice daily].  3.  Continue calcium at the same dose  #4 A1c indicates prediabetes-weight loss, regular exercise and healthy eating habits are the first steps to treat this condition.  Medication refills sent to pharmacy.     Please let us know if any questions.    Owen Handley MD

## 2022-08-10 NOTE — TELEPHONE ENCOUNTER
Orders Placed This Encounter   Procedures     TSH with free T4 reflex     Albumin level     Calcium   Owen Handley MD

## 2022-08-12 LAB — SCANNED LAB RESULT: NORMAL

## 2022-08-17 ENCOUNTER — TELEPHONE (OUTPATIENT)
Dept: ENDOCRINOLOGY | Facility: CLINIC | Age: 52
End: 2022-08-17

## 2022-08-17 NOTE — TELEPHONE ENCOUNTER
Writer attempted to contact patient. She is currently at the eye doctor and will call back to clinic to review further. Will await patient call back.      Johanna Rodríguez RN  Endocrine Care Coordinator  [facility]

## 2022-08-17 NOTE — TELEPHONE ENCOUNTER
[facility] Call Center    Phone Message    May a detailed message be left on voicemail: yes     Reason for Call: Medication Question or concern regarding medication   Prescription Clarification    Name of Medication:    * semaglutide (OZEMPIC) 2 MG/1.5ML SOPN pen      Prescribing Provider: Ender     Pharmacy: [id number] - [place], [place] - [id number]     What on the order needs clarification? Per Patient is wanting to get a call back. Patient states the clinic was going to reach out to patient to provide instructions on how to use the medication. Patient is wanting to get a call back to further discuss. Please advise.     Action Taken: Message routed to:  [facility] Endo    Travel Screening: Not Applicable

## 2022-08-18 NOTE — TELEPHONE ENCOUNTER
Writer returned patient's calls. Patient reports that her insurance did cover the Ozempic and she does have it now. Patient notes that her daughter is a diabetic and her other daugther is a nurse. Patient does not feel she will need to come in for patient teaching. Writer did send patient MyChart with Ozempic web-site for reference which includes a tutorial video on how to give Ozempic. Writer did ask that patient call or MyChart with any questions or concerns. Writer did talk about possible side effects and priming each new pen. Patient notes that she has a boat cruise on Saturday and then a bus trip next week. Patient is going to hold off on starting Ozempic until she gets back to be safe, just in case she has side effects at first.         Johanna Rodríguez RN  Endocrine Care Coordinator  [facility]

## 2022-09-03 ENCOUNTER — HEALTH MAINTENANCE LETTER (OUTPATIENT)
Age: 52
End: 2022-09-03

## 2022-10-05 ENCOUNTER — TELEPHONE (OUTPATIENT)
Dept: ENDOCRINOLOGY | Facility: CLINIC | Age: 52
End: 2022-10-05

## 2022-10-05 NOTE — TELEPHONE ENCOUNTER
[facility] Call Center    Phone Message    May a detailed message be left on voicemail: yes     Reason for Call: Other: Patient called in to see if she needs to just do Lab work, or if she needs to see the provider soon regarding her med changes and to see how she is doing on them.  Does she need to be seen end of December early January.  Please advise.     Action Taken: Other: Endo    Travel Screening: Not Applicable

## 2022-10-05 NOTE — TELEPHONE ENCOUNTER
Left message for patient that labs are all that's needed at this time.     Sara Park on 10/5/2022 at 12:48 PM

## 2022-10-20 DIAGNOSIS — J45.30 MILD PERSISTENT ASTHMA WITHOUT COMPLICATION: Chronic | ICD-10-CM

## 2022-10-21 ENCOUNTER — OFFICE VISIT (OUTPATIENT)
Dept: FAMILY MEDICINE | Facility: CLINIC | Age: 52
End: 2022-10-21
Payer: COMMERCIAL

## 2022-10-21 VITALS
RESPIRATION RATE: 18 BRPM | HEIGHT: 66 IN | WEIGHT: 232 LBS | BODY MASS INDEX: 37.28 KG/M2 | OXYGEN SATURATION: 97 % | DIASTOLIC BLOOD PRESSURE: 82 MMHG | HEART RATE: 84 BPM | TEMPERATURE: 97.2 F | SYSTOLIC BLOOD PRESSURE: 126 MMHG

## 2022-10-21 DIAGNOSIS — M79.645 BILATERAL THUMB PAIN: ICD-10-CM

## 2022-10-21 DIAGNOSIS — Z00.00 ROUTINE GENERAL MEDICAL EXAMINATION AT A HEALTH CARE FACILITY: Primary | ICD-10-CM

## 2022-10-21 DIAGNOSIS — J45.30 MILD PERSISTENT ASTHMA WITHOUT COMPLICATION: Chronic | ICD-10-CM

## 2022-10-21 DIAGNOSIS — Z11.59 NEED FOR HEPATITIS C SCREENING TEST: ICD-10-CM

## 2022-10-21 DIAGNOSIS — M79.644 BILATERAL THUMB PAIN: ICD-10-CM

## 2022-10-21 DIAGNOSIS — R23.2 HOT FLASHES: ICD-10-CM

## 2022-10-21 PROCEDURE — 99396 PREV VISIT EST AGE 40-64: CPT | Performed by: FAMILY MEDICINE

## 2022-10-21 PROCEDURE — 99213 OFFICE O/P EST LOW 20 MIN: CPT | Mod: 25 | Performed by: FAMILY MEDICINE

## 2022-10-21 RX ORDER — FLUTICASONE PROPIONATE AND SALMETEROL 250; 50 UG/1; UG/1
POWDER RESPIRATORY (INHALATION)
Qty: 180 EACH | Refills: 3 | Status: SHIPPED | OUTPATIENT
Start: 2022-10-21

## 2022-10-21 RX ORDER — MONTELUKAST SODIUM 10 MG/1
1 TABLET ORAL DAILY
Qty: 90 TABLET | Refills: 3 | Status: SHIPPED | OUTPATIENT
Start: 2022-10-21 | End: 2024-01-15

## 2022-10-21 ASSESSMENT — ENCOUNTER SYMPTOMS
PALPITATIONS: 0
CONSTIPATION: 0
DIZZINESS: 0
DYSURIA: 0
BREAST MASS: 0
ARTHRALGIAS: 0
HEARTBURN: 0
JOINT SWELLING: 0
HEMATOCHEZIA: 0
MYALGIAS: 0
DIARRHEA: 0
EYE PAIN: 0
COUGH: 0
WEAKNESS: 0
SHORTNESS OF BREATH: 0
NAUSEA: 0
SORE THROAT: 0
NERVOUS/ANXIOUS: 0
HEADACHES: 1
HEMATURIA: 0
ABDOMINAL PAIN: 0
FREQUENCY: 0
CHILLS: 0
PARESTHESIAS: 0
FEVER: 0

## 2022-10-21 ASSESSMENT — PAIN SCALES - GENERAL: PAINLEVEL: NO PAIN (0)

## 2022-10-21 NOTE — LETTER
My Asthma Action Plan    Name: Kami Natarajan   YOB: 1970  Date: 10/21/2022   My doctor: Fabiana Ramos MD   My clinic: [facility]        My Control Medicine: Fluticasone propionate + salmeterol (Advair Diskus or Wixela Inhub) -  250/50 mcg    My Rescue Medicine: Albuterol (Proair/Ventolin/Proventil HFA) 2-4 puffs EVERY 4 HOURS as needed. Use a spacer if recommended by your provider.   My Asthma Severity:   Moderate Persistent  Know your asthma triggers: pollens  Allergies            GREEN ZONE   Good Control    I feel good    No cough or wheeze    Can work, sleep and play without asthma symptoms       Take your asthma control medicine every day.     1. If exercise triggers your asthma, take your rescue medication    15 minutes before exercise or sports, and    During exercise if you have asthma symptoms  2. Spacer to use with inhaler: If you have a spacer, make sure to use it with your inhaler             YELLOW ZONE Getting Worse  I have ANY of these:    I do not feel good    Cough or wheeze    Chest feels tight    Wake up at night   1. Keep taking your Green Zone medications  2. Start taking your rescue medicine:    every 20 minutes for up to 1 hour. Then every 4 hours for 24-48 hours.  3. If you stay in the Yellow Zone for more than 12-24 hours, contact your doctor.  4. If you do not return to the Green Zone in 12-24 hours or you get worse, start taking your oral steroid medicine if prescribed by your provider.           RED ZONE Medical Alert - Get Help  I have ANY of these:    I feel awful    Medicine is not helping    Breathing getting harder    Trouble walking or talking    Nose opens wide to breathe       1. Take your rescue medicine NOW  2. If your provider has prescribed an oral steroid medicine, start taking it NOW  3. Call your doctor NOW  4. If you are still in the Red Zone after 20 minutes and you have not reached your doctor:    Take your rescue medicine again  and    Call 911 or go to the emergency room right away    See your regular doctor within 2 weeks of an Emergency Room or Urgent Care visit for follow-up treatment.          Annual Reminders:  Meet with Asthma Educator,  Flu Shot in the Fall, consider Pneumonia Vaccination for patients with asthma (aged 19 and older).    Pharmacy: [id number] - [place], [place] - [address]    Electronically signed by Fabiana Ramos MD   Date: 10/21/22                      Asthma Triggers  How To Control Things That Make Your Asthma Worse    Triggers are things that make your asthma worse.  Look at the list below to help you find your triggers and what you can do about them.  You can help prevent asthma flare-ups by staying away from your triggers.      Trigger                                                          What you can do   Cigarette Smoke  Tobacco smoke can make asthma worse. Do not allow smoking in your home, car or around you.  Be sure no one smokes at a child s day care or school.  If you smoke, ask your health care provider for ways to help you quit.  Ask family members to quit too.  Ask your health care provider for a referral to Quit Plan to help you quit smoking, or call 3-198-651-PLAN.     Colds, Flu, Bronchitis  These are common triggers of asthma. Wash your hands often.  Don t touch your eyes, nose or mouth.  Get a flu shot every year.     Dust Mites  These are tiny bugs that live in cloth or carpet. They are too small to see. Wash sheets and blankets in hot water every week.   Encase pillows and mattress in dust mite proof covers.  Avoid having carpet if you can. If you have carpet, vacuum weekly.   Use a dust mask and HEPA vacuum.   Pollen and Outdoor Mold  Some people are allergic to trees, grass, or weed pollen, or molds. Try to keep your windows closed.  Limit time out doors when pollen count is high.   Ask you health care provider about taking medicine during allergy season.     Animal  Dander  Some people are allergic to skin flakes, urine or saliva from pets with fur or feathers. Keep pets with fur or feathers out of your home.    If you can t keep the pet outdoors, then keep the pet out of your bedroom.  Keep the bedroom door closed.  Keep pets off cloth furniture and away from stuffed toys.     Mice, Rats, and Cockroaches   Some people are allergic to the waste from these pests.   Cover food and garbage.  Clean up spills and food crumbs.  Store grease in the refrigerator.   Keep food out of the bedroom.   Indoor Mold  This can be a trigger if your home has high moisture. Fix leaking faucets, pipes, or other sources of water.   Clean moldy surfaces.  Dehumidify basement if it is damp and smelly.   Smoke, Strong Odors, and Sprays  These can reduce air quality. Stay away from strong odors and sprays, such as perfume, powder, hair spray, paints, smoke incense, paint, cleaning products, candles and new carpet.   Exercise or Sports  Some people with asthma have this trigger. Be active!  Ask your doctor about taking medicine before sports or exercise to prevent symptoms.    Warm up for 5-10 minutes before and after sports or exercise.     Other Triggers of Asthma  Cold air:  Cover your nose and mouth with a scarf.  Sometimes laughing or crying can be a trigger.  Some medicines and food can trigger asthma.

## 2022-10-21 NOTE — PROGRESS NOTES
SUBJECTIVE:   CC: Kami is an 52 year old who presents for preventive health visit.         Patient has been advised of split billing requirements and indicates understanding: Yes     Healthy Habits:     Getting at least 3 servings of Calcium per day:  Yes    Bi-annual eye exam:  Yes    Dental care twice a year:  Yes    Sleep apnea or symptoms of sleep apnea:  Excessive snoring and Sleep apnea    Diet:  Other    Frequency of exercise:  2-3 days/week    Duration of exercise:  45-60 minutes    Taking medications regularly:  Yes    Medication side effects:  None    PHQ-2 Total Score: 0    Additional concerns today:  Yes      Since last seen started on Ozempic prediabetes and weight management. Appetite has decreased. She feels good on the medication.     Is having some elevation of eye pressure.     Asthma Follow-Up    Was ACT completed today?    Yes    ACT Total Scores 7/22/2022   ACT TOTAL SCORE -   ASTHMA ER VISITS -   ASTHMA HOSPITALIZATIONS -   ACT TOTAL SCORE (Goal Greater than or Equal to 20) 23   In the past 12 months, how many times did you visit the emergency room for your asthma without being admitted to the hospital? 0   In the past 12 months, how many times were you hospitalized overnight because of your asthma? 0          How many days per week do you miss taking your asthma controller medication?  0    Please describe any recent triggers for your asthma: pollens    Have you had any Emergency Room Visits, Urgent Care Visits, or Hospital Admissions since your last office visit?  No      Today's PHQ-2 Score:   PHQ-2 ( 1999 [employer]) 10/21/2022   Q1: Little interest or pleasure in doing things 0   Q2: Feeling down, depressed or hopeless 0   PHQ-2 Score 0   PHQ-2 Total Score (12-17 Years)- Positive if 3 or more points; Administer PHQ-A if positive -   Q1: Little interest or pleasure in doing things Not at all   Q2: Feeling down, depressed or hopeless Not at all   PHQ-2 Score 0       Abuse: Current or Past  (Physical, Sexual or Emotional) - No  Do you feel safe in your environment? Yes    Have you ever done Advance Care Planning? (For example, a Health Directive, POLST, or a discussion with a medical provider or your loved ones about your wishes): No, advance care planning information given to patient to review.  Patient plans to discuss their wishes with loved ones or provider.      Social History     Tobacco Use     Smoking status: Former     Types: Cigarettes     Quit date: 1996     Years since quittin.4     Smokeless tobacco: Never   Substance Use Topics     Alcohol use: Yes     Comment: 0-1 per month          Alcohol Use 10/21/2022   Prescreen: >3 drinks/day or >7 drinks/week? No   Prescreen: >3 drinks/day or >7 drinks/week? -       Reviewed orders with patient.  Reviewed health maintenance and updated orders accordingly - Yes  BP Readings from Last 3 Encounters:   10/21/22 126/82   22 120/80   21 106/76    Wt Readings from Last 3 Encounters:   10/21/22 105.2 kg (232 lb)   22 113 kg (249 lb 3.2 oz)   21 110.2 kg (243 lb)                    Breast Cancer Screening:    Breast CA Risk Assessment (FHS-7) 10/21/2022   Do you have a family history of breast, colon, or ovarian cancer? No / Unknown         Mammogram Screening: Recommended annual mammography  Pertinent mammograms are reviewed under the imaging tab.    History of abnormal Pap smear: NO - age 30-65 PAP every 5 years with negative HPV co-testing recommended  PAP / HPV Latest Ref Rng & Units 10/8/2020 2017 2014   PAP (Historical) - NIL NIL NIL   HPV16 NEG:Negative Negative Negative -   HPV18 NEG:Negative Negative Negative -   HRHPV NEG:Negative Negative Negative -     Reviewed and updated as needed this visit by clinical staff   Tobacco  Allergies  Meds   Med Hx  Surg Hx  Fam Hx  Soc Hx        Reviewed and updated as needed this visit by Provider                     Review of Systems   Constitutional: Negative  "for chills and fever.   HENT: Negative for congestion, ear pain, hearing loss and sore throat.    Eyes: Negative for pain and visual disturbance.   Respiratory: Negative for cough and shortness of breath.    Cardiovascular: Negative for chest pain, palpitations and peripheral edema.   Gastrointestinal: Negative for abdominal pain, constipation, diarrhea, heartburn, hematochezia and nausea.   Breasts:  Negative for tenderness, breast mass and discharge.   Genitourinary: Negative for dysuria, frequency, genital sores, hematuria, pelvic pain, urgency, vaginal bleeding and vaginal discharge.   Musculoskeletal: Negative for arthralgias, joint swelling and myalgias.   Skin: Negative for rash.   Neurological: Positive for headaches. Negative for dizziness, weakness and paresthesias.   Psychiatric/Behavioral: Negative for mood changes. The patient is not nervous/anxious.           OBJECTIVE:   /82 (BP Location: Left arm, Patient Position: Chair, Cuff Size: Adult Large)   Pulse 84   Temp 97.2  F (36.2  C) (Temporal)   Resp 18   Ht 1.67 m (5' 5.75\")   Wt 105.2 kg (232 lb)   LMP  (Exact Date)   SpO2 97%   Breastfeeding No   BMI 37.73 kg/m    Physical Exam  GENERAL: healthy, alert and no distress  EYES: Eyes grossly normal to inspection, PERRL and conjunctivae and sclerae normal  HENT: ear canals and TM's normal, nose and mouth without ulcers or lesions  NECK: no adenopathy, no asymmetry, masses, or scars and thyroid normal to palpation  RESP: lungs clear to auscultation - no rales, rhonchi or wheezes  BREAST: normal without masses, tenderness or nipple discharge and no palpable axillary masses or adenopathy  CV: regular rate and rhythm, normal S1 S2, no S3 or S4, no murmur, click or rub, no peripheral edema and peripheral pulses strong  ABDOMEN: soft, nontender, no hepatosplenomegaly, no masses and bowel sounds normal  MS: Tenderness on the palmar surface at the base of each thumb.  She has normal movement of " both thumbs.  Normal .  SKIN: no suspicious lesions or rashes  NEURO: Normal strength and tone, mentation intact and speech normal  PSYCH: mentation appears normal, affect normal/bright        ASSESSMENT/PLAN:   (Z00.00) Routine general medical examination at a health care facility  (primary encounter diagnosis)  Comment: See counseling messages  Plan: Recheck in 1 year    (J45.30) Mild persistent asthma without complication  Comment: Doing well. Well controlled. Tolerating medication.  No change in plan.    Plan: montelukast (SINGULAIR) 10 MG tablet,         fluticasone-salmeterol (ADVAIR) 250-50 MCG/ACT         inhaler        Recheck in 1 year    (M79.644,  M79.645) Bilateral thumb pain  Comment: Patient with some bilateral thumb pain.  She is seen someone for therapy through her chiropractor's office.  She has had some mild improvement but she still continues to have an issue for this last year.  She has tenderness on exam.  She has good movement of the hand.  Plan: Physical Therapy Referral        Discussed seeing hand therapy for further evaluation and patient request to proceed.  Referral was placed.    (R23.2) Hot flashes  Comment: Patient notes some hot flashes that come and go.  She is curious about menopause.  Plan: Follicle stimulating hormone        Order placed for FSH to be added to the labs she is having drawn in February.  Will notify results.    (Z11.59) Need for hepatitis C screening test  Comment: Discussed with patient and agrees to proceed  Plan: Hepatitis C Screen Reflex to HCV RNA Quant and         Genotype        Will notify results      Patient has been advised of split billing requirements and indicates understanding: Yes      COUNSELING:  Reviewed preventive health counseling, as reflected in patient instructions       Regular exercise       Healthy diet/nutrition    Estimated body mass index is 37.73 kg/m  as calculated from the following:    Height as of this encounter: 1.67 m (5'  "5.75\").    Weight as of this encounter: 105.2 kg (232 lb).    Weight management plan: Discussed healthy diet and exercise guidelines    She reports that she quit smoking about 26 years ago. Her smoking use included cigarettes. She has never used smokeless tobacco.      Counseling Resources:  ATP IV Guidelines  Pooled Cohorts Equation Calculator  Breast Cancer Risk Calculator  BRCA-Related Cancer Risk Assessment: FHS-7 Tool  FRAX Risk Assessment  ICSI Preventive Guidelines  Dietary Guidelines for Americans, 2010  USDA's MyPlate  ASA Prophylaxis  Lung CA Screening    Fabiana Ramos MD  [facility]  "

## 2022-10-24 DIAGNOSIS — M79.645 BILATERAL THUMB PAIN: Primary | ICD-10-CM

## 2022-10-24 DIAGNOSIS — M79.644 BILATERAL THUMB PAIN: Primary | ICD-10-CM

## 2022-10-24 RX ORDER — FLUTICASONE PROPIONATE AND SALMETEROL 250; 50 UG/1; UG/1
POWDER RESPIRATORY (INHALATION)
Qty: 180 EACH | Refills: 0 | OUTPATIENT
Start: 2022-10-24

## 2022-11-22 ENCOUNTER — THERAPY VISIT (OUTPATIENT)
Dept: OCCUPATIONAL THERAPY | Facility: CLINIC | Age: 52
End: 2022-11-22
Attending: FAMILY MEDICINE
Payer: COMMERCIAL

## 2022-11-22 DIAGNOSIS — M79.645 BILATERAL THUMB PAIN: ICD-10-CM

## 2022-11-22 DIAGNOSIS — M79.644 BILATERAL THUMB PAIN: ICD-10-CM

## 2022-11-22 PROCEDURE — 97110 THERAPEUTIC EXERCISES: CPT | Mod: GO

## 2022-11-22 PROCEDURE — 97165 OT EVAL LOW COMPLEX 30 MIN: CPT | Mod: GO

## 2022-11-22 PROCEDURE — 97535 SELF CARE MNGMENT TRAINING: CPT | Mod: GO

## 2022-11-22 NOTE — PROGRESS NOTES
Hand Therapy Initial Evaluation    Current Date:  11/22/2022  Referring Provider: Fabiana Ramos MD MD Order Date: 10/24/2022    Diagnosis: Bilateral thumb pain (right is worse than left)  DOI: 8/1/2021    Subjective:  Kami Natarajan is a 52 year old female.    Answers for HPI/ROS submitted by the patient on 11/19/2022  Reason for Visit:: Thumb pain/hand therapy  When problem began:: 8/1/2021  How problem occurred:: overuse of hands  Number scale: 2/10  General health as reported by patient: fair, good  Please check all that apply to your current or past medical history: asthma, cancer, diabetes, implanted device, overweight, sleep disorder/apnea, thyroid problems  Medical allergies: none  Surgeries: cancer surgery  Medications you are currently taking: steroids, thyroid medication  Occupation:: Teacher  What are your primary job tasks: computer work, other  Other Tasks Detail: Writing/working with students    Occupational Profile Information:  Right hand dominant  Prior functional level:  independent-shared household chores  Patient reports symptoms of pain  Special tests:  none.    Previous treatment: Voltaren, massage  Transportation: drives  Currently working in normal job without restrictions  Leisure activities/hobbies: reading, weightlifting, baking, walking the dogs, rollerblading, stained glass, gardening    Functional Outcome Measure:   Upper Extremity Functional Index Score:  SCORE:   Column Totals: /80: (P) 60   (A lower score indicates greater disability.)    Objective:  Pain Level (Scale 0-10)   11/22/2022   At Rest 0/10   With Use 5/10     Pain Description  Date 11/22/2022   Location B CMC Joints   Pain Quality Shooting   Frequency intermittent     Pain is worst daytime   Exacerbated by pinching, reading, lefting   Relieved by Voltaren and massage   Progression Gradually improving     ROM  Thumb 11/22/2022 11/22/2022   AROM  (PROM) L R   MP /54 /56   IP /54 /44   RABD 34 34   PABD 37 39   [place]  Opposition Scale (0-10/10) 9+ 8++     Thumb Observation/Appearance   - none  + mild    ++ moderate    +++ severe     11/22/2022   Shoulder deformity present over CMC R: +  L: +   Edema over the CMC joint R: +  L: +   Noted collapse of MP into hyperextension during pinch R: +  L: +      Provocative Tests  Pain Report:  - none    + mild    ++ moderate    +++ severe     11/22/2022   Crepitus present R: +  L: -   CMC Adduction Stress Test R: +  L: ++   CMC Extension Stress Test R: -  L: -   Finkelstein's R: +  L: -   WHAT Test R: +  L: -       Strength   (Measured in pounds)  Pain Report: - none  + mild    ++ moderate    +++ severe    11/22/2022 11/22/2022   Trials R L   1  2  3 57 48   Average 57 48     Lat Pinch 11/22/2022 11/22/2022   Trials R L   1  2  3 11 10   Average 11 10     3 Pt Pinch 11/22/2022 11/22/2022   Trials R L   1  2  3 7 8   Average 7 8     Palpation   Pain Report:  - none    + mild    ++ moderate    +++ severe    11/22/2022   CMC Joint Line R: +  L: +   Thenar Eminence R: -  L: -   Web Space R: -  L: -   1st DC R: -  L: -   Radial Styloid R: +  L: ++   FCR R: -  L: -     Assessment:  Patient presents with symptoms consistent with diagnosis of bilateral thumb pain, with conservative intervention.    Patient's limitations or Problem List includes:  Pain, Decreased ROM/motion, Weakness, Decreased stability, Decreased , Decreased pinch and Tightness in musculature of the bilateral thumb which interferes with the patient's ability to perform Self Care Tasks (buttons), Work Tasks, Recreational Activities, Household Chores and Driving  as compared to previous level of function.    Rehab Potential:  Good - Return to full activity, some limitations    Patient will benefit from skilled Occupational Therapy to increase ROM, flexibility, motion, overall strength,  strength, pinch strength and stability of thumb and decrease pain and edema to return to previous activity level and resume normal daily  tasks and to reach their rehab potential.    Barriers to Learning:  No barrier    Communication Issues:  Patient appears to be able to clearly communicate and understand verbal and written communication and follow directions correctly.    Chart Review: Chart Review and Simple history review with patient    Identified Performance Deficits: dressing, driving and community mobility, home establishment and management, meal preparation and cleanup, work and leisure activities    Assessment of Occupational Performance:  5 or more Performance Deficits    Clinical Decision Making (Complexity): Low complexity    Treatment Explanation:  The following has been discussed with the patient:    RX ordered/plan of care  Anticipated outcomes  Possible risks and side effects    Plan:  Frequency:  1 X week, once daily  Duration:  for 6 weeks    Treatment Plan:    Modalities:    US and Paraffin   Therapeutic Exercise:    AROM, AAROM, PROM, Tendon Gliding, Place and Hold, Isotonics, Isometrics and Stabilization  Therapeutic Activities:   Functional activities   Neuromuscular re-ed:   Nerve Gliding and Kinesiotaping  Manual Techniques:   Joint mobilization, Friction massage, Myofascial release and Manual edema mobilization  Orthotic Fabrication:    Static  Self Care:    Self Care Tasks, Ergonomic Considerations and Work Tasks    Discharge Plan:  Achieve all LTG  [place]kins in home treatment program.  Reach maximal therapeutic benefit.    Home Program:  Arthritis Tips/Joint Protection  Warmth  [place] Massage to Thumb  Thumb Stabilization Web Space Release Method 1 with Clip  Thumb Stabilization Strengthening Isometric C  Thumb Stabilization 1st Dorsal Interosseous    Next Visit:  Review HEP  Discuss splinting options  US  MFR  Joint mobilization

## 2022-11-28 ENCOUNTER — LAB (OUTPATIENT)
Dept: LAB | Facility: CLINIC | Age: 52
End: 2022-11-28
Payer: COMMERCIAL

## 2022-11-28 ENCOUNTER — MYC MEDICAL ADVICE (OUTPATIENT)
Dept: ENDOCRINOLOGY | Facility: CLINIC | Age: 52
End: 2022-11-28

## 2022-11-28 DIAGNOSIS — E83.51 HYPOCALCEMIA: ICD-10-CM

## 2022-11-28 DIAGNOSIS — E66.01 MORBID OBESITY (H): ICD-10-CM

## 2022-11-28 DIAGNOSIS — E89.0 POSTOPERATIVE HYPOTHYROIDISM: ICD-10-CM

## 2022-11-28 DIAGNOSIS — R73.03 PREDIABETES: ICD-10-CM

## 2022-11-28 LAB
ALBUMIN SERPL-MCNC: 3.7 G/DL (ref 3.4–5)
CALCIUM SERPL-MCNC: 8.5 MG/DL (ref 8.5–10.1)
TSH SERPL DL<=0.005 MIU/L-ACNC: 0.42 MU/L (ref 0.4–4)

## 2022-11-28 PROCEDURE — 36415 COLL VENOUS BLD VENIPUNCTURE: CPT

## 2022-11-28 PROCEDURE — 82310 ASSAY OF CALCIUM: CPT

## 2022-11-28 PROCEDURE — 84443 ASSAY THYROID STIM HORMONE: CPT

## 2022-11-28 PROCEDURE — 82040 ASSAY OF SERUM ALBUMIN: CPT

## 2022-11-28 NOTE — RESULT ENCOUNTER NOTE
Dear Kami,     Here are your recent results which are within the expected range. Please continue with your current levothyroxine.  Prescription for semaglutide is sent to pharmacy.  Please let us know if you have any questions or concerns.    Regards,  Owen Handley MD

## 2022-12-13 ENCOUNTER — THERAPY VISIT (OUTPATIENT)
Dept: OCCUPATIONAL THERAPY | Facility: CLINIC | Age: 52
End: 2022-12-13
Payer: COMMERCIAL

## 2022-12-13 DIAGNOSIS — M79.644 BILATERAL THUMB PAIN: Primary | ICD-10-CM

## 2022-12-13 DIAGNOSIS — M79.645 BILATERAL THUMB PAIN: Primary | ICD-10-CM

## 2022-12-13 PROCEDURE — 97760 ORTHOTIC MGMT&TRAING 1ST ENC: CPT | Mod: GO

## 2022-12-13 PROCEDURE — 97140 MANUAL THERAPY 1/> REGIONS: CPT | Mod: GO

## 2022-12-19 DIAGNOSIS — E66.01 MORBID OBESITY (H): ICD-10-CM

## 2022-12-19 DIAGNOSIS — R73.03 PREDIABETES: ICD-10-CM

## 2022-12-20 NOTE — TELEPHONE ENCOUNTER
We got the transfer from Francisca Bianchi, PharmD   Pharmacy manager [facility]

## 2023-01-04 ENCOUNTER — THERAPY VISIT (OUTPATIENT)
Dept: OCCUPATIONAL THERAPY | Facility: CLINIC | Age: 53
End: 2023-01-04
Payer: COMMERCIAL

## 2023-01-04 DIAGNOSIS — M79.645 BILATERAL THUMB PAIN: Primary | ICD-10-CM

## 2023-01-04 DIAGNOSIS — M79.644 BILATERAL THUMB PAIN: Primary | ICD-10-CM

## 2023-01-04 PROCEDURE — 97763 ORTHC/PROSTC MGMT SBSQ ENC: CPT | Mod: GO

## 2023-01-04 PROCEDURE — 97140 MANUAL THERAPY 1/> REGIONS: CPT | Mod: GO

## 2023-01-31 ENCOUNTER — THERAPY VISIT (OUTPATIENT)
Dept: OCCUPATIONAL THERAPY | Facility: CLINIC | Age: 53
End: 2023-01-31
Payer: COMMERCIAL

## 2023-01-31 DIAGNOSIS — M79.644 BILATERAL THUMB PAIN: Primary | ICD-10-CM

## 2023-01-31 DIAGNOSIS — M79.645 BILATERAL THUMB PAIN: Primary | ICD-10-CM

## 2023-01-31 PROCEDURE — 97140 MANUAL THERAPY 1/> REGIONS: CPT | Mod: GO

## 2023-01-31 PROCEDURE — 97110 THERAPEUTIC EXERCISES: CPT | Mod: GO

## 2023-01-31 NOTE — PROGRESS NOTES
Hand Therapy Discharge Note    Current Date:  1/31/2023  Referring Provider: Fabiana Ramos MD MD Order Date: 10/24/2022    Diagnosis: Bilateral thumb pain (right is worse than left)  DOI: 8/1/2021    Reporting period is 11/22/2022 to 1/31/2023    Subjective:   Subjective changes noted by patient:  Pretty good. I am not great about doing the exercises. It's better. The right is still worse than the left, but between the CBD oil, Voltaren, and bracing it's helping. The marble kills.   Functional changes noted by patient:  Improvement in Driving, holding a book, weightbearing  Patient has noted adverse reaction to:  None    Functional Outcome Measure:  Upper Extremity Functional Index Score:  SCORE:   Column Totals: /80: 76   (A lower score indicates greater disability.)    Objective:  Pain Level (Scale 0-10)   11/22/2022 1/31/2023   At Rest 0/10 0/10   With Use 5/10 3/10     Pain Description  Date 11/22/2022   Location B CMC Joints   Pain Quality Shooting   Frequency intermittent     Pain is worst daytime   Exacerbated by pinching, reading, lefting   Relieved by Voltaren and massage   Progression Gradually improving     ROM  Thumb 11/22/2022 11/22/2022 1/31/2023 1/31/2023   AROM  (PROM) L R L R   MP /54 /56  /56   IP /54 /44  /53   RABD 34 34     PABD 37 39     [place] Opposition Scale (0-10/10) 9+ 8++ 10 10     Thumb Observation/Appearance   - none  + mild    ++ moderate    +++ severe     11/22/2022   Shoulder deformity present over CMC R: +  L: +   Edema over the CMC joint R: +  L: +   Noted collapse of MP into hyperextension during pinch R: +  L: +      Provocative Tests  Pain Report:  - none    + mild    ++ moderate    +++ severe     11/22/2022 1/31/2023   Crepitus present R: +  L: - R: -   CMC Adduction Stress Test R: +  L: ++ R: +  L: +   CMC Extension Stress Test R: -  L: -    Finkelstein's R: +  L: - R: -   WHAT Test R: +  L: - R: -       Strength   (Measured in pounds)  Pain Report: - none  + mild    ++  moderate    +++ severe    11/22/2022 11/22/2022   Trials R L   1  2  3 57 48   Average 57 48     Lat Pinch 11/22/2022 11/22/2022   Trials R L   1  2  3 11 10   Average 11 10     3 Pt Pinch 11/22/2022 11/22/2022   Trials R L   1  2  3 7 8   Average 7 8     Palpation   Pain Report:  - none    + mild    ++ moderate    +++ severe    11/22/2022 1/31/2023   CMC Joint Line R: +  L: + R: ++  L: +   Thenar Eminence R: -  L: -    Web Space R: -  L: -    1st DC R: -  L: -    Radial Styloid R: +  L: ++ R: -  L: -   FCR R: -  L: -      Please refer to the daily flowsheet for treatment provided today.     Assessment:  Response to therapy has been improvement to:  ROM of Thumb:  Flex and opposition  Pain:  frequency is less, intensity of pain is decreased, duration of pain is decreased and less tender over affected area    Overall Assessment:  Patient's symptoms are resolving.  Patient is becoming more independent in home exercise program  STG/LTG:  STGoals have been reviewed and progress or achievement has occurred;  see goal sheet for details and updates.    Plan:  Patient is ready to discharge from hand therapy. They will independently manage their symptoms and HEP.    Home Exercise Program:  Arthritis Tips/Joint Protection  Warmth  [place] Massage to Thumb  Thumb Stabilization Web Space Release Method 1 with Clip  Thumb Stabilization Strengthening Isometric C  Thumb Stabilization 1st Dorsal Interosseous

## 2023-03-07 DIAGNOSIS — E89.0 POSTOPERATIVE HYPOTHYROIDISM: ICD-10-CM

## 2023-03-09 RX ORDER — LEVOTHYROXINE SODIUM 200 UG/1
200 TABLET ORAL DAILY
Qty: 90 TABLET | Refills: 1 | Status: SHIPPED | OUTPATIENT
Start: 2023-03-09 | End: 2023-08-08

## 2023-03-09 NOTE — TELEPHONE ENCOUNTER
levothyroxine (SYNTHROID/LEVOTHROID) 200 MCG tablet    Virtual Visit    8/2/2022  [facility] Owen Francis MD  Endocrinology, Diabetes, and Metabolism

## 2023-05-23 ENCOUNTER — PATIENT OUTREACH (OUTPATIENT)
Dept: CARE COORDINATION | Facility: CLINIC | Age: 53
End: 2023-05-23
Payer: COMMERCIAL

## 2023-06-02 DIAGNOSIS — E66.01 MORBID OBESITY (H): ICD-10-CM

## 2023-06-02 DIAGNOSIS — R73.03 PREDIABETES: ICD-10-CM

## 2023-06-07 NOTE — TELEPHONE ENCOUNTER
semaglutide (OZEMPIC) 2 MG/1.5ML SOPN pen      Last Written Prescription Date:  11-28-22  Last Fill Quantity: 4.5ml,   # refills: 1  Last Office Visit : 8-22-22  Future Office visit:  8-8-23      Outside lab: 11-13-22  CREATININE 0.57 - 1.11 mg/dL 1.00          Routing refill request to provider for review/approval because:  Overdue lab: A1c

## 2023-06-20 ENCOUNTER — PATIENT OUTREACH (OUTPATIENT)
Dept: CARE COORDINATION | Facility: CLINIC | Age: 53
End: 2023-06-20
Payer: COMMERCIAL

## 2023-08-04 DIAGNOSIS — E83.51 HYPOCALCEMIA: ICD-10-CM

## 2023-08-04 DIAGNOSIS — E89.0 POSTOPERATIVE HYPOTHYROIDISM: ICD-10-CM

## 2023-08-04 DIAGNOSIS — E89.0 S/P COMPLETE THYROIDECTOMY: ICD-10-CM

## 2023-08-08 ENCOUNTER — ANCILLARY PROCEDURE (OUTPATIENT)
Dept: ULTRASOUND IMAGING | Facility: CLINIC | Age: 53
End: 2023-08-08
Attending: INTERNAL MEDICINE
Payer: COMMERCIAL

## 2023-08-08 ENCOUNTER — OFFICE VISIT (OUTPATIENT)
Dept: ENDOCRINOLOGY | Facility: CLINIC | Age: 53
End: 2023-08-08
Payer: COMMERCIAL

## 2023-08-08 ENCOUNTER — TELEPHONE (OUTPATIENT)
Dept: ENDOCRINOLOGY | Facility: CLINIC | Age: 53
End: 2023-08-08

## 2023-08-08 VITALS
SYSTOLIC BLOOD PRESSURE: 123 MMHG | HEART RATE: 79 BPM | WEIGHT: 217.6 LBS | HEIGHT: 65 IN | DIASTOLIC BLOOD PRESSURE: 85 MMHG | OXYGEN SATURATION: 94 % | BODY MASS INDEX: 36.25 KG/M2

## 2023-08-08 DIAGNOSIS — R73.03 PREDIABETES: ICD-10-CM

## 2023-08-08 DIAGNOSIS — C73 PAPILLARY THYROID CARCINOMA (H): Primary | ICD-10-CM

## 2023-08-08 DIAGNOSIS — C73 PAPILLARY THYROID CARCINOMA (H): ICD-10-CM

## 2023-08-08 DIAGNOSIS — E66.01 MORBID OBESITY (H): ICD-10-CM

## 2023-08-08 DIAGNOSIS — E83.51 HYPOCALCEMIA: ICD-10-CM

## 2023-08-08 DIAGNOSIS — E89.0 POSTOPERATIVE HYPOTHYROIDISM: ICD-10-CM

## 2023-08-08 LAB
ANION GAP SERPL CALCULATED.3IONS-SCNC: 11 MMOL/L (ref 7–15)
BUN SERPL-MCNC: 10.4 MG/DL (ref 6–20)
CALCIUM SERPL-MCNC: 8.8 MG/DL (ref 8.6–10)
CHLORIDE SERPL-SCNC: 105 MMOL/L (ref 98–107)
CREAT SERPL-MCNC: 0.96 MG/DL (ref 0.51–0.95)
DEPRECATED HCO3 PLAS-SCNC: 25 MMOL/L (ref 22–29)
GFR SERPL CREATININE-BSD FRML MDRD: 70 ML/MIN/1.73M2
GLUCOSE SERPL-MCNC: 82 MG/DL (ref 70–99)
HBA1C MFR BLD: 5.1 % (ref 0–5.6)
POTASSIUM SERPL-SCNC: 4.1 MMOL/L (ref 3.4–5.3)
SODIUM SERPL-SCNC: 141 MMOL/L (ref 136–145)
T4 FREE SERPL-MCNC: 2.07 NG/DL (ref 0.9–1.7)
TSH SERPL DL<=0.005 MIU/L-ACNC: 0.24 UIU/ML (ref 0.3–4.2)

## 2023-08-08 PROCEDURE — 99215 OFFICE O/P EST HI 40 MIN: CPT | Performed by: INTERNAL MEDICINE

## 2023-08-08 PROCEDURE — 99000 SPECIMEN HANDLING OFFICE-LAB: CPT | Performed by: INTERNAL MEDICINE

## 2023-08-08 PROCEDURE — 36415 COLL VENOUS BLD VENIPUNCTURE: CPT | Performed by: INTERNAL MEDICINE

## 2023-08-08 PROCEDURE — 84443 ASSAY THYROID STIM HORMONE: CPT | Performed by: INTERNAL MEDICINE

## 2023-08-08 PROCEDURE — 82306 VITAMIN D 25 HYDROXY: CPT | Performed by: INTERNAL MEDICINE

## 2023-08-08 PROCEDURE — 80048 BASIC METABOLIC PNL TOTAL CA: CPT | Performed by: INTERNAL MEDICINE

## 2023-08-08 PROCEDURE — 83036 HEMOGLOBIN GLYCOSYLATED A1C: CPT | Performed by: INTERNAL MEDICINE

## 2023-08-08 PROCEDURE — 76536 US EXAM OF HEAD AND NECK: CPT

## 2023-08-08 PROCEDURE — 84432 ASSAY OF THYROGLOBULIN: CPT | Mod: 90 | Performed by: INTERNAL MEDICINE

## 2023-08-08 PROCEDURE — 84439 ASSAY OF FREE THYROXINE: CPT | Performed by: INTERNAL MEDICINE

## 2023-08-08 PROCEDURE — 86800 THYROGLOBULIN ANTIBODY: CPT | Mod: 90 | Performed by: INTERNAL MEDICINE

## 2023-08-08 RX ORDER — CALCITRIOL 0.25 UG/1
0.25 CAPSULE, LIQUID FILLED ORAL DAILY
Qty: 90 CAPSULE | Refills: 3 | Status: SHIPPED | OUTPATIENT
Start: 2023-08-08 | End: 2024-07-29

## 2023-08-08 RX ORDER — CALCITRIOL 0.25 UG/1
0.25 CAPSULE, LIQUID FILLED ORAL DAILY
Qty: 90 CAPSULE | Refills: 0 | OUTPATIENT
Start: 2023-08-08

## 2023-08-08 RX ORDER — LEVOTHYROXINE SODIUM 200 UG/1
200 TABLET ORAL DAILY
Qty: 90 TABLET | Refills: 3 | Status: SHIPPED | OUTPATIENT
Start: 2023-08-08 | End: 2023-08-10

## 2023-08-08 NOTE — TELEPHONE ENCOUNTER
PRIOR AUTHORIZATION DENIED    Medication: OZEMPIC (1 MG/DOSE) 4 MG/3ML SC SOPN  Insurance Company: [employer] - Phone [phone] Fax [phone]  Denial Date: 8/8/2023  Denial Rational: pt does not have type 2 diabetes  Appeal Information: fax [phone]  [phone]  Patient Notified:      DO you want to appeal?  Insurances typically do not cover Ozempic strictly for weight loss or Pre-diabetes

## 2023-08-08 NOTE — NURSING NOTE
"Kami Natarajan's goals for this visit include:   Chief Complaint   Patient presents with    Thyroid Disease     She requests these members of her care team be copied on today's visit information: No    PCP: Fabiana Ramos    Referring Provider:  Referred Self, MD  No address on file    /85 (BP Location: Left arm, Patient Position: Sitting, Cuff Size: Adult Large)   Pulse 79   Ht 1.66 m (5' 5.35\")   Wt 98.7 kg (217 lb 9.6 oz)   SpO2 94%   BMI 35.82 kg/m      Do you need any medication refills at today's visit? Yes    "

## 2023-08-08 NOTE — PATIENT INSTRUCTIONS
Kami,    Orders Placed This Encounter   Procedures    US head neck soft tissue    Thyroglobulin and Antibody (Sendout to [facility])    TSH with free T4 reflex    Basic metabolic panel    Vitamin D Deficiency (D3 Only)    Hemoglobin A1c   Levothyroxine should be taken on empty stomach and wait at least 30 minutes before eating. Don't take supplements or multivitamins containing iron and calcium within 4 hours of taking levothyroxine tablet.

## 2023-08-08 NOTE — TELEPHONE ENCOUNTER
Calcitriol Oral Capsule 0.25 MCG   calcitRIOL (ROCALTROL) 0.25 MCG capsule 90 capsule 3 8/8/2023  No   Sig - Route: Take 1 capsule (0.25 mcg) by mouth daily - Oral   Sent to pharmacy as: Calcitriol 0.25 MCG Oral Capsule (ROCALTROL)   Class: E-Prescribe   Order: [id number]   E-Prescribing Status: Receipt confirmed by pharmacy (8/8/2023 11:46 AM CDT)     Printout Tracking    External Result Report     Pharmacy    [id number] - [facility] [address] [facility]

## 2023-08-08 NOTE — TELEPHONE ENCOUNTER
PA Initiation    Medication: OZEMPIC (1 MG/DOSE) 4 MG/3ML SC SOPN  Insurance Company: [employer] - Phone [phone] Fax [phone]  Pharmacy Filling the Rx:    Filling Pharmacy Phone:    Filling Pharmacy Fax:    Start Date: 8/8/2023    Key: [id number]

## 2023-08-08 NOTE — PROGRESS NOTES
Endocrinology Clinic Visit    Chief Complaint: Thyroid Disease     Information obtained from:Patient  Patient will be establishing care locally and will not be coming for a visit here any longer.        Assessment/Treatment Plan:      History of papillary thyroid carcinoma; follicular variant: diagnosed with papillary thyroid cancer in 2012 and underwent total thyroidectomy on 8/8/2012.  Pathology was well-differentiated invasive papillary carcinoma, follicular variant with a 2.1 cm on the left side and 0.8 cm on the right side.  There was lymphovascular invasion bilaterally. She underwent radioiodine ablation with 131 mCi of I-131 on 1/24/2013.  Post scan showed 2 foci of uptake in the anterior neck which was consistent with residual thyroid tissue.  There was no significant uptake in other parts of the body.  Thyroglobulin level has been undetectable after surgery.  Unremarkable neck ultrasound finding.  TSH within the desired range.  Continue the same dose..   Pending thyroglobulin level  No evidence of biochemical or structural recurrence   Neck ultrasound pending    Hypocalcemia secondary to hypoparathyroidism: She is currently on calcium 1200 mg twice daily and also calcitriol 0.25 mg daily [reduced from previous twice daily dose].  Calcium level stable.  Given normal parathyroid level and normal calcium levels over the years.  It might be reasonable to stop calcitriol and reassess calcium levels.  She will follow-up with her local endocrinologist regarding this.    Morbid obesity-started semaglutide at last visit and she has successfully lost 30 pounds.  We are increasing dose of semaglutide to 1 mg weekly.  Unfortunately we heard back from her pharmacy that semaglutide is not any longer covered by her insurance for weight loss or prediabetes.  Follow-up locally with your primary care physician regarding this issue.  Body mass index is 35.82 kg/m .  250>>>217     Prediabetes has resolved.     Orders Placed  "This Encounter   Procedures    US head neck soft tissue    Thyroglobulin and Antibody (Sendout to [facility])    TSH with free T4 reflex    Basic metabolic panel    Vitamin D Deficiency (D3 Only)    Hemoglobin A1c    T4 free           Owen Handley MD  Staff Endocrinologist    Division of Endocrinology and Diabetes  Subjective:         HPI: Kami Natarajan is a 53 year old female with history of papillary thyroid carcinoma with follicular variant who is here for follow-up of the same.      She was diagnosed with papillary thyroid cancer in 2012 and underwent total thyroidectomy on 8/8/2012.  The full pathology report is documented below.  Pathology was well-differentiated invasive papillary carcinoma, follicular variant with a 2.1 cm on the left side and 0.8 cm on the right side.  There was lymphovascular invasion bilaterally.  \"A.  Thyroid, left lobe and isthmus, thyroidectomy:       -  Well-differentiated invasive papillary carcinoma, follicular  variant.       -  Tumor size:  2.1 cm.       -  Tumor is confined to the thyroid and extends to 0.2 cm to the  closest resection margin.       -  Lymphovascular invasion .       -  Two hyperplastic nodules, 0.4 and 0.3 cm in diameter.    B.  Thyroid, right lobe, thyroidectomy:       -  Well-differentiated invasive papillary thyroid carcinoma,  follicular variant.       -  Tumor size:  0.8 cm.       -  Tumor is confined to the thyroid and is 0.1 cm from the nearest  surgical margin.       -  No evidence of lymphovascular invasion.       -  Hyperplastic nodule, 0.2 cm in diameter.  C.  Lymph node and parathyroid, left neck, excision:       -  No evidence of malignancy in three lymph nodes (0/3).       -  Benign parathyroid tissue.  D.  Lymph node, right neck, excision:       -  No evidence of malignancy in one lymph node (0/1).\"    She underwent radioiodine ablation with 131 mCi of I-131 on 1/24/2013.  Post scan showed 2 foci of uptake in the anterior neck which was consistent " with residual thyroid tissue.  There was no significant uptake in other parts of the body.    Over the years she has had a thyroglobulin level which was undetectable.  Neck ultrasound also did not show any worrisome lymphadenopathy.    She reports that she is currently on levothyroxine 200 mcg daily; half tablet one day a week.  Reports compliance with medication.  Denies hyper or hypothyroid symptoms including palpitation, GI symptoms, tremor, weight loss or hair/ skin changes.    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  She is currently on calcium 1200 mg twice daily and also calcitriol 0.25 mg twice daily.  Her last calcium labs indicated calcium was normal.   She also takes vitamin D supplement 1000 international unit(s) daily.  Denies any numbness or tingling.  Denies any muscle cramps.    Reports that she is physically active at home.  Working on life style changes for weight.   We started her on weight loss medication about a year ago and she has successfully lost about 30 pounds.     No Known Allergies    Current Outpatient Medications   Medication Sig Dispense Refill    albuterol (PROAIR HFA/PROVENTIL HFA/VENTOLIN HFA) 108 (90 Base) MCG/ACT inhaler Inhale 2 puffs into the lungs every 4 hours as needed for shortness of breath / dyspnea 1 Inhaler 6    calcitRIOL (ROCALTROL) 0.25 MCG capsule Take 1 capsule (0.25 mcg) by mouth daily 90 capsule 3    calcium 600 MG tablet Take 2 tablets (1,200 mg) by mouth 2 times daily 360 tablet 3    Fluticasone Propionate (FLONASE NA) Spray 1 spray in nostril daily      fluticasone-salmeterol (ADVAIR) 250-50 MCG/ACT inhaler INHALE ONE PUFF BY MOUTH TWICE DAILY 180 each 3    ibuprofen (ADVIL/MOTRIN) 200 MG tablet Take 200 mg by mouth every 4 hours as needed for mild pain      levonorgestrel (MIRENA (52 MG)) 20 MCG/24HR IUD 1 each by Intrauterine route once       levothyroxine (SYNTHROID/LEVOTHROID) 200 MCG tablet Take 1 tablet (200  "mcg) by mouth daily SIX DAYS A WEEK AND TAKE HALF A TABLET (100MCG) ONE DAY A WEEK. 90 tablet 3    loratadine (CLARITIN) 10 MG tablet Take 10 mg by mouth daily      montelukast (SINGULAIR) 10 MG tablet Take 1 tablet (10 mg) by mouth daily 90 tablet 3    omeprazole (PRILOSEC) 10 MG DR capsule Take 20 mg by mouth every other day      Probiotic Product (PROBIOTIC DAILY PO)       semaglutide (OZEMPIC) 2 MG/1.5ML SOPN pen Inject 0.5 mg Subcutaneous every 7 days Please keep 8-8-23 clinic appt for refills, lab due 4.5 mL 1    Semaglutide, 1 MG/DOSE, (OZEMPIC) 4 MG/3ML pen Inject 1 mg Subcutaneous every 7 days 9 mL 3    Vitamin D, Cholecalciferol, 1000 units CAPS          Review of Systems     as per HPI above  Answers submitted by the patient for this visit:  Symptoms you have experienced in the last 30 days (Submitted on 8/1/2023)  General Symptoms: No  Skin Symptoms: No  HENT Symptoms: No  EYE SYMPTOMS: No  HEART SYMPTOMS: No  LUNG SYMPTOMS: No  INTESTINAL SYMPTOMS: No  URINARY SYMPTOMS: No  GYNECOLOGIC SYMPTOMS: No  BREAST SYMPTOMS: No  SKELETAL SYMPTOMS: No  BLOOD SYMPTOMS: No  NERVOUS SYSTEM SYMPTOMS: No  MENTAL HEALTH SYMPTOMS: No          Objective:   /85 (BP Location: Left arm, Patient Position: Sitting, Cuff Size: Adult Large)   Pulse 79   Ht 1.66 m (5' 5.35\")   Wt 98.7 kg (217 lb 9.6 oz)   SpO2 94%   BMI 35.82 kg/m       Constitutional: Pleasant no acute cardiopulmonary distress.   EYES: anicteric, normal extra-ocular movements, no lid lag or retraction, is equal and reactive to light bilaterally.  HEENT: Mouth/Throat: Mucous membrane is moist. Oropharynx is clear.  Thyroid examination: Surgical scar  Cardiovascular: RRR, S1, S2 normal.   Pulmonary/Chest: CTAB. No wheezing or rales.   Abdominal: +BS. Non tender to palpation.  Stretch marks: No significant  Neurological: Alert and oriented.  No tremor and reflexes are symmetrical bilaterally and within the normal limits. Muscle strength 5/5. "   Extremities: No edema.  Psychological: appropriate mood and affect      In House Labs:   Hemoglobin A1C   Date Value Ref Range Status   08/08/2023 5.1 0.0 - 5.6 % Final     Comment:     Normal <5.7%   Prediabetes 5.7-6.4%    Diabetes 6.5% or higher     Note: Adopted from ADA consensus guidelines.   08/04/2022 5.8 (H) 0.0 - 5.6 % Final     Comment:     Normal <5.7%   Prediabetes 5.7-6.4%    Diabetes 6.5% or higher     Note: Adopted from ADA consensus guidelines.   11/05/2014 5.3 4.3 - 6.0 % Final      TSH   Date Value Ref Range Status   08/08/2023 0.24 (L) 0.30 - 4.20 uIU/mL Final   11/28/2022 0.42 0.40 - 4.00 mU/L Final   08/04/2022 0.22 (L) 0.40 - 4.00 mU/L Final   06/28/2021 0.19 (L) 0.40 - 4.00 mU/L Final   10/15/2020 0.44 0.40 - 4.00 mU/L Final   12/23/2019 0.30 (L) 0.40 - 4.00 mU/L Final   06/24/2019 0.66 0.40 - 4.00 mU/L Final   01/04/2019 0.72 0.40 - 4.00 mU/L Final     T4 Free   Date Value Ref Range Status   06/28/2021 1.38 0.76 - 1.46 ng/dL Final   12/23/2019 1.48 (H) 0.76 - 1.46 ng/dL Final   06/24/2019 1.53 (H) 0.76 - 1.46 ng/dL Final   01/04/2019 1.32 0.76 - 1.46 ng/dL Final   01/15/2018 1.48 (H) 0.76 - 1.46 ng/dL Final     Free T4   Date Value Ref Range Status   08/04/2022 1.69 (H) 0.76 - 1.46 ng/dL Final       Creatinine   Date Value Ref Range Status   08/08/2023 0.96 (H) 0.51 - 0.95 mg/dL Final   06/28/2021 1.00 0.52 - 1.04 mg/dL Final   ]         Component      Latest Ref Rng 8/8/2023  12:07 PM   Sodium      136 - 145 mmol/L 141    Potassium      3.4 - 5.3 mmol/L 4.1    Chloride      98 - 107 mmol/L 105    Carbon Dioxide (CO2)      22 - 29 mmol/L 25    Anion Gap      7 - 15 mmol/L 11    Urea Nitrogen      6.0 - 20.0 mg/dL 10.4    Creatinine      0.51 - 0.95 mg/dL 0.96 (H)    Calcium      8.6 - 10.0 mg/dL 8.8    Glucose      70 - 99 mg/dL 82    GFR Estimate      >60 mL/min/1.73m2 70    TSH      0.30 - 4.20 uIU/mL 0.24 (L)    Hemoglobin A1C      0.0 - 5.6 % 5.1                                                                IMPRESSION:  1.  Soft tissue neck ultrasound with lymph node measurements as  described above. No abnormal lymphadenopathy.         40 minutes spent on the date of the encounter doing chart review, history, documentation, coordination of care and further activities per the note.

## 2023-08-08 NOTE — LETTER
8/8/2023         RE: Kami Natarajan  [address] [place] [address]        Dear Colleague,    Thank you for referring your patient, Kami Natarajan, to the [facility]. Please see a copy of my visit note below.    Endocrinology Clinic Visit    Chief Complaint: Thyroid Disease     Information obtained from:Patient  Patient will be establishing care locally and will not be coming for a visit here any longer.        Assessment/Treatment Plan:      History of papillary thyroid carcinoma; follicular variant: diagnosed with papillary thyroid cancer in 2012 and underwent total thyroidectomy on 8/8/2012.  Pathology was well-differentiated invasive papillary carcinoma, follicular variant with a 2.1 cm on the left side and 0.8 cm on the right side.  There was lymphovascular invasion bilaterally. She underwent radioiodine ablation with 131 mCi of I-131 on 1/24/2013.  Post scan showed 2 foci of uptake in the anterior neck which was consistent with residual thyroid tissue.  There was no significant uptake in other parts of the body.  Thyroglobulin level has been undetectable after surgery.  Unremarkable neck ultrasound finding.  TSH within the desired range.  Continue the same dose..   Pending thyroglobulin level  No evidence of biochemical or structural recurrence   Neck ultrasound pending    Hypocalcemia secondary to hypoparathyroidism: She is currently on calcium 1200 mg twice daily and also calcitriol 0.25 mg daily [reduced from previous twice daily dose].  Calcium level stable.  Given normal parathyroid level and normal calcium levels over the years.  It might be reasonable to stop calcitriol and reassess calcium levels.  She will follow-up with her local endocrinologist regarding this.    Morbid obesity-started semaglutide at last visit and she has successfully lost 30 pounds.  We are increasing dose of semaglutide to 1 mg weekly.  Unfortunately we heard back from her pharmacy that  "semaglutide is not any longer covered by her insurance for weight loss or prediabetes.  Follow-up locally with your primary care physician regarding this issue.  Body mass index is 35.82 kg/m .  250>>>217     Prediabetes has resolved.     Orders Placed This Encounter   Procedures     US head neck soft tissue     Thyroglobulin and Antibody (Sendout to [facility])     TSH with free T4 reflex     Basic metabolic panel     Vitamin D Deficiency (D3 Only)     Hemoglobin A1c     T4 free           Owen Handley MD  Staff Endocrinologist    Division of Endocrinology and Diabetes  Subjective:         HPI: Kami Natarajan is a 53 year old female with history of papillary thyroid carcinoma with follicular variant who is here for follow-up of the same.      She was diagnosed with papillary thyroid cancer in 2012 and underwent total thyroidectomy on 8/8/2012.  The full pathology report is documented below.  Pathology was well-differentiated invasive papillary carcinoma, follicular variant with a 2.1 cm on the left side and 0.8 cm on the right side.  There was lymphovascular invasion bilaterally.  \"A.  Thyroid, left lobe and isthmus, thyroidectomy:       -  Well-differentiated invasive papillary carcinoma, follicular  variant.       -  Tumor size:  2.1 cm.       -  Tumor is confined to the thyroid and extends to 0.2 cm to the  closest resection margin.       -  Lymphovascular invasion .       -  Two hyperplastic nodules, 0.4 and 0.3 cm in diameter.    B.  Thyroid, right lobe, thyroidectomy:       -  Well-differentiated invasive papillary thyroid carcinoma,  follicular variant.       -  Tumor size:  0.8 cm.       -  Tumor is confined to the thyroid and is 0.1 cm from the nearest  surgical margin.       -  No evidence of lymphovascular invasion.       -  Hyperplastic nodule, 0.2 cm in diameter.  C.  Lymph node and parathyroid, left neck, excision:       -  No evidence of malignancy in three lymph nodes (0/3).       -  Benign parathyroid " "tissue.  D.  Lymph node, right neck, excision:       -  No evidence of malignancy in one lymph node (0/1).\"    She underwent radioiodine ablation with 131 mCi of I-131 on 1/24/2013.  Post scan showed 2 foci of uptake in the anterior neck which was consistent with residual thyroid tissue.  There was no significant uptake in other parts of the body.    Over the years she has had a thyroglobulin level which was undetectable.  Neck ultrasound also did not show any worrisome lymphadenopathy.    She reports that she is currently on levothyroxine 200 mcg daily; half tablet one day a week.  Reports compliance with medication.  Denies hyper or hypothyroid symptoms including palpitation, GI symptoms, tremor, weight loss or hair/ skin changes.    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  She is currently on calcium 1200 mg twice daily and also calcitriol 0.25 mg twice daily.  Her last calcium labs indicated calcium was normal.   She also takes vitamin D supplement 1000 international unit(s) daily.  Denies any numbness or tingling.  Denies any muscle cramps.    Reports that she is physically active at home.  Working on life style changes for weight.   We started her on weight loss medication about a year ago and she has successfully lost about 30 pounds.     No Known Allergies    Current Outpatient Medications   Medication Sig Dispense Refill     albuterol (PROAIR HFA/PROVENTIL HFA/VENTOLIN HFA) 108 (90 Base) MCG/ACT inhaler Inhale 2 puffs into the lungs every 4 hours as needed for shortness of breath / dyspnea 1 Inhaler 6     calcitRIOL (ROCALTROL) 0.25 MCG capsule Take 1 capsule (0.25 mcg) by mouth daily 90 capsule 3     calcium 600 MG tablet Take 2 tablets (1,200 mg) by mouth 2 times daily 360 tablet 3     Fluticasone Propionate (FLONASE NA) Spray 1 spray in nostril daily       fluticasone-salmeterol (ADVAIR) 250-50 MCG/ACT inhaler INHALE ONE PUFF BY MOUTH TWICE DAILY 180 each 3 " "    ibuprofen (ADVIL/MOTRIN) 200 MG tablet Take 200 mg by mouth every 4 hours as needed for mild pain       levonorgestrel (MIRENA (52 MG)) 20 MCG/24HR IUD 1 each by Intrauterine route once        levothyroxine (SYNTHROID/LEVOTHROID) 200 MCG tablet Take 1 tablet (200 mcg) by mouth daily SIX DAYS A WEEK AND TAKE HALF A TABLET (100MCG) ONE DAY A WEEK. 90 tablet 3     loratadine (CLARITIN) 10 MG tablet Take 10 mg by mouth daily       montelukast (SINGULAIR) 10 MG tablet Take 1 tablet (10 mg) by mouth daily 90 tablet 3     omeprazole (PRILOSEC) 10 MG DR capsule Take 20 mg by mouth every other day       Probiotic Product (PROBIOTIC DAILY PO)        semaglutide (OZEMPIC) 2 MG/1.5ML SOPN pen Inject 0.5 mg Subcutaneous every 7 days Please keep 8-8-23 clinic appt for refills, lab due 4.5 mL 1     Semaglutide, 1 MG/DOSE, (OZEMPIC) 4 MG/3ML pen Inject 1 mg Subcutaneous every 7 days 9 mL 3     Vitamin D, Cholecalciferol, 1000 units CAPS          Review of Systems     as per HPI above  Answers submitted by the patient for this visit:  Symptoms you have experienced in the last 30 days (Submitted on 8/1/2023)  General Symptoms: No  Skin Symptoms: No  HENT Symptoms: No  EYE SYMPTOMS: No  HEART SYMPTOMS: No  LUNG SYMPTOMS: No  INTESTINAL SYMPTOMS: No  URINARY SYMPTOMS: No  GYNECOLOGIC SYMPTOMS: No  BREAST SYMPTOMS: No  SKELETAL SYMPTOMS: No  BLOOD SYMPTOMS: No  NERVOUS SYSTEM SYMPTOMS: No  MENTAL HEALTH SYMPTOMS: No          Objective:   /85 (BP Location: Left arm, Patient Position: Sitting, Cuff Size: Adult Large)   Pulse 79   Ht 1.66 m (5' 5.35\")   Wt 98.7 kg (217 lb 9.6 oz)   SpO2 94%   BMI 35.82 kg/m       Constitutional: Pleasant no acute cardiopulmonary distress.   EYES: anicteric, normal extra-ocular movements, no lid lag or retraction, is equal and reactive to light bilaterally.  HEENT: Mouth/Throat: Mucous membrane is moist. Oropharynx is clear.  Thyroid examination: Surgical scar  Cardiovascular: RRR, S1, S2 " normal.   Pulmonary/Chest: CTAB. No wheezing or rales.   Abdominal: +BS. Non tender to palpation.  Stretch marks: No significant  Neurological: Alert and oriented.  No tremor and reflexes are symmetrical bilaterally and within the normal limits. Muscle strength 5/5.   Extremities: No edema.  Psychological: appropriate mood and affect      In House Labs:   Hemoglobin A1C   Date Value Ref Range Status   08/08/2023 5.1 0.0 - 5.6 % Final     Comment:     Normal <5.7%   Prediabetes 5.7-6.4%    Diabetes 6.5% or higher     Note: Adopted from ADA consensus guidelines.   08/04/2022 5.8 (H) 0.0 - 5.6 % Final     Comment:     Normal <5.7%   Prediabetes 5.7-6.4%    Diabetes 6.5% or higher     Note: Adopted from ADA consensus guidelines.   11/05/2014 5.3 4.3 - 6.0 % Final      TSH   Date Value Ref Range Status   08/08/2023 0.24 (L) 0.30 - 4.20 uIU/mL Final   11/28/2022 0.42 0.40 - 4.00 mU/L Final   08/04/2022 0.22 (L) 0.40 - 4.00 mU/L Final   06/28/2021 0.19 (L) 0.40 - 4.00 mU/L Final   10/15/2020 0.44 0.40 - 4.00 mU/L Final   12/23/2019 0.30 (L) 0.40 - 4.00 mU/L Final   06/24/2019 0.66 0.40 - 4.00 mU/L Final   01/04/2019 0.72 0.40 - 4.00 mU/L Final     T4 Free   Date Value Ref Range Status   06/28/2021 1.38 0.76 - 1.46 ng/dL Final   12/23/2019 1.48 (H) 0.76 - 1.46 ng/dL Final   06/24/2019 1.53 (H) 0.76 - 1.46 ng/dL Final   01/04/2019 1.32 0.76 - 1.46 ng/dL Final   01/15/2018 1.48 (H) 0.76 - 1.46 ng/dL Final     Free T4   Date Value Ref Range Status   08/04/2022 1.69 (H) 0.76 - 1.46 ng/dL Final       Creatinine   Date Value Ref Range Status   08/08/2023 0.96 (H) 0.51 - 0.95 mg/dL Final   06/28/2021 1.00 0.52 - 1.04 mg/dL Final   ]         Component      Latest Ref Rng 8/8/2023  12:07 PM   Sodium      136 - 145 mmol/L 141    Potassium      3.4 - 5.3 mmol/L 4.1    Chloride      98 - 107 mmol/L 105    Carbon Dioxide (CO2)      22 - 29 mmol/L 25    Anion Gap      7 - 15 mmol/L 11    Urea Nitrogen      6.0 - 20.0 mg/dL 10.4     Creatinine      0.51 - 0.95 mg/dL 0.96 (H)    Calcium      8.6 - 10.0 mg/dL 8.8    Glucose      70 - 99 mg/dL 82    GFR Estimate      >60 mL/min/1.73m2 70    TSH      0.30 - 4.20 uIU/mL 0.24 (L)    Hemoglobin A1C      0.0 - 5.6 % 5.1                                                               IMPRESSION:  1.  Soft tissue neck ultrasound with lymph node measurements as  described above. No abnormal lymphadenopathy.         40 minutes spent on the date of the encounter doing chart review, history, documentation, coordination of care and further activities per the note.      Again, thank you for allowing me to participate in the care of your patient.        Sincerely,        Owen Handley MD

## 2023-08-10 DIAGNOSIS — E89.0 POSTOPERATIVE HYPOTHYROIDISM: ICD-10-CM

## 2023-08-10 LAB — DEPRECATED CALCIDIOL+CALCIFEROL SERPL-MC: 54 UG/L (ref 20–75)

## 2023-08-10 RX ORDER — LEVOTHYROXINE SODIUM 175 UG/1
175 TABLET ORAL DAILY
Qty: 90 TABLET | Refills: 3 | Status: SHIPPED | OUTPATIENT
Start: 2023-08-10

## 2023-08-10 NOTE — RESULT ENCOUNTER NOTE
Hello -    Thyroid blood work results suggest that we need to reduce your levothyroxine dose. Please adjust as follows. New prescription is sent to your pharmacy.  Thyroid medication dose usually needs to go lower when people are losing weight.     Disp Refills Start End PASCALE  levothyroxine (SYNTHROID/LEVOTHROID) 175 MCG tablet Take 1 tablet (175 mcg) by mouth daily SIX DAYS A WEEK AND TAKE HALF A TABLET ONE DAY A WEEK., Disp-90 tablet, R-3, E-Prescribe  Check follow-up thyroid blood work in 3 months.  Your hemoglobin A1c now is within the normal limits.  We were informed that your insurance no longer covers GLP-1 agonist like Ozempic for weight loss or prediabetes.  Please continue to follow-up regarding this locally with your primary care physician.      Please let us know if you have any questions or concerns.     Regards,  Owen Handley MD

## 2023-08-14 LAB — SCANNED LAB RESULT: NORMAL

## 2023-08-17 DIAGNOSIS — C73 PAPILLARY THYROID CARCINOMA (H): Primary | ICD-10-CM

## 2023-08-17 DIAGNOSIS — R59.0 LYMPHADENOPATHY, CERVICAL: ICD-10-CM

## 2023-08-17 NOTE — PROGRESS NOTES
Hello -    The neck ultrasound findings are overall stable except on the left side; in the mid neck region there is a 1 cm lymph node with abnormal findings.  The thyroglobulin level has been stable on recent check.  I recommend a follow-up neck ultrasound in 3-6 months to follow-up on this level 3 lymph node.  Order for a follow-up neck ultrasound is placed.  Please let us know if you have any questions or concerns.      Regards,  Owen Handley MD

## 2023-08-18 ENCOUNTER — TELEPHONE (OUTPATIENT)
Dept: ENDOCRINOLOGY | Facility: CLINIC | Age: 53
End: 2023-08-18
Payer: COMMERCIAL

## 2023-08-18 NOTE — TELEPHONE ENCOUNTER
Patient advised of results and recommendations from Dr. Handley. Patient verbalizes understanding and agrees to plan. Patient also noted that she read message in [employer].     Johanna Rodríguez RN  Endocrine Care Coordinator  [facility]

## 2023-08-18 NOTE — TELEPHONE ENCOUNTER
----- Message from Owen Handley MD sent at 8/17/2023  9:15 AM CDT -----  Patient would like to get telephone calls with results.  Please call and inform the following.  Hello -    The neck ultrasound findings are overall stable except on the left side; in the mid neck region there is a 1 cm lymph node with abnormal findings.  The thyroglobulin level has been stable on recent check.  I recommend a follow-up neck ultrasound in 3-6 months to follow-up on this level 3 lymph node.  Order for a follow-up neck ultrasound is placed.  Please let us know if you have any questions or concerns.      Regards,  Owen Handley MD

## 2023-08-23 ENCOUNTER — TELEPHONE (OUTPATIENT)
Dept: PHARMACY | Facility: CLINIC | Age: 53
End: 2023-08-23
Payer: COMMERCIAL

## 2023-08-23 NOTE — TELEPHONE ENCOUNTER
Patient call:   Location: [facility] [place]  Appointment type:MTM new  Provider: Patric Hernández   Return date: openings identified Oct 2nd  Speciality phone number: [phone]  Additional appointment(s) needed: no  Additional notes: Pt is being scheduled with Clinic Pharmacist   SILVIA and Chris Estevez on 8/23/2023 at 2:46 PM

## 2023-09-15 ENCOUNTER — TELEPHONE (OUTPATIENT)
Dept: PHARMACY | Facility: CLINIC | Age: 53
End: 2023-09-15
Payer: COMMERCIAL

## 2023-09-15 NOTE — TELEPHONE ENCOUNTER
Spoke with pt about rescheduling but next available was 10/27 pt kept previous appt for 10/2   Jonas Lopez on 9/15/2023 at 5:17 PM

## 2023-09-21 ENCOUNTER — PATIENT OUTREACH (OUTPATIENT)
Dept: CARE COORDINATION | Facility: CLINIC | Age: 53
End: 2023-09-21
Payer: COMMERCIAL

## 2023-09-27 ENCOUNTER — VIRTUAL VISIT (OUTPATIENT)
Dept: PHARMACY | Facility: CLINIC | Age: 53
End: 2023-09-27
Payer: COMMERCIAL

## 2023-09-27 ENCOUNTER — TELEPHONE (OUTPATIENT)
Dept: ENDOCRINOLOGY | Facility: CLINIC | Age: 53
End: 2023-09-27
Payer: COMMERCIAL

## 2023-09-27 ENCOUNTER — TELEPHONE (OUTPATIENT)
Dept: ENDOCRINOLOGY | Facility: CLINIC | Age: 53
End: 2023-09-27

## 2023-09-27 DIAGNOSIS — E89.2 POSTSURGICAL HYPOPARATHYROIDISM (H): Chronic | ICD-10-CM

## 2023-09-27 DIAGNOSIS — J45.30 MILD PERSISTENT ASTHMA WITHOUT COMPLICATION: Chronic | ICD-10-CM

## 2023-09-27 DIAGNOSIS — E66.812 CLASS 2 OBESITY DUE TO EXCESS CALORIES WITHOUT SERIOUS COMORBIDITY WITH BODY MASS INDEX (BMI) OF 39.0 TO 39.9 IN ADULT: Primary | Chronic | ICD-10-CM

## 2023-09-27 DIAGNOSIS — E66.09 CLASS 2 OBESITY DUE TO EXCESS CALORIES WITHOUT SERIOUS COMORBIDITY WITH BODY MASS INDEX (BMI) OF 39.0 TO 39.9 IN ADULT: Primary | Chronic | ICD-10-CM

## 2023-09-27 DIAGNOSIS — Z78.9 TAKES DIETARY SUPPLEMENTS: ICD-10-CM

## 2023-09-27 DIAGNOSIS — E89.0 POSTOPERATIVE HYPOTHYROIDISM: Chronic | ICD-10-CM

## 2023-09-27 PROCEDURE — 99605 MTMS BY PHARM NP 15 MIN: CPT | Mod: VID

## 2023-09-27 PROCEDURE — 99607 MTMS BY PHARM ADDL 15 MIN: CPT

## 2023-09-27 NOTE — LETTER
"Recommended To-Do List      Prepared on: 09/27/2023       You can get the best results from your medications by completing the items on this \"To-Do List.\"      Bring your To-Do List when you go to your doctor. And, share it with your family or caregivers.    My To-Do List:  What we talked about: What I should do:   The cost of your medication(s)    Change the medication you are taking from semaglutide (OZEMPIC) to Wegovy          What we talked about: What I should do:                     "

## 2023-09-27 NOTE — TELEPHONE ENCOUNTER
To Whom it May Concern:    I am writing to formally request a prior authorization of coverage for my patient,  Kami Natarajan, for treatment using Wegovy 0.25 mg #2 mL for 28 day supply and all subsequent doses.    ICD code (if different than what is on RX):  E66.09, adult. The patient will be using for FDA approved indication (if patient has a BMI greater than 30 or > 27 with weight-related comorbidities).     Please see below for rationale for coverage for Wegovy:    Current BMI is 35.82 and Wegovy would be used for comprehensive weight management  Wegovy would be used as an adjunct to a reduced-calorie diet and increased physical activity  Patient has been and is currently following a dietary, exercise, and behavior modification program for weight loss for over 2 months. Patient has tried Weight watchers, [employer] classes, and follows the [facility]  Patient has had two appointments with an endocrinologist discussing weight loss and lifestyle changes in the past six months  Other formulary options are not appropriate for this patient.  Given history of anxiety, Contrave--which contains antidepressant bupropion--can exacerbate anxiety so it is not appropriate for this patient.  Qsymia and phentermine monotherapy are also not appropriate options given the patient has a history of anxiety. Phentermine is a stimulant which can exacerbate anxiety. Saxenda is a once daily injection, which would be burdensome to this patient given requirement of daily administration vs. the once weekly convenience of Wegovy.     Please approve this patient for Wegovy to help this patient to achieve their weight loss goals.    I would request this submission be evaluated on an individual basis by an endocrinologist or weight  who is current in the practice and standards of care. I firmly believe that this therapy is clinically appropriate and that Kami Natarajan would benefit from  improved clinical outcomes and quality of life if allowed the opportunity to receive this treatment.  I urge you to follow the aforementioned evidence presented to keep the best interest of our patient in mind.

## 2023-09-27 NOTE — TELEPHONE ENCOUNTER
PA Initiation    Medication: WEGOVY 1 MG/0.5ML SC SOAJ  Insurance Company: [employer] - Phone [phone] Fax [phone]  Pharmacy Filling the Rx:    Filling Pharmacy Phone:    Filling Pharmacy Fax:    Start Date: 9/27/2023    Key: [id number]

## 2023-09-27 NOTE — TELEPHONE ENCOUNTER
Prior Authorization Approval    Medication: WEGOVY 1 MG/0.5ML SC SOAJ  Authorization Effective Date: 8/28/2023  Authorization Expiration Date: 9/26/2024  Approved Dose/Quantity: 2ml/28 days   Reference #: [id number]   Insurance Company: [employer] - Phone [phone] Fax [phone]  Expected CoPay: $ 250  CoPay Card Available:      Financial Assistance Needed: no  Which Pharmacy is filling the prescription:    Pharmacy Notified: no  Patient Notified: pharmacy will notify patient

## 2023-09-27 NOTE — PROGRESS NOTES
Medication Therapy Management (MTM) Encounter    ASSESSMENT:                            Medication Adherence/Access: See below for considerations    BMI > 35; Prediabetes: Currently stable on Ozempic therapy, but PA was denied secondary to no diagnosis of T2D. Would benefit from exploring coverage for Wegovy. Will complete prior authorization letter today and help patient transition once approved.     Hypothyroidism/hypoparathyroidism: Managed by Dr. Handley.  Last TSH returned low and levothyroxine dose was subsequently decreased.  Continue to monitor.    Asthma/Allergies: Last ACT above goal of greater than 20.  Due for recheck.  Defer to primary care.    OTC/Supplements: Stable    PLAN:                            PA letter complete for Wegovy. Please see below. Liaison will route to plan.  I will help patient transition once approved.    To Whom it May Concern:    I am writing to formally request a prior authorization of coverage for my patient,  Kami Natarajan, for treatment using Wegovy 0.25 mg #2 mL for 28 day supply and all subsequent doses.    ICD code (if different than what is on RX):  E66.09, adult. The patient will be using for FDA approved indication (if patient has a BMI greater than 30 or > 27 with weight-related comorbidities).     Please see below for rationale for coverage for Wegovy:    Current BMI is 35.82 and Wegovy would be used for comprehensive weight management  Wegovy would be used as an adjunct to a reduced-calorie diet and increased physical activity  Patient has been and is currently following a dietary, exercise, and behavior modification program for weight loss for over 2 months. Patient has tried Weight watchers, [employer] classes, and follows the [facility]  Patient has had two appointments with an endocrinologist discussing weight loss and lifestyle changes in the past six months  Other formulary options are not appropriate for this patient.   Given history of anxiety, Contrave--which contains antidepressant bupropion--can exacerbate anxiety so it is not appropriate for this patient.  Qsymia and phentermine monotherapy are also not appropriate options given the patient has a history of anxiety. Phentermine is a stimulant which can exacerbate anxiety. Saxenda is a once daily injection, which would be burdensome to this patient given requirement of daily administration vs. the once weekly convenience of Wegovy.     Please approve this patient for Wegovy to help this patient to achieve their weight loss goals.    I would request this submission be evaluated on an individual basis by an endocrinologist or weight  who is current in the practice and standards of care. I firmly believe that this therapy is clinically appropriate and that Kami Natarajan would benefit from improved clinical outcomes and quality of life if allowed the opportunity to receive this treatment.  I urge you to follow the aforementioned evidence presented to keep the best interest of our patient in mind.      SUBJECTIVE/OBJECTIVE:                          Kami Natarajan is a 53 year old female contacted via secure video for an initial visit. She was referred to me from Dr. Handley.      Reason for visit: Medication Therapy Management (MTM).    Allergies/ADRs: Reviewed in chart  Past Medical History: Reviewed in chart  Social History     Tobacco Use    Smoking status: Former     Types: Cigarettes     Quit date: 1996     Years since quittin.3    Smokeless tobacco: Never   Substance Use Topics    Alcohol use: Yes     Comment: 0-1 per month     Drug use: No        Medication Adherence/Access: Adherence will reflect on the dispense report.  Currently experiencing an issue where her primary insurance did not code her date of birth correctly and is not working.  She is working with [employer] to get it fixed.  She does have secondary insurance through health [email or web address].    BMI >  "35; Prediabetes:   Diabetes Medication(s)       Incretin Mimetic Agents       semaglutide (OZEMPIC) 2 MG/1.5ML SOPN pen    Inject 0.5 mg Subcutaneous every 7 days         Has 1 dose left of 0.5 mg.  Overall tolerating well and is pleased with appetite suppression. Dr. Handley intended to increase the dose to 1 mg weekly, but this was denied by her insurance as they now only cover for diagnosis of type 2 diabetes.    Patient has followed endocrine weight management clinic and has also tried weight watchers, [employer] programs, and diet modifications for weight loss.  She does note a history of anxiety for which she was treated with medication, so she does not qualify for phentermine at this time.      Estimated body mass index is 35.82 kg/m  as calculated from the following:    Height as of 8/8/23: 5' 5.35\" (1.66 m).    Weight as of 8/8/23: 217 lb 9.6 oz (98.7 kg).     Hypothyroidism/hypoparathyroidism: Current medications:   Levothyroxine 175 mcg daily  Calcitriol 0.25 mg daily  No reports of side effects. Managed by Dr. Handley  TSH   Date Value Ref Range Status   08/08/2023 0.24 (L) 0.30 - 4.20 uIU/mL Final   11/28/2022 0.42 0.40 - 4.00 mU/L Final   08/04/2022 0.22 (L) 0.40 - 4.00 mU/L Final   06/28/2021 0.19 (L) 0.40 - 4.00 mU/L Final   10/15/2020 0.44 0.40 - 4.00 mU/L Final   12/23/2019 0.30 (L) 0.40 - 4.00 mU/L Final   06/24/2019 0.66 0.40 - 4.00 mU/L Final   01/04/2019 0.72 0.40 - 4.00 mU/L Final     Asthma/Allergies: Current medications:   Singulair 10 mg daily  Albuterol HFA as needed   Advair 250-50 1 puff twice daily   Loratadine 10 mg as needed   Flonase as needed   No reports of side effects.       6/16/2021     2:28 PM 5/25/2022     4:48 PM 7/22/2022     3:24 PM   ACT Total Scores   ACT TOTAL SCORE (Goal Greater than or Equal to 20) 23 18 23   In the past 12 months, how many times did you visit the emergency room for your asthma without being admitted to the hospital? 0 0 0   In the past 12 months, how " "many times were you hospitalized overnight because of your asthma? 0 0 0         OTC/Supplements: Current medications:   Prilosec 20 mg every other day as needed   Vitamin D 1000 units daily  Probiotic daily  Ibuprofen 200 mg q 4 h as needed   Calcium 1200 mg twice daily   No reports of side effects.       BP Readings from Last 1 Encounters:   08/08/23 123/85     Pulse Readings from Last 1 Encounters:   08/08/23 79     Wt Readings from Last 1 Encounters:   08/08/23 217 lb 9.6 oz (98.7 kg)     Ht Readings from Last 1 Encounters:   08/08/23 5' 5.35\" (1.66 m)     Estimated body mass index is 35.82 kg/m  as calculated from the following:    Height as of 8/8/23: 5' 5.35\" (1.66 m).    Weight as of 8/8/23: 217 lb 9.6 oz (98.7 kg).    Temp Readings from Last 1 Encounters:   10/21/22 97.2  F (36.2  C) (Temporal)       ----------------      I spent 20 minutes with this patient today. Dr. Handley was provided the recommendations above via routed note and is the authorizing prescriber for this visit through the pharmacist collaborative practice agreement. A copy of the visit note was provided to the patient's provider(s).    The patient was given to the patient a summary of these recommendations.     Patric Hernández, PharmD, [facility]  Endocrine & Diabetes [facility] Pharmacist  [address] [place] [id number]  Direct Voicemail: [phone]    Telemedicine Visit Details  Type of service:  Video Conference via [facility]  Start Time: 1PM  End Time: 1:20PM  Originating Location (pt. Location): Home  Provider has received verbal consent for a visit from the patient? Yes     Medication Therapy Recommendations  Class 2 obesity due to excess calories without serious comorbidity with body mass index (BMI) of 39.0 to 39.9 in adult    Current Medication: semaglutide (OZEMPIC) 2 MG/1.5ML SOPN pen   Rationale: Cannot afford medication product - Cost - Adherence   Recommendation: Change Medication - Wegovy 0.25 MG/0.5ML Soaj   Status: Accepted " per CPA

## 2023-09-27 NOTE — LETTER
Medication List        Prepared on: 09/27/2023     Bring your Medication List when you go to the doctor, hospital, or   emergency room. And, share it with your family or caregivers.     Note any changes to how you take your medications.  Cross out medications when you no longer use them.    Medication How I take it Why I use it Prescriber   albuterol (PROAIR HFA/PROVENTIL HFA/VENTOLIN HFA) 108 (90 Base) MCG/ACT inhaler Inhale 2 puffs into the lungs every 4 hours as needed for shortness of breath / dyspnea Moderate persistent asthma with acute exacerbation Gen Oconnor MD   calcitRIOL (ROCALTROL) 0.25 MCG capsule Take 1 capsule (0.25 mcg) by mouth daily Hypocalcemia; Postoperative Hypothyroidism Owen Handley MD   calcium 600 MG tablet Take 2 tablets (1,200 mg) by mouth 2 times daily Hypocalcemia; S/P Complete Thyroidectomy Owen Handley MD   Fluticasone Propionate (FLONASE NA) Spray 1 spray in nostril daily   Patient Reported   fluticasone-salmeterol (ADVAIR) 250-50 MCG/ACT inhaler INHALE ONE PUFF BY MOUTH TWICE DAILY Mild persistent asthma without complication Fabiana Ramos MD   ibuprofen (ADVIL/MOTRIN) 200 MG tablet Take 200 mg by mouth every 4 hours as needed for mild pain   Patient Reported   levonorgestrel (MIRENA (52 MG)) 20 MCG/24HR IUD 1 each by Intrauterine route once    Patient Reported   levothyroxine (SYNTHROID/LEVOTHROID) 175 MCG tablet Take 1 tablet (175 mcg) by mouth daily SIX DAYS A WEEK AND TAKE HALF A TABLET (100MCG) ONE DAY A WEEK. Postoperative Hypothyroidism Owen Handley MD   loratadine (CLARITIN) 10 MG tablet Take 10 mg by mouth daily   Patient Reported   montelukast (SINGULAIR) 10 MG tablet Take 1 tablet (10 mg) by mouth daily Mild persistent asthma without complication Fabiana Ramos MD   omeprazole (PRILOSEC) 10 MG DR capsule Take 20 mg by mouth every other day   Patient Reported   Probiotic Product (PROBIOTIC DAILY PO)     Patient Reported   semaglutide  (OZEMPIC) 2 MG/1.5ML SOPN pen Inject 0.5 mg Subcutaneous every 7 days Please keep 8-8-23 clinic appt for refills, lab due Prediabetes; Morbid Obesity (H) Owen Handley MD   Semaglutide, 1 MG/DOSE, (OZEMPIC) 4 MG/3ML pen Inject 1 mg Subcutaneous every 7 days Morbid Obesity (H); Prediabetes Owen Handley MD   Semaglutide-Weight Management (WEGOVY) 1 MG/0.5ML pen Inject 1 mg Subcutaneous once a week Class 2 obesity due to excess calories without serious comorbidity with body mass index (BMI) of 39.0 to 39.9 in adult Owen Handley MD   Vitamin D, Cholecalciferol, 1000 units CAPS     Patient Reported         Add new medications, over-the-counter drugs, herbals, vitamins, or  minerals in the blank rows below.    Medication How I take it Why I use it Prescriber                                      Allergies:      No Known Allergies        Side effects I have had:               Other Information:              My notes and questions:

## 2023-09-27 NOTE — LETTER
September 27, 2023  Kami MENDOZA Rosa Isela  [address] [place] [address]    Dear Ms. Natarajan, AKILAH [facility] DIABETES [facility]     Thank you for talking with me on Sep 27, 2023 about your health and medications. As a follow-up to our conversation, I have included two documents:      Your Recommended To-Do List has steps you should take to get the best results from your medications.  Your Medication List will help you keep track of your medications and how to take them.    If you want to talk about these documents, please call Patric Hernández RPH at phone: [phone], Monday-Friday 8-4:30pm.    I look forward to working with you and your doctors to make sure your medications work well for you.    Sincerely,  Patric Hernández RPH  [facility] Pharmacist, [facility]

## 2023-10-05 ENCOUNTER — PATIENT OUTREACH (OUTPATIENT)
Dept: CARE COORDINATION | Facility: CLINIC | Age: 53
End: 2023-10-05
Payer: COMMERCIAL

## 2023-11-14 DIAGNOSIS — E66.09 CLASS 2 OBESITY DUE TO EXCESS CALORIES WITHOUT SERIOUS COMORBIDITY WITH BODY MASS INDEX (BMI) OF 39.0 TO 39.9 IN ADULT: Chronic | ICD-10-CM

## 2023-11-14 DIAGNOSIS — E66.812 CLASS 2 OBESITY DUE TO EXCESS CALORIES WITHOUT SERIOUS COMORBIDITY WITH BODY MASS INDEX (BMI) OF 39.0 TO 39.9 IN ADULT: Chronic | ICD-10-CM

## 2023-11-20 NOTE — TELEPHONE ENCOUNTER
Wegovy Subcutaneous Solution Auto-injector 1.7 MG/0.75ML   Last Written Prescription Date:  10/17/2023  Last Fill Quantity: 3,   # refills: 0  Last Office Visit : 8/8/2023  Future Office visit:  None  Routing refill request to provider for review/approval because:  Refer to Provider for review and refills for Pt care.     Faviola Thompson RN  Central Triage Red Flags/Med Refills

## 2023-11-21 RX ORDER — SEMAGLUTIDE 1.7 MG/.75ML
1.7 INJECTION, SOLUTION SUBCUTANEOUS WEEKLY
Qty: 3 ML | Refills: 1 | Status: SHIPPED | OUTPATIENT
Start: 2023-11-21 | End: 2023-12-15

## 2023-12-09 ENCOUNTER — HEALTH MAINTENANCE LETTER (OUTPATIENT)
Age: 53
End: 2023-12-09

## 2024-01-14 DIAGNOSIS — J45.30 MILD PERSISTENT ASTHMA WITHOUT COMPLICATION: Chronic | ICD-10-CM

## 2024-01-15 RX ORDER — MONTELUKAST SODIUM 10 MG/1
1 TABLET ORAL DAILY
Qty: 90 TABLET | Refills: 0 | Status: SHIPPED | OUTPATIENT
Start: 2024-01-15 | End: 2024-04-10

## 2024-03-08 ENCOUNTER — TELEPHONE (OUTPATIENT)
Dept: ENDOCRINOLOGY | Facility: CLINIC | Age: 54
End: 2024-03-08
Payer: COMMERCIAL

## 2024-03-08 NOTE — TELEPHONE ENCOUNTER
Pt's secondary insurance is also requiring pa:         [facility]    Fax: [phone]    [place]: [phone]    [place]: [phone]

## 2024-03-20 NOTE — TELEPHONE ENCOUNTER
PA Initiation    Medication: WEGOVY 2.4 MG/0.75ML SC SOAJ  Insurance Company: [employer] - Phone [phone] Fax [phone]  Pharmacy Filling the Rx: [facility] - [place], [place] - [address]  Filling Pharmacy Phone: [phone]  Filling Pharmacy Fax:    Start Date: 3/20/2024

## 2024-03-22 NOTE — TELEPHONE ENCOUNTER
PRIOR AUTHORIZATION DENIED    Medication: WEGOVY 2.4 MG/0.75ML SC SOAJ  Insurance Company: [employer] - Phone [phone] Fax [phone]  Denial Date: 3/22/2024  Denial Reason(s): Insurance states that patient does not meet coverage criteria.   Appeal Information:   Patient Notified: NO

## 2024-03-22 NOTE — TELEPHONE ENCOUNTER
FAXED OVER CLINIC NOTES AND LMN TO INSURANCE TODAY [facility]

## 2024-04-10 DIAGNOSIS — J45.30 MILD PERSISTENT ASTHMA WITHOUT COMPLICATION: Chronic | ICD-10-CM

## 2024-04-10 RX ORDER — MONTELUKAST SODIUM 10 MG/1
TABLET ORAL
Qty: 90 TABLET | Refills: 0 | Status: SHIPPED | OUTPATIENT
Start: 2024-04-10

## 2024-07-03 DIAGNOSIS — J45.30 MILD PERSISTENT ASTHMA WITHOUT COMPLICATION: Chronic | ICD-10-CM

## 2024-07-05 RX ORDER — MONTELUKAST SODIUM 10 MG/1
TABLET ORAL
Qty: 90 TABLET | Refills: 0 | OUTPATIENT
Start: 2024-07-05

## 2024-07-05 NOTE — TELEPHONE ENCOUNTER
This refill request has been declined as the patient is due for an office visit.     Please schedule follow-up appointment. Schedule with preventative visit if due.     Ok to use approval, or same day slots to accommodate patient schedule.     Thank you,    Dr. Cueva

## 2024-07-08 NOTE — TELEPHONE ENCOUNTER
Message read on 7/5/2024  6:00 PM by Kami Natarajan, closing.    Leandra Madrigal CMA ([facility])

## 2024-07-21 DIAGNOSIS — E89.0 POSTOPERATIVE HYPOTHYROIDISM: ICD-10-CM

## 2024-07-21 DIAGNOSIS — E83.51 HYPOCALCEMIA: ICD-10-CM

## 2024-07-25 NOTE — TELEPHONE ENCOUNTER
calcitRIOL (ROCALTROL) 0.25 MCG capsule 90 capsule 3 8/8/2023     Last Office Visit : 8/8/23  Future Office visit:  0    Routing refill request to provider for review/approval because:  Drug not on the [facility], [facility] or [facility] refill protocol

## 2024-07-29 RX ORDER — CALCITRIOL 0.25 UG/1
0.25 CAPSULE, LIQUID FILLED ORAL DAILY
Qty: 90 CAPSULE | Refills: 0 | Status: SHIPPED | OUTPATIENT
Start: 2024-07-29

## 2024-11-23 ENCOUNTER — HEALTH MAINTENANCE LETTER (OUTPATIENT)
Age: 54
End: 2024-11-23

## 2025-01-11 ENCOUNTER — HEALTH MAINTENANCE LETTER (OUTPATIENT)
Age: 55
End: 2025-01-11

## 2025-07-23 ENCOUNTER — PATIENT OUTREACH (OUTPATIENT)
Dept: CARE COORDINATION | Facility: CLINIC | Age: 55
End: 2025-07-23
Payer: COMMERCIAL

## (undated) DEVICE — DRAPE GYN/UROLOGY FLUID POUCH TUR 29455

## (undated) DEVICE — SOL NACL 0.9% INJ 1000ML BAG 07983-09

## (undated) DEVICE — PAD PERI W/WINGS 1580A

## (undated) DEVICE — SOL GLYCINE 1.5% 3000ML BAG 2B7317

## (undated) DEVICE — SUCTION CANISTER DOLPHIN 3000ML

## (undated) DEVICE — SOL WATER IRRIG 1000ML BOTTLE 07139-09

## (undated) DEVICE — DRSG TELFA 3X8" 1238

## (undated) DEVICE — PACK MINOR SBA15MIFSE

## (undated) DEVICE — PREP SKIN SCRUB TRAY 4461A

## (undated) DEVICE — PREP CHLORAPREP 26ML TINTED ORANGE  260815

## (undated) DEVICE — GLOVE PROTEXIS W/NEU-THERA 7.0  2D73TE70

## (undated) DEVICE — CATH INTERMITTENT CLEAN-CATH FEMALE 14FR 6" VINYL LF 420614

## (undated) DEVICE — TUBING SYS AQUILEX BLUE INFLOW AQL-110 YLW OUTFLOW AQL-111

## (undated) DEVICE — GLOVE PROTEXIS BLUE W/NEU-THERA 7.5  2D73EB75

## (undated) DEVICE — PACK SET-UP STD 9102

## (undated) DEVICE — TUBING IRR ENDO HYSTEROSCOPIC 502-200-000A

## (undated) DEVICE — GLOVE PROTEXIS W/NEU-THERA 6.0  2D73TE60

## (undated) DEVICE — SPONGE RAY-TEC 4X8" 7318

## (undated) DEVICE — GLOVE PROTEXIS BLUE W/NEU-THERA 6.0  2D73EB60

## (undated) DEVICE — NDL 19GA 1.5"

## (undated) DEVICE — CATH INTERMITTENT CLEAN-CATH 16FR 16" VINYL LF 421716

## (undated) DEVICE — BASIN SET MINOR DISP

## (undated) RX ORDER — LIDOCAINE HYDROCHLORIDE 20 MG/ML
INJECTION, SOLUTION INFILTRATION; PERINEURAL
Status: DISPENSED
Start: 2020-10-08

## (undated) RX ORDER — SIMETHICONE 40MG/0.6ML
SUSPENSION, DROPS(FINAL DOSAGE FORM)(ML) ORAL
Status: DISPENSED
Start: 2019-07-03

## (undated) RX ORDER — KETOROLAC TROMETHAMINE 30 MG/ML
INJECTION, SOLUTION INTRAMUSCULAR; INTRAVENOUS
Status: DISPENSED
Start: 2020-10-08

## (undated) RX ORDER — BUPIVACAINE HYDROCHLORIDE AND EPINEPHRINE 5; 5 MG/ML; UG/ML
INJECTION, SOLUTION EPIDURAL; INTRACAUDAL; PERINEURAL
Status: DISPENSED
Start: 2020-10-08

## (undated) RX ORDER — ACETAMINOPHEN 325 MG/1
TABLET ORAL
Status: DISPENSED
Start: 2020-10-08

## (undated) RX ORDER — DEXAMETHASONE SODIUM PHOSPHATE 4 MG/ML
INJECTION, SOLUTION INTRA-ARTICULAR; INTRALESIONAL; INTRAMUSCULAR; INTRAVENOUS; SOFT TISSUE
Status: DISPENSED
Start: 2020-10-08

## (undated) RX ORDER — FENTANYL CITRATE 50 UG/ML
INJECTION, SOLUTION INTRAMUSCULAR; INTRAVENOUS
Status: DISPENSED
Start: 2019-07-03

## (undated) RX ORDER — FENTANYL CITRATE 50 UG/ML
INJECTION, SOLUTION INTRAMUSCULAR; INTRAVENOUS
Status: DISPENSED
Start: 2020-10-08

## (undated) RX ORDER — GABAPENTIN 300 MG/1
CAPSULE ORAL
Status: DISPENSED
Start: 2020-10-08

## (undated) RX ORDER — ONDANSETRON 2 MG/ML
INJECTION INTRAMUSCULAR; INTRAVENOUS
Status: DISPENSED
Start: 2020-10-08